# Patient Record
Sex: MALE | Race: WHITE | NOT HISPANIC OR LATINO | Employment: FULL TIME | URBAN - METROPOLITAN AREA
[De-identification: names, ages, dates, MRNs, and addresses within clinical notes are randomized per-mention and may not be internally consistent; named-entity substitution may affect disease eponyms.]

---

## 2017-07-14 ENCOUNTER — HOSPITAL ENCOUNTER (OUTPATIENT)
Dept: URGENT CARE | Facility: CLINIC | Age: 37
Setting detail: SPECIMEN
Discharge: HOME | End: 2017-07-14
Attending: FAMILY MEDICINE | Admitting: FAMILY MEDICINE

## 2017-07-15 LAB
BACTERIA SPEC AEROBE CULT: NORMAL
GRAM STN SPEC: NORMAL
Lab: NORMAL
MICRO REPORT STATUS: NORMAL
SPECIMEN SOURCE: NORMAL

## 2020-07-12 ENCOUNTER — HOSPITAL ENCOUNTER (EMERGENCY)
Facility: HOSPITAL | Age: 40
Discharge: HOME | End: 2020-07-12
Attending: EMERGENCY MEDICINE | Admitting: EMERGENCY MEDICINE

## 2020-07-12 VITALS
OXYGEN SATURATION: 100 % | HEART RATE: 82 BPM | HEIGHT: 70 IN | WEIGHT: 170 LBS | BODY MASS INDEX: 24.34 KG/M2 | DIASTOLIC BLOOD PRESSURE: 98 MMHG | TEMPERATURE: 97.4 F | RESPIRATION RATE: 16 BRPM | SYSTOLIC BLOOD PRESSURE: 139 MMHG

## 2020-07-12 DIAGNOSIS — F43.0 ACUTE STRESS REACTION: Primary | ICD-10-CM

## 2020-07-12 LAB
AMPHET+METHAMPHET UR QL: NEGATIVE
BARBITURATES UR QL SCN: NEGATIVE
BENZODIAZ UR QL SCN: NEGATIVE
CANNABINOIDS SERPL QL: POSITIVE
COCAINE UR QL: NEGATIVE
ETHANOL EXG-MCNC: 0 MG/DL
METHADONE UR QL SCN: NEGATIVE
OPIATES UR QL: NEGATIVE
OXYCODONE UR QL SCN: NEGATIVE
SARS-COV-2 RNA PNL SPEC NAA+PROBE: NOT DETECTED

## 2020-07-12 PROCEDURE — 80307 DRUG TEST PRSMV CHEM ANLYZR: CPT | Performed by: EMERGENCY MEDICINE

## 2020-07-12 PROCEDURE — 99284 EMERGENCY DEPT VISIT MOD MDM: CPT

## 2020-07-12 PROCEDURE — 87635 SARS-COV-2 COVID-19 AMP PRB: CPT | Performed by: EMERGENCY MEDICINE

## 2020-07-12 RX ORDER — NICOTINE 21 MG/24HR
1 PATCH, TRANSDERMAL 24 HOURS TRANSDERMAL
Status: DISCONTINUED | OUTPATIENT
Start: 2020-07-12 | End: 2020-07-12 | Stop reason: HOSPADM

## 2020-07-12 RX ADMIN — NICOTINE 1 PATCH: 21 PATCH TRANSDERMAL at 19:53

## 2020-07-12 NOTE — ED NOTES
I called [facility] to notify them that this patient will need an assessment for HI against his ex-wife.  While I had them on there, I asked what specific labwork they required for admission.  Per Katherine, the patient needs a blood alcohol level prior to assessment.  If he is deemed necessary for IP stay, he must have a negative Covid-19 prior to transfer.  Nurse and provider notified.      Gladys Eason, RegSched Rep  07/12/20 7645

## 2020-07-13 ENCOUNTER — PATIENT OUTREACH (OUTPATIENT)
Dept: CASE MANAGEMENT | Facility: OTHER | Age: 40
End: 2020-07-13

## 2020-07-13 ENCOUNTER — EPISODE CHANGES (OUTPATIENT)
Dept: CASE MANAGEMENT | Facility: OTHER | Age: 40
End: 2020-07-13

## 2020-07-13 NOTE — OUTREACH NOTE
Patient Outreach Note    Pt discharge from [facility] ED on 7/12/20, seen for acute stress reaction, covid 19 testing performed. Pt returned RN-ACM's call. Pt reports he did receive negative covid test results. Pt denies CP, SOA, fever, or cough. Pt denies SI, was cleared by psych in ED for outpatient therapy. Reviewed ED AVS with pt. Education provided. Pt reports he plans to call therapist for follow up, states the MD gave him a name and number to call. Discussed PCP follow up, pt requests RN-ACM schedule PCP appt for him. Pt denies food, medication, or transportation insecurities. [facility] 24 hour nurse line number provided, pt declines covid hotline number. No questions or concerns per pt. RN-ACM will call to schedule pt's PCP appt and call pt back.     Felton Griffith RN  Ambulatory     7/13/2020, 15:29

## 2020-07-13 NOTE — ED PROVIDER NOTES
Subjective   40-year-old male states that he was told to come to the emergency department.  He states he was upset over divorce arrangements with his ex-wife regarding child visitation.  He states that he said he wanted to kill her.  He states he was mad he did not really mean it.  The patient states that he does not have suicidal ideation.  He denies hallucination or delusion.  He states he does not drink alcohol and other than smoking some occasional marijuana has no substance abuse history.  He states that that he has no direct medical complaints          Review of Systems    No past medical history on file.    No Known Allergies    No past surgical history on file.    No family history on file.    Social History     Socioeconomic History   • Marital status: Single     Spouse name: Not on file   • Number of children: Not on file   • Years of education: Not on file   • Highest education level: Not on file           Objective   Physical Exam   Alert nontoxic Darion Coma Scale 15  HEENT showed no head to be normocephalic atraumatic neck was supple  Chest no chest wall tenderness  Abdomen nontender nondistended  Remedies spontaneous range of motion all extremities no track marks ambulatory without difficulty  Procedures           ED Course  ED Course as of Jul 12 2043   Sun Jul 12, 2020 2036 I examined the patient using the appropriate personal protective equipment.      Patient attempted to leave the emergency department for being cleared by psychiatry was escorted back to the room by security      [TH]      ED Course User Index  [TH] Fady Smith MD           Labs Reviewed   URINE DRUG SCREEN - Abnormal; Notable for the following components:       Result Value    THC, Screen, Urine Positive (*)     All other components within normal limits    Narrative:     Negative Thresholds For Drugs Screened:     Amphetamines               500 ng/ml   Barbiturates               200 ng/ml   Benzodiazepines            100  ng/ml   Cocaine                    300 ng/ml   Methadone                  300 ng/ml   Opiates                    300 ng/ml   Oxycodone                  100 ng/ml   THC                        50 ng/ml    The Normal Value for all drugs tested is negative. This report includes final unconfirmed screening results to be used for medical treatment purposes only. Unconfirmed results must not be used for non-medical purposes such as employment or legal testing. Clinical consideration should be applied to any drug of abuse test, particulary when unconfirmed results are used.  All urine drugs of abuse requests without chain of custody are for medical screening purposes only.  False positives are possible.     COVID-19 CARIAS BIO IN-HOUSE, NASAL SWAB NO TRANSPORT MEDIA - Normal    Narrative:     Fact sheet for providers: https://www.fda.gov/media/587076/download     Fact sheet for patients: https://www.fda.gov/media/286414/download   POCT ALCOHOL BREATH TEST - Normal     Medications   nicotine (NICODERM CQ) 21 MG/24HR patch 1 patch (1 patch Transdermal Medication Applied 7/12/20 1953)     No radiology results for the last day                                  MDM  Number of Diagnoses or Management Options     Amount and/or Complexity of Data Reviewed  Clinical lab tests: reviewed    Risk of Complications, Morbidity, and/or Mortality  Presenting problems: high  Diagnostic procedures: high  Management options: high  General comments: The patient remained calm in the emergency department after his attempted flight.  The patient be discharged for intensive outpatient therapy.  The police presented to evaluate the patient and were notified prior to his discharge.  They had reported to the nursing staff that the patient's ex-wife had been advised that he was at the facility for psychiatric evaluation        Final diagnoses:   Acute stress reaction            Fady Smith MD  07/12/20 2043

## 2020-07-13 NOTE — OUTREACH NOTE
Patient Outreach Note    Called pt, PCP appt information given. Pt thanks RN-ACDAYANA for the assistance. Advised pt to call with any needs. Follow up outreach as needed.     Felton Griffith RN  Ambulatory     7/13/2020, 15:47

## 2020-07-13 NOTE — OUTREACH NOTE
Care Coordination Note    Called [facility] PCP office, appt scheduled for Friday 7/24/20 at 1:00 pm. Staff states pt needs to bring insurance card, photo ID, wear a mask and come alone. RN-ACM will notify pt.     Felton Griffith RN  Ambulatory     7/13/2020, 15:32

## 2020-07-13 NOTE — DISCHARGE INSTRUCTIONS
Rest, no work, next 24 hours  Plenty of fluids  COVID-19 test was negative, drug screen positive only for marijuana  Call therapist for follow-up and any additional episodes

## 2020-07-24 ENCOUNTER — OFFICE VISIT (OUTPATIENT)
Dept: FAMILY MEDICINE CLINIC | Facility: CLINIC | Age: 40
End: 2020-07-24

## 2020-07-24 VITALS
DIASTOLIC BLOOD PRESSURE: 93 MMHG | BODY MASS INDEX: 24.25 KG/M2 | TEMPERATURE: 98 F | SYSTOLIC BLOOD PRESSURE: 152 MMHG | OXYGEN SATURATION: 100 % | WEIGHT: 169 LBS | HEART RATE: 75 BPM

## 2020-07-24 DIAGNOSIS — F43.29 STRESS AND ADJUSTMENT REACTION: Primary | ICD-10-CM

## 2020-07-24 DIAGNOSIS — F41.9 ANXIETY: ICD-10-CM

## 2020-07-24 PROCEDURE — 99203 OFFICE O/P NEW LOW 30 MIN: CPT | Performed by: NURSE PRACTITIONER

## 2020-07-24 RX ORDER — ESCITALOPRAM OXALATE 10 MG/1
10 TABLET ORAL DAILY
Qty: 30 TABLET | Refills: 3 | Status: SHIPPED | OUTPATIENT
Start: 2020-07-24 | End: 2020-08-11

## 2020-07-24 RX ORDER — HYDROXYZINE HYDROCHLORIDE 25 MG/1
25 TABLET, FILM COATED ORAL EVERY 6 HOURS PRN
Qty: 30 TABLET | Refills: 1 | Status: SHIPPED | OUTPATIENT
Start: 2020-07-24 | End: 2020-08-11 | Stop reason: SDUPTHER

## 2020-07-24 NOTE — PROGRESS NOTES
Subjective   Chivo Billy is a 40 y.o. male.       HPI   Pt. Is new to our office today.  Reports he hasn't seen PCP in many years.  Medical/social/family hx reviewed.    Pt. Seen in ER a few weeks ago for an acute stress reaction.  He reports he is getting a divorce and was supposed to be getting his 4 year old daughter for visitation.  He made arrangements to be off work for this week long visit and notified his ex-wife months before of the dates.  He reports his ex-wife told him two days before he was to get his daughter that those dates wouldn't work for her.  He became angry and had angry words with her; sent her a text indicating he would harm her.  She contacted the police; he was taken to the ER.  He did calm in the ER and admits he was angry and would never hurt her.  He was released and recommended to [facility] for outpatient counseling.  He did attend one session but states the counseling is set up in a group and he is very uncomfortable with that; would like individual counseling.  He was not started on any medications.  He does feel moods are depressed and he has an overall feeling of anxiety/irritability since the divorce.  He misses his daughter; hasn't seen her in over a month; tearful today in the office.  Denies any SI or HI.  He reports 3+ years ago he was given Wellbutrin for depression but it never felt it helped.    Denies any CP; palpitations; SOA; dizziness; headache; trouble with vision.  He reports a decrease in his appetite over the past two months and reports he is not sleeping well.        The following portions of the patient's history were reviewed and updated as appropriate: allergies, current medications, past family history, past medical history, past social history, past surgical history and problem list.    Review of Systems   Constitutional: Positive for appetite change and fatigue. Negative for activity change, chills, diaphoresis, fever, unexpected weight gain and unexpected  weight loss.   Eyes: Negative for blurred vision, double vision and visual disturbance.   Respiratory: Negative for cough, chest tightness, shortness of breath and wheezing.    Cardiovascular: Negative for chest pain, palpitations and leg swelling.   Gastrointestinal: Negative for abdominal distention, abdominal pain, blood in stool, constipation, diarrhea, nausea, vomiting and indigestion.   Genitourinary: Negative for dysuria, flank pain, frequency, hematuria, nocturia and urgency.   Musculoskeletal: Negative for arthralgias, back pain and myalgias.   Skin: Negative for rash.   Neurological: Negative for dizziness, syncope, weakness, light-headedness, numbness, headache and confusion.   Hematological: Negative for adenopathy.   Psychiatric/Behavioral: Positive for sleep disturbance, depressed mood and stress. Negative for self-injury and suicidal ideas. The patient is nervous/anxious.        Objective   Physical Exam   Constitutional: He is oriented to person, place, and time. He appears well-developed and well-nourished. No distress.   HENT:   Head: Normocephalic and atraumatic.   Right Ear: External ear normal.   Left Ear: External ear normal.   Nose: Nose normal.   Mouth/Throat: Oropharynx is clear and moist.   Eyes: Pupils are equal, round, and reactive to light. Conjunctivae are normal.   Neck: Normal range of motion. Neck supple. No JVD present. No thyromegaly present.   Cardiovascular: Normal rate, regular rhythm, normal heart sounds and intact distal pulses.   No murmur heard.  Pulmonary/Chest: Effort normal and breath sounds normal. No respiratory distress. He has no wheezes. He exhibits no tenderness.   Abdominal: Soft. Bowel sounds are normal. He exhibits no distension and no mass. There is no tenderness. There is no rebound and no guarding.   Musculoskeletal: He exhibits no edema.   Lymphadenopathy:     He has no cervical adenopathy.   Neurological: He is alert and oriented to person, place, and time.    Skin: Skin is warm and dry. Capillary refill takes less than 2 seconds. No rash noted. No erythema.   Psychiatric: His speech is normal. Judgment and thought content normal. His mood appears anxious. His affect is not angry and not inappropriate. He is not agitated and not aggressive. Cognition and memory are normal. He exhibits a depressed mood. He expresses no homicidal and no suicidal ideation. He expresses no suicidal plans and no homicidal plans.   Vitals reviewed.        Assessment/Plan   Chivo was seen today for stress.    Diagnoses and all orders for this visit:    Stress and adjustment reaction  Comments:  Given names and numbers for individual counseling services.    Also given names and numbers for emergency services at [facility] and [facility].     Orders:  -     escitalopram (Lexapro) 10 MG tablet; Take 1 tablet by mouth Daily.  -     hydrOXYzine (ATARAX) 25 MG tablet; Take 1 tablet by mouth Every 6 (Six) Hours As Needed for Anxiety.    Anxiety  Comments:  Started escitalopram 10 mg daily.   Given PRN hydroxyzine for anxiety attack.   Counseling recommended; names/numbers given.   Follow up 4 weeks   Orders:  -     escitalopram (Lexapro) 10 MG tablet; Take 1 tablet by mouth Daily.  -     hydrOXYzine (ATARAX) 25 MG tablet; Take 1 tablet by mouth Every 6 (Six) Hours As Needed for Anxiety.

## 2020-08-10 ENCOUNTER — TELEPHONE (OUTPATIENT)
Dept: FAMILY MEDICINE CLINIC | Facility: CLINIC | Age: 40
End: 2020-08-10

## 2020-08-10 NOTE — TELEPHONE ENCOUNTER
Patient states his father recently passed away and he is wanting to know if there is anything he can do to get more time off work

## 2020-08-11 ENCOUNTER — OFFICE VISIT (OUTPATIENT)
Dept: FAMILY MEDICINE CLINIC | Facility: CLINIC | Age: 40
End: 2020-08-11

## 2020-08-11 VITALS
TEMPERATURE: 97.3 F | HEART RATE: 87 BPM | DIASTOLIC BLOOD PRESSURE: 91 MMHG | HEIGHT: 70 IN | SYSTOLIC BLOOD PRESSURE: 144 MMHG | WEIGHT: 173 LBS | OXYGEN SATURATION: 98 % | BODY MASS INDEX: 24.77 KG/M2

## 2020-08-11 DIAGNOSIS — F41.9 ANXIETY: Primary | ICD-10-CM

## 2020-08-11 DIAGNOSIS — F43.21 GRIEF REACTION: ICD-10-CM

## 2020-08-11 DIAGNOSIS — R06.83 SNORING: ICD-10-CM

## 2020-08-11 DIAGNOSIS — R53.83 FATIGUE, UNSPECIFIED TYPE: ICD-10-CM

## 2020-08-11 PROCEDURE — 99214 OFFICE O/P EST MOD 30 MIN: CPT | Performed by: NURSE PRACTITIONER

## 2020-08-11 RX ORDER — HYDROXYZINE HYDROCHLORIDE 25 MG/1
25 TABLET, FILM COATED ORAL EVERY 6 HOURS PRN
Qty: 30 TABLET | Refills: 1 | Status: SHIPPED | OUTPATIENT
Start: 2020-08-11 | End: 2020-09-11 | Stop reason: SDUPTHER

## 2020-08-11 NOTE — PROGRESS NOTES
"Subjective   Chivo Billy is a 40 y.o. male.       HPI   Pt. Is here today to follow up on anxiety.  He was started on escitalopram 10 mg at the last visit about 3 weeks ago.  He has been taking it daily and has noticed he feels more \"shaky\" since starting this med.  Anxiety has only improved slightly.  On Friday, his father passed away unexpectedly of a massive MI. He is dealing with the grief related to this and trying to plan a ; he still is not on good terms with his ex-wife and still hasn't been able to see his daughter; it has been about 2 months.  He hasn't started counseling but has the names and plans to do this soon.  He is having a difficult time with his father's death and is requesting some time off work to get things sorted out.  His employer only allows him 3 days for bereavement. He denies any SI or HI.      With his father passing of an MI, he learned that his father also had sleep apnea.  Pt. Is now concerned he may also have this.  He has been told by his children that he snores loudly during sleep.  He says he has never felt like he gets good sleep; he wakes frequently.  He also reports feeling fatigued upon waking in the morning.  He denies any CP; palpitations; SOA; dizziness; headache; trouble with vision.        The following portions of the patient's history were reviewed and updated as appropriate: allergies, current medications, past family history, past medical history, past social history, past surgical history and problem list.    Review of Systems   Constitutional: Positive for fatigue. Negative for activity change, appetite change, chills, diaphoresis, fever, unexpected weight gain and unexpected weight loss.   HENT: Negative for sore throat, swollen glands, trouble swallowing and voice change.    Eyes: Negative for blurred vision, double vision and visual disturbance.   Respiratory: Negative for cough, chest tightness, shortness of breath and wheezing.    Cardiovascular: " Negative for chest pain, palpitations and leg swelling.   Gastrointestinal: Negative for abdominal distention, abdominal pain, blood in stool, constipation, diarrhea, nausea, vomiting and indigestion.   Genitourinary: Negative for dysuria, flank pain, frequency, hematuria, nocturia and urgency.   Musculoskeletal: Negative for arthralgias, back pain and myalgias.   Skin: Negative for rash.   Neurological: Negative for dizziness, weakness, light-headedness, headache and confusion.   Psychiatric/Behavioral: Positive for sleep disturbance (snoring), depressed mood and stress. Negative for agitation, self-injury and suicidal ideas. The patient is nervous/anxious.        Objective   Physical Exam   Constitutional: He is oriented to person, place, and time. He appears well-developed and well-nourished. No distress.   HENT:   Head: Normocephalic and atraumatic.   Right Ear: External ear normal.   Left Ear: External ear normal.   Nose: Nose normal.   Mouth/Throat: Oropharynx is clear and moist.   Eyes: Pupils are equal, round, and reactive to light. Conjunctivae are normal.   Neck: Normal range of motion. Neck supple. No JVD present. No thyromegaly present.   Cardiovascular: Normal rate, regular rhythm, normal heart sounds and intact distal pulses.   No murmur heard.  Pulmonary/Chest: Effort normal and breath sounds normal. No respiratory distress. He has no wheezes. He exhibits no tenderness.   Abdominal: Soft. Bowel sounds are normal. He exhibits no distension. There is no tenderness.   Musculoskeletal: He exhibits no edema.   Lymphadenopathy:     He has no cervical adenopathy.   Neurological: He is alert and oriented to person, place, and time.   Skin: Skin is warm and dry. Capillary refill takes less than 2 seconds. No erythema.   Psychiatric: His speech is normal and behavior is normal. Judgment normal. His mood appears anxious. Cognition and memory are normal. He exhibits a depressed mood. He expresses no homicidal and  no suicidal ideation. He expresses no suicidal plans and no homicidal plans.   Tearful today   Vitals reviewed.        Assessment/Plan   Chivo was seen today for anxiety.    Diagnoses and all orders for this visit:    Anxiety  Comments:  Will switch from escitalopram to sertraline 50 mg daily.    Refill given for hydroxyzine PRN.   Counseling recommended.   Follow up 4 weeks.     Orders:  -     sertraline (Zoloft) 50 MG tablet; Take 1 tablet by mouth Daily.  -     hydrOXYzine (ATARAX) 25 MG tablet; Take 1 tablet by mouth Every 6 (Six) Hours As Needed for Anxiety.    Grief reaction  Comments:  Given names and numbers for individual counseling services.    Also given names and numbers for emergency services at [facility] and [facility].     Orders:  -     sertraline (Zoloft) 50 MG tablet; Take 1 tablet by mouth Daily.  -     hydrOXYzine (ATARAX) 25 MG tablet; Take 1 tablet by mouth Every 6 (Six) Hours As Needed for Anxiety.    Snoring  Comments:  Referral to Sleep Medicine for eval.   Orders:  -     Ambulatory Referral to Sleep Medicine    Fatigue, unspecified type  Comments:  Referral to Sleep Medicine for eval.   Orders:  -     Ambulatory Referral to Sleep Medicine

## 2020-08-21 ENCOUNTER — TELEPHONE (OUTPATIENT)
Dept: FAMILY MEDICINE CLINIC | Facility: CLINIC | Age: 40
End: 2020-08-21

## 2020-08-21 NOTE — TELEPHONE ENCOUNTER
Patient wants to know if you would be able to extend his FMLA? He says he is not ready to go back Monday.

## 2020-09-11 ENCOUNTER — OFFICE VISIT (OUTPATIENT)
Dept: FAMILY MEDICINE CLINIC | Facility: CLINIC | Age: 40
End: 2020-09-11

## 2020-09-11 ENCOUNTER — LAB (OUTPATIENT)
Dept: FAMILY MEDICINE CLINIC | Facility: CLINIC | Age: 40
End: 2020-09-11

## 2020-09-11 VITALS
HEIGHT: 70 IN | SYSTOLIC BLOOD PRESSURE: 139 MMHG | WEIGHT: 172 LBS | TEMPERATURE: 97.1 F | HEART RATE: 77 BPM | BODY MASS INDEX: 24.62 KG/M2 | OXYGEN SATURATION: 100 % | DIASTOLIC BLOOD PRESSURE: 94 MMHG

## 2020-09-11 DIAGNOSIS — I10 ESSENTIAL HYPERTENSION: ICD-10-CM

## 2020-09-11 DIAGNOSIS — F41.9 ANXIETY: Primary | ICD-10-CM

## 2020-09-11 LAB
ALBUMIN SERPL-MCNC: 3.8 G/DL (ref 3.5–5.2)
ALBUMIN/GLOB SERPL: 1.5 G/DL
ALP SERPL-CCNC: 67 U/L (ref 39–117)
ALT SERPL W P-5'-P-CCNC: 13 U/L (ref 1–41)
ANION GAP SERPL CALCULATED.3IONS-SCNC: 7 MMOL/L (ref 5–15)
AST SERPL-CCNC: 11 U/L (ref 1–40)
BILIRUB SERPL-MCNC: 0.5 MG/DL (ref 0–1.2)
BUN SERPL-MCNC: 11 MG/DL (ref 6–20)
BUN/CREAT SERPL: 12.8 (ref 7–25)
CALCIUM SPEC-SCNC: 9.2 MG/DL (ref 8.6–10.5)
CHLORIDE SERPL-SCNC: 109 MMOL/L (ref 98–107)
CHOLEST SERPL-MCNC: 182 MG/DL (ref 0–200)
CO2 SERPL-SCNC: 24 MMOL/L (ref 22–29)
CREAT SERPL-MCNC: 0.86 MG/DL (ref 0.76–1.27)
GFR SERPL CREATININE-BSD FRML MDRD: 100 ML/MIN/1.73
GLOBULIN UR ELPH-MCNC: 2.5 GM/DL
GLUCOSE SERPL-MCNC: 83 MG/DL (ref 65–99)
HDLC SERPL-MCNC: 36 MG/DL (ref 40–60)
LDLC SERPL CALC-MCNC: 128 MG/DL (ref 0–100)
LDLC/HDLC SERPL: 3.55 {RATIO}
POTASSIUM SERPL-SCNC: 4.1 MMOL/L (ref 3.5–5.2)
PROT SERPL-MCNC: 6.3 G/DL (ref 6–8.5)
SODIUM SERPL-SCNC: 140 MMOL/L (ref 136–145)
TRIGL SERPL-MCNC: 91 MG/DL (ref 0–150)
VLDLC SERPL-MCNC: 18.2 MG/DL (ref 5–40)

## 2020-09-11 PROCEDURE — 36415 COLL VENOUS BLD VENIPUNCTURE: CPT

## 2020-09-11 PROCEDURE — 80061 LIPID PANEL: CPT | Performed by: NURSE PRACTITIONER

## 2020-09-11 PROCEDURE — 80053 COMPREHEN METABOLIC PANEL: CPT | Performed by: NURSE PRACTITIONER

## 2020-09-11 PROCEDURE — 99214 OFFICE O/P EST MOD 30 MIN: CPT | Performed by: NURSE PRACTITIONER

## 2020-09-11 RX ORDER — HYDROXYZINE HYDROCHLORIDE 25 MG/1
25 TABLET, FILM COATED ORAL EVERY 6 HOURS PRN
Qty: 30 TABLET | Refills: 1 | Status: SHIPPED | OUTPATIENT
Start: 2020-09-11 | End: 2021-02-05 | Stop reason: SDUPTHER

## 2020-09-11 RX ORDER — LISINOPRIL 10 MG/1
10 TABLET ORAL DAILY
Qty: 30 TABLET | Refills: 2 | Status: SHIPPED | OUTPATIENT
Start: 2020-09-11 | End: 2021-04-16

## 2020-09-11 NOTE — PATIENT INSTRUCTIONS
Managing Anxiety, Adult  After being diagnosed with an anxiety disorder, you may be relieved to know why you have felt or behaved a certain way. You may also feel overwhelmed about the treatment ahead and what it will mean for your life. With care and support, you can manage this condition and recover from it.  How to manage lifestyle changes  Managing stress and anxiety    Stress is your body's reaction to life changes and events, both good and bad. Most stress will last just a few hours, but stress can be ongoing and can lead to more than just stress. Although stress can play a major role in anxiety, it is not the same as anxiety. Stress is usually caused by something external, such as a deadline, test, or competition. Stress normally passes after the triggering event has ended.   Anxiety is caused by something internal, such as imagining a terrible outcome or worrying that something will go wrong that will devastate you. Anxiety often does not go away even after the triggering event is over, and it can become long-term (chronic) worry. It is important to understand the differences between stress and anxiety and to manage your stress effectively so that it does not lead to an anxious response.  Talk with your health care provider or a counselor to learn more about reducing anxiety and stress. He or she may suggest tension reduction techniques, such as:  · Music therapy. This can include creating or listening to music that you enjoy and that inspires you.  · Mindfulness-based meditation. This involves being aware of your normal breaths while not trying to control your breathing. It can be done while sitting or walking.  · Centering prayer. This involves focusing on a word, phrase, or sacred image that means something to you and brings you peace.  · Deep breathing. To do this, expand your stomach and inhale slowly through your nose. Hold your breath for 3-5 seconds. Then exhale slowly, letting your stomach muscles  relax.  · Self-talk. This involves identifying thought patterns that lead to anxiety reactions and changing those patterns.  · Muscle relaxation. This involves tensing muscles and then relaxing them.  Choose a tension reduction technique that suits your lifestyle and personality. These techniques take time and practice. Set aside 5-15 minutes a day to do them. Therapists can offer counseling and training in these techniques. The training to help with anxiety may be covered by some insurance plans. Other things you can do to manage stress and anxiety include:  · Keeping a stress/anxiety diary. This can help you learn what triggers your reaction and then learn ways to manage your response.  · Thinking about how you react to certain situations. You may not be able to control everything, but you can control your response.  · Making time for activities that help you relax and not feeling guilty about spending your time in this way.  · Visual imagery and yoga can help you stay calm and relax.    Medicines  Medicines can help ease symptoms. Medicines for anxiety include:  · Anti-anxiety drugs.  · Antidepressants.  Medicines are often used as a primary treatment for anxiety disorder. Medicines will be prescribed by a health care provider. When used together, medicines, psychotherapy, and tension reduction techniques may be the most effective treatment.  Relationships  Relationships can play a big part in helping you recover. Try to spend more time connecting with trusted friends and family members. Consider going to couples counseling, taking family education classes, or going to family therapy. Therapy can help you and others better understand your condition.  How to recognize changes in your anxiety  Everyone responds differently to treatment for anxiety. Recovery from anxiety happens when symptoms decrease and stop interfering with your daily activities at home or work. This may mean that you will start to:  · Have  better concentration and focus. Worry will interfere less in your daily thinking.  · Sleep better.  · Be less irritable.  · Have more energy.  · Have improved memory.  It is important to recognize when your condition is getting worse. Contact your health care provider if your symptoms interfere with home or work and you feel like your condition is not improving.  Follow these instructions at home:  Activity  · Exercise. Most adults should do the following:  ? Exercise for at least 150 minutes each week. The exercise should increase your heart rate and make you sweat (moderate-intensity exercise).  ? Strengthening exercises at least twice a week.  · Get the right amount and quality of sleep. Most adults need 7-9 hours of sleep each night.  Lifestyle    · Eat a healthy diet that includes plenty of vegetables, fruits, whole grains, low-fat dairy products, and lean protein. Do not eat a lot of foods that are high in solid fats, added sugars, or salt.  · Make choices that simplify your life.  · Do not use any products that contain nicotine or tobacco, such as cigarettes, e-cigarettes, and chewing tobacco. If you need help quitting, ask your health care provider.  · Avoid caffeine, alcohol, and certain over-the-counter cold medicines. These may make you feel worse. Ask your pharmacist which medicines to avoid.  General instructions  · Take over-the-counter and prescription medicines only as told by your health care provider.  · Keep all follow-up visits as told by your health care provider. This is important.  Where to find support  You can get help and support from these sources:  · Self-help groups.  · Online and community organizations.  · A trusted spiritual leader.  · Couples counseling.  · Family education classes.  · Family therapy.  Where to find more information  You may find that joining a support group helps you deal with your anxiety. The following sources can help you locate counselors or support groups near  "you:  · Mental Health [place]: www.mentalhealth[place].net  · Anxiety and Depression Association of [place] (ADAA): www.adaa.org  · National [place] on Mental Illness ([place]): www.[place]om[place].org  Contact a health care provider if you:  · Have a hard time staying focused or finishing daily tasks.  · Spend many hours a day feeling worried about everyday life.  · Become exhausted by worry.  · Start to have headaches, feel tense, or have nausea.  · Urinate more than normal.  · Have diarrhea.  Get help right away if you have:  · A racing heart and shortness of breath.  · Thoughts of hurting yourself or others.  If you ever feel like you may hurt yourself or others, or have thoughts about taking your own life, get help right away. You can go to your nearest emergency department or call:  · Your local emergency services (911 in the [place]  · A suicide crisis helpline, such as the National Suicide Prevention Lifeline at 1-828.916.1027. This is open 24 hours a day.  Summary  · Taking steps to learn and use tension reduction techniques can help calm you and help prevent triggering an anxiety reaction.  · When used together, medicines, psychotherapy, and tension reduction techniques may be the most effective treatment.  · Family, friends, and partners can play a big part in helping you recover from an anxiety disorder.  This information is not intended to replace advice given to you by your health care provider. Make sure you discuss any questions you have with your health care provider.  Document Released: 12/12/2017 Document Revised: 05/19/2020 Document Reviewed: 05/19/2020  Elsevier Patient Education © 2020 [employer]  DASH Eating Plan  DASH stands for \"Dietary Approaches to Stop Hypertension.\" The DASH eating plan is a healthy eating plan that has been shown to reduce high blood pressure (hypertension). It may also reduce your risk for type 2 diabetes, heart disease, and stroke. The DASH eating plan may also help with weight " "loss.  What are tips for following this plan?    General guidelines  · Avoid eating more than 2,300 mg (milligrams) of salt (sodium) a day. If you have hypertension, you may need to reduce your sodium intake to 1,500 mg a day.  · Limit alcohol intake to no more than 1 drink a day for nonpregnant women and 2 drinks a day for men. One drink equals 12 oz of beer, 5 oz of wine, or 1½ oz of hard liquor.  · Work with your health care provider to maintain a healthy body weight or to lose weight. Ask what an ideal weight is for you.  · Get at least 30 minutes of exercise that causes your heart to beat faster (aerobic exercise) most days of the week. Activities may include walking, swimming, or biking.  · Work with your health care provider or diet and nutrition specialist (dietitian) to adjust your eating plan to your individual calorie needs.  Reading food labels    · Check food labels for the amount of sodium per serving. Choose foods with less than 5 percent of the Daily Value of sodium. Generally, foods with less than 300 mg of sodium per serving fit into this eating plan.  · To find whole grains, look for the word \"whole\" as the first word in the ingredient list.  Shopping  · Buy products labeled as \"low-sodium\" or \"no salt added.\"  · Buy fresh foods. Avoid canned foods and premade or frozen meals.  Cooking  · Avoid adding salt when cooking. Use salt-free seasonings or herbs instead of table salt or sea salt. Check with your health care provider or pharmacist before using salt substitutes.  · Do not pyle foods. Cook foods using healthy methods such as baking, boiling, grilling, and broiling instead.  · Cook with heart-healthy oils, such as olive, canola, soybean, or sunflower oil.  Meal planning  · Eat a balanced diet that includes:  ? 5 or more servings of fruits and vegetables each day. At each meal, try to fill half of your plate with fruits and vegetables.  ? Up to 6-8 servings of whole grains each day.  ? Less than " 6 oz of lean meat, poultry, or fish each day. A 3-oz serving of meat is about the same size as a deck of cards. One egg equals 1 oz.  ? 2 servings of low-fat dairy each day.  ? A serving of nuts, seeds, or beans 5 times each week.  ? Heart-healthy fats. Healthy fats called Omega-3 fatty acids are found in foods such as flaxseeds and coldwater fish, like sardines, salmon, and mackerel.  · Limit how much you eat of the following:  ? Canned or prepackaged foods.  ? Food that is high in trans fat, such as fried foods.  ? Food that is high in saturated fat, such as fatty meat.  ? Sweets, desserts, sugary drinks, and other foods with added sugar.  ? Full-fat dairy products.  · Do not salt foods before eating.  · Try to eat at least 2 vegetarian meals each week.  · Eat more home-cooked food and less restaurant, buffet, and fast food.  · When eating at a restaurant, ask that your food be prepared with less salt or no salt, if possible.  What foods are recommended?  The items listed may not be a complete list. Talk with your dietitian about what dietary choices are best for you.  Grains  Whole-grain or whole-wheat bread. Whole-grain or whole-wheat pasta. Brown rice. Oatmeal. Quinoa. Bulgur. Whole-grain and low-sodium cereals. Seda bread. Low-fat, low-sodium crackers. Whole-wheat flour tortillas.  Vegetables  Fresh or frozen vegetables (raw, steamed, roasted, or grilled). Low-sodium or reduced-sodium tomato and vegetable juice. Low-sodium or reduced-sodium tomato sauce and tomato paste. Low-sodium or reduced-sodium canned vegetables.  Fruits  All fresh, dried, or frozen fruit. Canned fruit in natural juice (without added sugar).  Meat and other protein foods  Skinless chicken or turkey. Ground chicken or turkey. Pork with fat trimmed off. Fish and seafood. Egg whites. Dried beans, peas, or lentils. Unsalted nuts, nut butters, and seeds. Unsalted canned beans. Lean cuts of beef with fat trimmed off. Low-sodium, lean deli  meat.  Dairy  Low-fat (1%) or fat-free (skim) milk. Fat-free, low-fat, or reduced-fat cheeses. Nonfat, low-sodium ricotta or cottage cheese. Low-fat or nonfat yogurt. Low-fat, low-sodium cheese.  Fats and oils  Soft margarine without trans fats. Vegetable oil. Low-fat, reduced-fat, or light mayonnaise and salad dressings (reduced-sodium). Canola, safflower, olive, soybean, and sunflower oils. Avocado.  Seasoning and other foods  Herbs. Spices. Seasoning mixes without salt. Unsalted popcorn and pretzels. Fat-free sweets.  What foods are not recommended?  The items listed may not be a complete list. Talk with your dietitian about what dietary choices are best for you.  Grains  Baked goods made with fat, such as croissants, muffins, or some breads. Dry pasta or rice meal packs.  Vegetables  Creamed or fried vegetables. Vegetables in a cheese sauce. Regular canned vegetables (not low-sodium or reduced-sodium). Regular canned tomato sauce and paste (not low-sodium or reduced-sodium). Regular tomato and vegetable juice (not low-sodium or reduced-sodium). Pickles. Olives.  Fruits  Canned fruit in a light or heavy syrup. Fried fruit. Fruit in cream or butter sauce.  Meat and other protein foods  Fatty cuts of meat. Ribs. Fried meat. Wang. Sausage. Bologna and other processed lunch meats. Salami. Fatback. Hotdogs. Bratwurst. Salted nuts and seeds. Canned beans with added salt. Canned or smoked fish. Whole eggs or egg yolks. Chicken or turkey with skin.  Dairy  Whole or 2% milk, cream, and half-and-half. Whole or full-fat cream cheese. Whole-fat or sweetened yogurt. Full-fat cheese. Nondairy creamers. Whipped toppings. Processed cheese and cheese spreads.  Fats and oils  Butter. Stick margarine. Lard. Shortening. Ghee. Wang fat. Tropical oils, such as coconut, palm kernel, or palm oil.  Seasoning and other foods  Salted popcorn and pretzels. Onion salt, garlic salt, seasoned salt, table salt, and sea salt. [facility]  sauce. Tartar sauce. Barbecue sauce. Teriyaki sauce. Soy sauce, including reduced-sodium. Steak sauce. Canned and packaged gravies. Fish sauce. Oyster sauce. Cocktail sauce. Horseradish that you find on the shelf. Ketchup. Mustard. Meat flavorings and tenderizers. Bouillon cubes. Hot sauce and Tabasco sauce. Premade or packaged marinades. Premade or packaged taco seasonings. Relishes. Regular salad dressings.  Where to find more information:  · National Heart, Lung, and Blood [place]: www.nhlbi.nih.gov  · American Heart Association: www.heart.org  Summary  · The DASH eating plan is a healthy eating plan that has been shown to reduce high blood pressure (hypertension). It may also reduce your risk for type 2 diabetes, heart disease, and stroke.  · With the DASH eating plan, you should limit salt (sodium) intake to 2,300 mg a day. If you have hypertension, you may need to reduce your sodium intake to 1,500 mg a day.  · When on the DASH eating plan, aim to eat more fresh fruits and vegetables, whole grains, lean proteins, low-fat dairy, and heart-healthy fats.  · Work with your health care provider or diet and nutrition specialist (dietitian) to adjust your eating plan to your individual calorie needs.  This information is not intended to replace advice given to you by your health care provider. Make sure you discuss any questions you have with your health care provider.  Document Released: 12/06/2012 Document Revised: 11/30/2018 Document Reviewed: 12/11/2017  [employer] Video Patient Education © 2020 [employer]  Hypertension, Adult  High blood pressure (hypertension) is when the force of blood pumping through the arteries is too strong. The arteries are the blood vessels that carry blood from the heart throughout the body. Hypertension forces the heart to work harder to pump blood and may cause arteries to become narrow or stiff. Untreated or uncontrolled hypertension can cause a heart attack, heart failure, a stroke,  "kidney disease, and other problems.  A blood pressure reading consists of a higher number over a lower number. Ideally, your blood pressure should be below 120/80. The first (\"top\") number is called the systolic pressure. It is a measure of the pressure in your arteries as your heart beats. The second (\"bottom\") number is called the diastolic pressure. It is a measure of the pressure in your arteries as the heart relaxes.  What are the causes?  The exact cause of this condition is not known. There are some conditions that result in or are related to high blood pressure.  What increases the risk?  Some risk factors for high blood pressure are under your control. The following factors may make you more likely to develop this condition:  · Smoking.  · Having type 2 diabetes mellitus, high cholesterol, or both.  · Not getting enough exercise or physical activity.  · Being overweight.  · Having too much fat, sugar, calories, or salt (sodium) in your diet.  · Drinking too much alcohol.  Some risk factors for high blood pressure may be difficult or impossible to change. Some of these factors include:  · Having chronic kidney disease.  · Having a family history of high blood pressure.  · Age. Risk increases with age.  · Race. You may be at higher risk if you are .  · Gender. Men are at higher risk than women before age 45. After age 65, women are at higher risk than men.  · Having obstructive sleep apnea.  · Stress.  What are the signs or symptoms?  High blood pressure may not cause symptoms. Very high blood pressure (hypertensive crisis) may cause:  · Headache.  · Anxiety.  · Shortness of breath.  · Nosebleed.  · Nausea and vomiting.  · Vision changes.  · Severe chest pain.  · Seizures.  How is this diagnosed?  This condition is diagnosed by measuring your blood pressure while you are seated, with your arm resting on a flat surface, your legs uncrossed, and your feet flat on the floor. The cuff of the blood " pressure monitor will be placed directly against the skin of your upper arm at the level of your heart. It should be measured at least twice using the same arm. Certain conditions can cause a difference in blood pressure between your right and left arms.  Certain factors can cause blood pressure readings to be lower or higher than normal for a short period of time:  · When your blood pressure is higher when you are in a health care provider's office than when you are at home, this is called white coat hypertension. Most people with this condition do not need medicines.  · When your blood pressure is higher at home than when you are in a health care provider's office, this is called masked hypertension. Most people with this condition may need medicines to control blood pressure.  If you have a high blood pressure reading during one visit or you have normal blood pressure with other risk factors, you may be asked to:  · Return on a different day to have your blood pressure checked again.  · Monitor your blood pressure at home for 1 week or longer.  If you are diagnosed with hypertension, you may have other blood or imaging tests to help your health care provider understand your overall risk for other conditions.  How is this treated?  This condition is treated by making healthy lifestyle changes, such as eating healthy foods, exercising more, and reducing your alcohol intake. Your health care provider may prescribe medicine if lifestyle changes are not enough to get your blood pressure under control, and if:  · Your systolic blood pressure is above 130.  · Your diastolic blood pressure is above 80.  Your personal target blood pressure may vary depending on your medical conditions, your age, and other factors.  Follow these instructions at home:  Eating and drinking    · Eat a diet that is high in fiber and potassium, and low in sodium, added sugar, and fat. An example eating plan is called the DASH (Dietary  Approaches to Stop Hypertension) diet. To eat this way:  ? Eat plenty of fresh fruits and vegetables. Try to fill one half of your plate at each meal with fruits and vegetables.  ? Eat whole grains, such as whole-wheat pasta, brown rice, or whole-grain bread. Fill about one fourth of your plate with whole grains.  ? Eat or drink low-fat dairy products, such as skim milk or low-fat yogurt.  ? Avoid fatty cuts of meat, processed or cured meats, and poultry with skin. Fill about one fourth of your plate with lean proteins, such as fish, chicken without skin, beans, eggs, or tofu.  ? Avoid pre-made and processed foods. These tend to be higher in sodium, added sugar, and fat.  · Reduce your daily sodium intake. Most people with hypertension should eat less than 1,500 mg of sodium a day.  · Do not drink alcohol if:  ? Your health care provider tells you not to drink.  ? You are pregnant, may be pregnant, or are planning to become pregnant.  · If you drink alcohol:  ? Limit how much you use to:  § 0-1 drink a day for women.  § 0-2 drinks a day for men.  ? Be aware of how much alcohol is in your drink. In the [place] one drink equals one 12 oz bottle of beer (355 mL), one 5 oz glass of wine (148 mL), or one 1½ oz glass of hard liquor (44 mL).  Lifestyle    · Work with your health care provider to maintain a healthy body weight or to lose weight. Ask what an ideal weight is for you.  · Get at least 30 minutes of exercise most days of the week. Activities may include walking, swimming, or biking.  · Include exercise to strengthen your muscles (resistance exercise), such as Pilates or lifting weights, as part of your weekly exercise routine. Try to do these types of exercises for 30 minutes at least 3 days a week.  · Do not use any products that contain nicotine or tobacco, such as cigarettes, e-cigarettes, and chewing tobacco. If you need help quitting, ask your health care provider.  · Monitor your blood pressure at home as  told by your health care provider.  · Keep all follow-up visits as told by your health care provider. This is important.  Medicines  · Take over-the-counter and prescription medicines only as told by your health care provider. Follow directions carefully. Blood pressure medicines must be taken as prescribed.  · Do not skip doses of blood pressure medicine. Doing this puts you at risk for problems and can make the medicine less effective.  · Ask your health care provider about side effects or reactions to medicines that you should watch for.  Contact a health care provider if you:  · Think you are having a reaction to a medicine you are taking.  · Have headaches that keep coming back (recurring).  · Feel dizzy.  · Have swelling in your ankles.  · Have trouble with your vision.  Get help right away if you:  · Develop a severe headache or confusion.  · Have unusual weakness or numbness.  · Feel faint.  · Have severe pain in your chest or abdomen.  · Vomit repeatedly.  · Have trouble breathing.  Summary  · Hypertension is when the force of blood pumping through your arteries is too strong. If this condition is not controlled, it may put you at risk for serious complications.  · Your personal target blood pressure may vary depending on your medical conditions, your age, and other factors. For most people, a normal blood pressure is less than 120/80.  · Hypertension is treated with lifestyle changes, medicines, or a combination of both. Lifestyle changes include losing weight, eating a healthy, low-sodium diet, exercising more, and limiting alcohol.  This information is not intended to replace advice given to you by your health care provider. Make sure you discuss any questions you have with your health care provider.  Document Released: 12/18/2006 Document Revised: 08/28/2019 Document Reviewed: 08/28/2019  Elsevier Patient Education © 2020 [employer]

## 2020-09-11 NOTE — PROGRESS NOTES
"Subjective   Chivo Billy is a 40 y.o. male.       HPI   Here to follow up on anxiety.  Currently on sertraline 50 mg daily.  He says this medication is making him feel more anxious.  He did not tolerate escitalopram the month prior due to the same concern. He is taking the hydroxyzine twice daily for anxiety.  He feels \"shaky\" and \"very anxious in social settings\".  Moods are up and down due to custody dispute for his daughter.  He still has not been able to see his daughter for several months now.    Smokes 3 ppd cigarettes and THC.  In prison on Wednesday for a warrant; he turned himself in for.  At his father's recent  he learned his grandmother had bipolar disorder and his aunt also has anxiety.     BP remains elevated. He denies any CP; palpitations; SOA: dizziness; headache; trouble with vision.  He is also drinking a pot of coffee and monster energy drinks daily.      The following portions of the patient's history were reviewed and updated as appropriate: allergies, current medications, past family history, past medical history, past social history, past surgical history and problem list.    Review of Systems   Constitutional: Negative for activity change, appetite change, chills, diaphoresis, fatigue, fever, unexpected weight gain and unexpected weight loss.   Eyes: Negative for photophobia and visual disturbance.   Respiratory: Negative for cough, chest tightness, shortness of breath and wheezing.    Cardiovascular: Negative for chest pain, palpitations and leg swelling.   Gastrointestinal: Negative for abdominal distention, abdominal pain, blood in stool, constipation, diarrhea, nausea, vomiting and indigestion.   Genitourinary: Negative for dysuria, flank pain, frequency, hematuria, nocturia and urgency.   Musculoskeletal: Negative for arthralgias, back pain and myalgias.   Skin: Negative for rash.   Neurological: Negative for dizziness, weakness, light-headedness, numbness, headache and " confusion.   Psychiatric/Behavioral: Positive for agitation, sleep disturbance, depressed mood and stress. Negative for hallucinations, self-injury and suicidal ideas. The patient is nervous/anxious.        Objective   Physical Exam   Constitutional: He is oriented to person, place, and time. He appears well-developed and well-nourished. No distress.   HENT:   Head: Normocephalic and atraumatic.   Eyes: Pupils are equal, round, and reactive to light. Conjunctivae are normal.   Neck: Normal range of motion. Neck supple. No JVD present. No thyromegaly present.   Cardiovascular: Normal rate, regular rhythm, normal heart sounds and intact distal pulses.   No murmur heard.  Pulmonary/Chest: Effort normal and breath sounds normal. No respiratory distress. He has no wheezes. He exhibits no tenderness.   Abdominal: Soft. Bowel sounds are normal. He exhibits no distension. There is no tenderness.   Musculoskeletal: He exhibits no edema.   Lymphadenopathy:     He has no cervical adenopathy.   Neurological: He is alert and oriented to person, place, and time.   Skin: Skin is warm and dry. Capillary refill takes less than 2 seconds. No erythema.   Psychiatric: His speech is normal and behavior is normal. Judgment and thought content normal. His mood appears anxious. Cognition and memory are normal. He exhibits a depressed mood. He expresses no homicidal and no suicidal ideation. He expresses no suicidal plans and no homicidal plans.   Vitals reviewed.        Assessment/Plan   Chivo was seen today for anxiety.    Diagnoses and all orders for this visit:    Anxiety  Comments:  Pt. has stopped sertraline.   Given samples of Vraylar and instrcuted on use.   Phone follow up in 2 weeks; office f/u 4 wk  Referral to Psych  Orders:  -     Cariprazine HCl (Vraylar) 3 MG capsule capsule; Take 1 capsule by mouth Daily.  -     Ambulatory Referral to Psychiatry  -     hydrOXYzine (ATARAX) 25 MG tablet; Take 1 tablet by mouth Every 6 (Six)  Hours As Needed for Anxiety.    Essential hypertension  Comments:  Given lisinopril 10 mg daily.   Labs today.   Advised to cut back on caffeine; stop smoking.   Monitor BP at home.   Orders:  -     lisinopril (PRINIVIL,ZESTRIL) 10 MG tablet; Take 1 tablet by mouth Daily.  -     Comprehensive metabolic panel; Future  -     Lipid panel; Future

## 2020-09-14 ENCOUNTER — TELEPHONE (OUTPATIENT)
Dept: FAMILY MEDICINE CLINIC | Facility: CLINIC | Age: 40
End: 2020-09-14

## 2020-09-14 DIAGNOSIS — F31.9 BIPOLAR 1 DISORDER (HCC): Primary | ICD-10-CM

## 2020-09-14 RX ORDER — OLANZAPINE 10 MG/1
10 TABLET ORAL NIGHTLY
Qty: 30 TABLET | Refills: 1 | Status: SHIPPED | OUTPATIENT
Start: 2020-09-14 | End: 2020-10-02

## 2020-09-14 NOTE — TELEPHONE ENCOUNTER
Insurance does not cover Vraylar. Preferred alternative is risperidone, quetiapine, or olanzipine.

## 2020-10-02 ENCOUNTER — TELEPHONE (OUTPATIENT)
Dept: FAMILY MEDICINE CLINIC | Facility: CLINIC | Age: 40
End: 2020-10-02

## 2020-10-02 DIAGNOSIS — F41.9 ANXIETY: Primary | ICD-10-CM

## 2020-10-02 RX ORDER — BUSPIRONE HYDROCHLORIDE 5 MG/1
5 TABLET ORAL 3 TIMES DAILY
Qty: 90 TABLET | Refills: 1 | Status: SHIPPED | OUTPATIENT
Start: 2020-10-02 | End: 2020-10-16

## 2020-10-16 ENCOUNTER — OFFICE VISIT (OUTPATIENT)
Dept: FAMILY MEDICINE CLINIC | Facility: CLINIC | Age: 40
End: 2020-10-16

## 2020-10-16 VITALS
WEIGHT: 185 LBS | HEIGHT: 70 IN | SYSTOLIC BLOOD PRESSURE: 105 MMHG | OXYGEN SATURATION: 99 % | HEART RATE: 80 BPM | DIASTOLIC BLOOD PRESSURE: 72 MMHG | BODY MASS INDEX: 26.48 KG/M2 | TEMPERATURE: 97.3 F

## 2020-10-16 DIAGNOSIS — F41.9 ANXIETY: ICD-10-CM

## 2020-10-16 DIAGNOSIS — F33.0 MAJOR DEPRESSIVE DISORDER, RECURRENT, MILD (HCC): Primary | ICD-10-CM

## 2020-10-16 DIAGNOSIS — I10 ESSENTIAL HYPERTENSION: ICD-10-CM

## 2020-10-16 PROCEDURE — 99214 OFFICE O/P EST MOD 30 MIN: CPT | Performed by: NURSE PRACTITIONER

## 2020-10-16 NOTE — PATIENT INSTRUCTIONS
"DASH Eating Plan  DASH stands for \"Dietary Approaches to Stop Hypertension.\" The DASH eating plan is a healthy eating plan that has been shown to reduce high blood pressure (hypertension). It may also reduce your risk for type 2 diabetes, heart disease, and stroke. The DASH eating plan may also help with weight loss.  What are tips for following this plan?    General guidelines  · Avoid eating more than 2,300 mg (milligrams) of salt (sodium) a day. If you have hypertension, you may need to reduce your sodium intake to 1,500 mg a day.  · Limit alcohol intake to no more than 1 drink a day for nonpregnant women and 2 drinks a day for men. One drink equals 12 oz of beer, 5 oz of wine, or 1½ oz of hard liquor.  · Work with your health care provider to maintain a healthy body weight or to lose weight. Ask what an ideal weight is for you.  · Get at least 30 minutes of exercise that causes your heart to beat faster (aerobic exercise) most days of the week. Activities may include walking, swimming, or biking.  · Work with your health care provider or diet and nutrition specialist (dietitian) to adjust your eating plan to your individual calorie needs.  Reading food labels    · Check food labels for the amount of sodium per serving. Choose foods with less than 5 percent of the Daily Value of sodium. Generally, foods with less than 300 mg of sodium per serving fit into this eating plan.  · To find whole grains, look for the word \"whole\" as the first word in the ingredient list.  Shopping  · Buy products labeled as \"low-sodium\" or \"no salt added.\"  · Buy fresh foods. Avoid canned foods and premade or frozen meals.  Cooking  · Avoid adding salt when cooking. Use salt-free seasonings or herbs instead of table salt or sea salt. Check with your health care provider or pharmacist before using salt substitutes.  · Do not pyle foods. Cook foods using healthy methods such as baking, boiling, grilling, and broiling instead.  · Cook with " heart-healthy oils, such as olive, canola, soybean, or sunflower oil.  Meal planning  · Eat a balanced diet that includes:  ? 5 or more servings of fruits and vegetables each day. At each meal, try to fill half of your plate with fruits and vegetables.  ? Up to 6-8 servings of whole grains each day.  ? Less than 6 oz of lean meat, poultry, or fish each day. A 3-oz serving of meat is about the same size as a deck of cards. One egg equals 1 oz.  ? 2 servings of low-fat dairy each day.  ? A serving of nuts, seeds, or beans 5 times each week.  ? Heart-healthy fats. Healthy fats called Omega-3 fatty acids are found in foods such as flaxseeds and coldwater fish, like sardines, salmon, and mackerel.  · Limit how much you eat of the following:  ? Canned or prepackaged foods.  ? Food that is high in trans fat, such as fried foods.  ? Food that is high in saturated fat, such as fatty meat.  ? Sweets, desserts, sugary drinks, and other foods with added sugar.  ? Full-fat dairy products.  · Do not salt foods before eating.  · Try to eat at least 2 vegetarian meals each week.  · Eat more home-cooked food and less restaurant, buffet, and fast food.  · When eating at a restaurant, ask that your food be prepared with less salt or no salt, if possible.  What foods are recommended?  The items listed may not be a complete list. Talk with your dietitian about what dietary choices are best for you.  Grains  Whole-grain or whole-wheat bread. Whole-grain or whole-wheat pasta. Brown rice. Oatmeal. Quinoa. Bulgur. Whole-grain and low-sodium cereals. Seda bread. Low-fat, low-sodium crackers. Whole-wheat flour tortillas.  Vegetables  Fresh or frozen vegetables (raw, steamed, roasted, or grilled). Low-sodium or reduced-sodium tomato and vegetable juice. Low-sodium or reduced-sodium tomato sauce and tomato paste. Low-sodium or reduced-sodium canned vegetables.  Fruits  All fresh, dried, or frozen fruit. Canned fruit in natural juice (without  added sugar).  Meat and other protein foods  Skinless chicken or turkey. Ground chicken or turkey. Pork with fat trimmed off. Fish and seafood. Egg whites. Dried beans, peas, or lentils. Unsalted nuts, nut butters, and seeds. Unsalted canned beans. Lean cuts of beef with fat trimmed off. Low-sodium, lean deli meat.  Dairy  Low-fat (1%) or fat-free (skim) milk. Fat-free, low-fat, or reduced-fat cheeses. Nonfat, low-sodium ricotta or cottage cheese. Low-fat or nonfat yogurt. Low-fat, low-sodium cheese.  Fats and oils  Soft margarine without trans fats. Vegetable oil. Low-fat, reduced-fat, or light mayonnaise and salad dressings (reduced-sodium). Canola, safflower, olive, soybean, and sunflower oils. Avocado.  Seasoning and other foods  Herbs. Spices. Seasoning mixes without salt. Unsalted popcorn and pretzels. Fat-free sweets.  What foods are not recommended?  The items listed may not be a complete list. Talk with your dietitian about what dietary choices are best for you.  Grains  Baked goods made with fat, such as croissants, muffins, or some breads. Dry pasta or rice meal packs.  Vegetables  Creamed or fried vegetables. Vegetables in a cheese sauce. Regular canned vegetables (not low-sodium or reduced-sodium). Regular canned tomato sauce and paste (not low-sodium or reduced-sodium). Regular tomato and vegetable juice (not low-sodium or reduced-sodium). Pickles. Olives.  Fruits  Canned fruit in a light or heavy syrup. Fried fruit. Fruit in cream or butter sauce.  Meat and other protein foods  Fatty cuts of meat. Ribs. Fried meat. Wang. Sausage. Bologna and other processed lunch meats. Salami. Fatback. Hotdogs. Bratwurst. Salted nuts and seeds. Canned beans with added salt. Canned or smoked fish. Whole eggs or egg yolks. Chicken or turkey with skin.  Dairy  Whole or 2% milk, cream, and half-and-half. Whole or full-fat cream cheese. Whole-fat or sweetened yogurt. Full-fat cheese. Nondairy creamers. Whipped toppings.  Processed cheese and cheese spreads.  Fats and oils  Butter. Stick margarine. Lard. Shortening. Ghee. Wang fat. Tropical oils, such as coconut, palm kernel, or palm oil.  Seasoning and other foods  Salted popcorn and pretzels. Onion salt, garlic salt, seasoned salt, table salt, and sea salt. Worcestershire sauce. Tartar sauce. Barbecue sauce. Teriyaki sauce. Soy sauce, including reduced-sodium. Steak sauce. Canned and packaged gravies. Fish sauce. Oyster sauce. Cocktail sauce. Horseradish that you find on the shelf. Ketchup. Mustard. Meat flavorings and tenderizers. Bouillon cubes. Hot sauce and Tabasco sauce. Premade or packaged marinades. Premade or packaged taco seasonings. Relishes. Regular salad dressings.  Where to find more information:  · National Heart, [employer], [employer] [place]: www.nhlbi.nih.gov  · American Heart Association: www.heart.org  Summary  · The DASH eating plan is a healthy eating plan that has been shown to reduce high blood pressure (hypertension). It may also reduce your risk for type 2 diabetes, heart disease, and stroke.  · With the DASH eating plan, you should limit salt (sodium) intake to 2,300 mg a day. If you have hypertension, you may need to reduce your sodium intake to 1,500 mg a day.  · When on the DASH eating plan, aim to eat more fresh fruits and vegetables, whole grains, lean proteins, low-fat dairy, and heart-healthy fats.  · Work with your health care provider or diet and nutrition specialist (dietitian) to adjust your eating plan to your individual calorie needs.  This information is not intended to replace advice given to you by your health care provider. Make sure you discuss any questions you have with your health care provider.  Document Released: 12/06/2012 Document Revised: 11/30/2018 Document Reviewed: 12/11/2017  Elsevier Patient Education © 2020 [employer]

## 2020-10-16 NOTE — PROGRESS NOTES
"John Billy is a 40 y.o. male.       HPI   Pt. Is here today for follow up on anxiety/depression.  Was given Vraylar at visit 4 weeks ago; Vraylar was not covered by insurance.  Olanzapine was sent in for him; he called office on 10/2 stating this med was making him feel very tired all the time.  Med was switched to Buspar 5mg tid.  He stopped the Buspar this week because it caused him to feel \"electric jolts\" when he took it. He did take the samples of Vraylar and says he did like this med.  Had previously failed escitalopram, sertraline and Wellbutrin.  Denies any SI or HI.     He was started on lisinopril 10 mg daily at the last visit.  BP has improved.  Denies any CP; palpitations; SOA; dizziness; headache; trouble with vision.  Monitors BP at home and finds it runs normal.      The following portions of the patient's history were reviewed and updated as appropriate: allergies, current medications, past family history, past medical history, past social history, past surgical history and problem list.    Review of Systems   Constitutional: Negative for activity change, appetite change, chills, diaphoresis, fatigue, fever, unexpected weight gain and unexpected weight loss.   Eyes: Negative for visual disturbance.   Respiratory: Negative for cough, chest tightness, shortness of breath and wheezing.    Cardiovascular: Negative for chest pain, palpitations and leg swelling.   Gastrointestinal: Negative for abdominal distention, abdominal pain, blood in stool, constipation, diarrhea, nausea, vomiting and indigestion.   Genitourinary: Negative for decreased urine volume, dysuria, flank pain, frequency, hematuria, nocturia and urgency.   Musculoskeletal: Negative for arthralgias, back pain, joint swelling and myalgias.   Skin: Negative for rash.   Neurological: Negative for dizziness, tremors, seizures, syncope, facial asymmetry, speech difficulty, weakness, light-headedness, numbness, headache, memory " problem and confusion.   Hematological: Negative for adenopathy.   Psychiatric/Behavioral: Positive for depressed mood and stress. Negative for self-injury, sleep disturbance and suicidal ideas. The patient is nervous/anxious.        Objective   Physical Exam  Vitals signs reviewed.   Constitutional:       General: He is not in acute distress.     Appearance: Normal appearance.   HENT:      Head: Normocephalic and atraumatic.      Nose: Nose normal.      Mouth/Throat:      Mouth: Mucous membranes are moist.      Pharynx: Oropharynx is clear.   Eyes:      Conjunctiva/sclera: Conjunctivae normal.   Neck:      Musculoskeletal: Normal range of motion and neck supple. No muscular tenderness.   Cardiovascular:      Rate and Rhythm: Normal rate and regular rhythm.      Pulses: Normal pulses.      Heart sounds: Normal heart sounds. No murmur.   Pulmonary:      Effort: Pulmonary effort is normal. No respiratory distress.      Breath sounds: Normal breath sounds. No wheezing.   Chest:      Chest wall: No tenderness.   Abdominal:      General: Abdomen is flat. Bowel sounds are normal. There is no distension.      Palpations: Abdomen is soft.      Tenderness: There is no abdominal tenderness. There is no right CVA tenderness or left CVA tenderness.   Musculoskeletal:      Right lower leg: No edema.      Left lower leg: No edema.   Skin:     General: Skin is warm and dry.      Findings: No erythema.   Neurological:      General: No focal deficit present.      Mental Status: He is alert and oriented to person, place, and time.   Psychiatric:         Attention and Perception: Attention normal.         Mood and Affect: Mood is anxious.         Speech: Speech normal.         Behavior: Behavior normal. Behavior is cooperative.         Thought Content: Thought content normal.         Cognition and Memory: Cognition normal.         Judgment: Judgment normal.           Assessment/Plan   Diagnoses and all orders for this visit:    1.  Major depressive disorder, recurrent, mild (CMS/HCC) (Primary)  Comments:  Will restart Vraylar; samples given and co-pay card   Referral to Psych for evaluation.   Follow up in 6 months.   Orders:  -     Ambulatory Referral to Psychiatry  -     Cariprazine HCl (Vraylar) 3 MG capsule capsule; Take 1 capsule by mouth Daily.  Dispense: 90 capsule; Refill: 3    2. Anxiety  Comments:  Will restart Vraylar; samples given and co-pay card   Referral to Psych for evaluation.   Follow up in 6 months.   Orders:  -     Ambulatory Referral to Psychiatry  -     Cariprazine HCl (Vraylar) 3 MG capsule capsule; Take 1 capsule by mouth Daily.  Dispense: 90 capsule; Refill: 3    3. Essential hypertension  Comments:  Stable.   Cont. currnent medication.   Refills sent.

## 2020-10-23 DIAGNOSIS — F33.0 MAJOR DEPRESSIVE DISORDER, RECURRENT, MILD (HCC): ICD-10-CM

## 2020-10-23 DIAGNOSIS — F41.9 ANXIETY: ICD-10-CM

## 2021-02-05 ENCOUNTER — OFFICE VISIT (OUTPATIENT)
Dept: PSYCHIATRY | Facility: CLINIC | Age: 41
End: 2021-02-05

## 2021-02-05 DIAGNOSIS — F41.1 GENERALIZED ANXIETY DISORDER: Chronic | ICD-10-CM

## 2021-02-05 DIAGNOSIS — F31.4 BIPOLAR 1 DISORDER, DEPRESSED, SEVERE (HCC): Primary | Chronic | ICD-10-CM

## 2021-02-05 DIAGNOSIS — F41.9 ANXIETY: ICD-10-CM

## 2021-02-05 PROCEDURE — 90792 PSYCH DIAG EVAL W/MED SRVCS: CPT | Performed by: PSYCHIATRY & NEUROLOGY

## 2021-02-05 RX ORDER — DIVALPROEX SODIUM 250 MG/1
250 TABLET, DELAYED RELEASE ORAL 2 TIMES DAILY
Qty: 60 TABLET | Refills: 2 | Status: SHIPPED | OUTPATIENT
Start: 2021-02-05 | End: 2021-05-07

## 2021-02-05 RX ORDER — HYDROXYZINE HYDROCHLORIDE 25 MG/1
25 TABLET, FILM COATED ORAL EVERY 6 HOURS PRN
Qty: 30 TABLET | Refills: 1 | Status: SHIPPED | OUTPATIENT
Start: 2021-02-05 | End: 2021-05-07 | Stop reason: SDUPTHER

## 2021-02-05 NOTE — PATIENT INSTRUCTIONS
Bipolar 1 Disorder  Bipolar 1 disorder is a mental health disorder in which a person has episodes of emotional highs, or lay, and may also have episodes of lows, or depression. Bipolar 1 disorder is different from other bipolar disorders in that it involves extreme episodes of lay (manic episodes). These episodes last at least one week or involve symptoms that are so severe that hospitalization is needed to keep the person safe.  What are the causes?  The cause of this condition is not known.  What increases the risk?  The following factors may make you more likely to develop this condition:  · Having a family member with the disorder.  · Having an imbalance of certain chemicals in the brain (neurotransmitters).  · Experiencing stress, such as illness, financial problems, or a death.  · Having certain conditions that affect the brain or spinal cord (neurologic conditions).  · Having had a brain injury (trauma).  What are the signs or symptoms?  Symptoms of lay include:  · Very high self-esteem or self-confidence.  · Decreased need for sleep.  · Unusual talkativeness. Speech may be very fast.  · Racing thoughts with quick shifts between topics that may or may not be related (flight of ideas).  · Ability to concentrate either greatly improved or decreased.  · Increased purposeful activity, such as work, studies, or social activity.  · Increased agitation. This could be pacing, squirming, fidgeting, or finger and toe tapping.  · Impulsive behavior and poor judgment. This may result in high-risk activities that are sexual, financial, or physical.  Symptoms of depression include:  · Extreme degrees of sadness, uncontrollable crying, hopelessness, worthlessness, or numbness.  · Sleep problems, such as insomnia, waking early, or sleeping too much.  · No longer enjoying things you used to enjoy.  · Isolation. You may often spend time alone.  · Lack of energy or motivation, and moving more slowly than  normal.  · Trouble making decisions.  · Increased appetite or loss of appetite.  · Thoughts of death, or wanting to harm yourself.  Sometimes, you may have a mixed mood. This means having symptoms of lay and depression at the same time. Stress can often trigger these symptoms.  How is this diagnosed?  This condition may be diagnosed based on:  · Emotional episodes.  · Medical history.  · Use of alcohol, drugs, and prescription medicines. Certain medical conditions and substances can cause symptoms that seem like bipolar disorder. This is called secondary bipolar disorder.  Your health care provider may ask you to take a short test. This helps to understand your symptoms. You may also be asked to see a mental health specialist to follow up on this diagnosis and start treatment.  How is this treated?         This condition is a long-term (chronic) illness. It is often managed with ongoing treatment rather than treatment only when symptoms occur. A combination of treatments is the main approach. Treatment may include:  · Medicine. Medicine can be prescribed by a health care provider who specializes in treating mental health disorders (psychiatrist). Medicines called mood stabilizers are usually prescribed. If symptoms occur during treatment with a mood stabilizer, other medicines may be added.  · Psychotherapy. Some forms of talk therapy, such as cognitive behavioral therapy (CBT) and family therapy, can help with learning to manage bipolar disorder.  · Psychoeducation. This helps you and others understand how this disorder is managed. Include friends and family in educational sessions so they learn how best to support you.  · Methods of managing your condition, such as journaling or relaxation exercises. Relaxation exercises include:  ? Yoga.  ? Meditation.  ? Deep breathing.  · Lifestyle changes, such as:  ? Limiting alcohol and drug use.  ? Exercising regularly.  ? Structuring when you go to bed and get  up.  ? Eating a healthy diet.  · Electroconvulsive therapy (ECT). This is a procedure in which electricity is applied to the brain through the scalp. It may be used in cases of severe bipolar disorder when medicine and psychotherapy work too slowly or do not work.  Follow these instructions at home:  Activity  · Return to your normal activities as told by your health care provider.  · Find activities that you enjoy, and make time to do them.  · Exercise regularly as told by your health care provider.  Lifestyle    · Follow a set schedule for eating and sleeping.  · Eat a healthy diet that includes fresh fruits and vegetables, whole grains, low-fat dairy, and lean meat.  · Get at least 7-8 hours of sleep each night.  · Avoid using products that contain nicotine or tobacco. If you want help quitting, ask your health care provider.  · Do not use drugs.  Alcohol use  · Do not drink alcohol if:  ? Your health care provider tells you not to drink.  ? You are pregnant, may be pregnant, or are planning to become pregnant.  · If you drink alcohol:  ? Limit how much you use to:  § 0-1 drink a day for women.  § 0-2 drinks a day for men.  ? Be aware of how much alcohol is in your drink. In the [place] one drink equals one 12 oz bottle of beer (355 mL), one 5 oz glass of wine (148 mL), or one 1½ oz glass of hard liquor (44 mL).  General instructions  · Take over-the-counter and prescription medicines only as told by your health care provider. You may think about stopping your medicine, but it is very important to take all your medicine as prescribed.  · Consider joining a support group. Your health care provider may be able to recommend one.  · Talk with your family and friends about your treatment goals and how they can help.  · Keep all follow-up visits as told by your health care provider. This is important.  Where to find more information  · National [place] on Mental Illness: www.mar[place].org  · National [place] of Mental  Health: www.[facility].gov  Contact a health care provider if:  · Your symptoms get worse, or your loved ones tell you that your symptoms are getting worse.  · You have uncomfortable side effects from your medicine.  · You have trouble sleeping.  · You have trouble doing daily activities.  · You feel unsafe in your surroundings.  · You are self-medicating with alcohol or drugs.  Get help right away if:  · You have new symptoms.  · You have thoughts about harming yourself or others.  · You are considering suicide.  If you ever feel like you may hurt yourself or others, or have thoughts about taking your own life, get help right away. You can go to your nearest emergency department or call:  · Your local emergency services (911 in the [place]  · A suicide crisis helpline, such as the National Suicide Prevention Lifeline at 1-784.571.2792. This is open 24 hours a day.  Summary  · Bipolar 1 disorder is a lifelong mental health disorder in which a person has episodes of lay and depression.  · This disorder is mainly treated with a combination of medicines, talk and behavioral therapies, and, often, electroconvulsive therapy (ECT).  · Include friends and family in educational sessions so they know how best to support you.  · Get help right away if you are considering suicide.  This information is not intended to replace advice given to you by your health care provider. Make sure you discuss any questions you have with your health care provider.  Document Revised: 06/02/2020 Document Reviewed: 06/02/2020  Elsevier Patient Education © 2020 [employer]

## 2021-02-05 NOTE — PROGRESS NOTES
"Subjective   Chivo Billy is a 40 y.o. male who presents today for initial evaluation     Chief Complaint:  \"I have been stressed and depressed\"     History of Present Illness: the pt suffered from depression for the last 2 years after his father passed away, feels depressed all the time, has his moments, he had incident with his wife , he sent angry messages to his wife because she would not let him see his daughter , now has legal problems  The pt c/o anger issues, increased irritability , increased E for 2-3 days  He had transient visual hallucinations when he was depressed after his father passed away   Anxiety - can not stop worrying about everything   Sleep - hard to fall asleep and staying asleep, almost al the time, no connection with mood changes, 3-4 hrs of sleep total on average         The following portions of the patient's history were reviewed and updated as appropriate: allergies, current medications, past family history, past medical history, past social history, past surgical history and problem list.    PAST PSYCHIATRIC HISTORY  Axis I  Affective/Bipolar Disorder, Anxiety/Panic Disorder, no inpt,   No SAs   Axis II  Defer     PAST OUTPATIENT TREATMENT  Diagnosis treated:  Affective Disorder, Anxiety/Panic Disorder  Treatment Type:  Psychotherapy, Medication Management  Prior Psychiatric Medications:  vraylar - 3 mg - restlessness   lexapro - more irritable   wellbutrin - made more depressed     Support Groups:  Not now, but will start anger management groups on Feb 23   Sequelae Of Mental Disorder:  job disruption, family disruption, emotional distress          Interval History  Deteriorated    Side Effects  Not now, akathisia on vraylar       Past Medical History:  Past Medical History:   Diagnosis Date   • Depression    • Hyperlipidemia        Social History:  Social History     Socioeconomic History   • Marital status: Single     Spouse name: Not on file   • Number of children: Not on file   • " Years of education: Not on file   • Highest education level: Not on file   Tobacco Use   • Smoking status: Current Every Day Smoker     Packs/day: 2.50     Types: Cigarettes   • Smokeless tobacco: Never Used   Substance and Sexual Activity   • Alcohol use: Yes     Frequency: Monthly or less     Comment: socially   • Drug use: Not Currently     Frequency: 7.0 times per week     Types: Marijuana   • Sexual activity: Defer      2 children , has a custody of 1  16 yo son, they live together     Family History:  Family History   Problem Relation Age of Onset   • Psoriasis Father    • Heart disease Maternal Grandmother    • Heart disease Paternal Grandmother    • Diabetes Paternal Grandmother    • Alzheimer's disease Paternal Grandmother    • Hypertension Brother    • Bipolar disorder Mother        Past Surgical History:  Past Surgical History:   Procedure Laterality Date   • HERNIA REPAIR         Problem List:  Patient Active Problem List   Diagnosis   • Bipolar 1 disorder, depressed, severe (CMS/HCC)   • Generalized anxiety disorder       Allergy:   No Known Allergies     Discontinued Medications:  Medications Discontinued During This Encounter   Medication Reason   • hydrOXYzine (ATARAX) 25 MG tablet Reorder   • Cariprazine HCl (Vraylar) 3 MG capsule capsule Side effects       Current Medications:   Current Outpatient Medications   Medication Sig Dispense Refill   • divalproex (Depakote) 250 MG DR tablet Take 1 tablet by mouth 2 (Two) Times a Day. 60 tablet 2   • hydrOXYzine (ATARAX) 25 MG tablet Take 1 tablet by mouth Every 6 (Six) Hours As Needed for Anxiety. 30 tablet 1   • lisinopril (PRINIVIL,ZESTRIL) 10 MG tablet Take 1 tablet by mouth Daily. 30 tablet 2     No current facility-administered medications for this visit.          Psychological ROS: positive for - anxiety, hostility and irritability  negative for - concentration difficulties, depression, hallucinations, memory difficulties, mood swings or  suicidal ideation      Physical Exam:   There were no vitals taken for this visit.    Mental Status Exam:   Hygiene:   good  Cooperation:  Cooperative  Eye Contact:  Fair  Psychomotor Behavior:  Appropriate  Affect:  Restricted  Mood: depressed  Hopelessness: Denies  Speech:  Normal  Thought Process:  Goal directed and Linear  Thought Content:  Mood congruent  Suicidal:  None  Homicidal:  None  Hallucinations:  not now   Delusion:  None  Memory:  Intact  Orientation:  Person, Place, Time and Situation  Reliability:  fair  Insight:  Fair  Judgement:  Fair  Impulse Control:  limited   Physical/Medical Issues:  No        PHQ-9 Depression Screening  Little interest or pleasure in doing things? 3   Feeling down, depressed, or hopeless? 3   Trouble falling or staying asleep, or sleeping too much? 2   Feeling tired or having little energy? 2   Poor appetite or overeating? 0   Feeling bad about yourself - or that you are a failure or have let yourself or your family down? 3   Trouble concentrating on things, such as reading the newspaper or watching television? 1   Moving or speaking so slowly that other people could have noticed? Or the opposite - being so fidgety or restless that you have been moving around a lot more than usual? 0   Thoughts that you would be better off dead, or of hurting yourself in some way? 0   PHQ-9 Total Score 14   If you checked off any problems, how difficult have these problems made it for you to do your work, take care of things at home, or get along with other people? Extremely dIfficult           Current every day smoker 3-10 mintues spent counseling Has reduced [place] use    I advised Chivo of the risks of tobacco use.     Lab Results:   No visits with results within 3 Month(s) from this visit.   Latest known visit with results is:   Lab on 09/11/2020   Component Date Value Ref Range Status   • Glucose 09/11/2020 83  65 - 99 mg/dL Final   • BUN 09/11/2020 11  6 - 20 mg/dL Final   •  Creatinine 09/11/2020 0.86  0.76 - 1.27 mg/dL Final   • Sodium 09/11/2020 140  136 - 145 mmol/L Final   • Potassium 09/11/2020 4.1  3.5 - 5.2 mmol/L Final   • Chloride 09/11/2020 109* 98 - 107 mmol/L Final   • CO2 09/11/2020 24.0  22.0 - 29.0 mmol/L Final   • Calcium 09/11/2020 9.2  8.6 - 10.5 mg/dL Final   • Total Protein 09/11/2020 6.3  6.0 - 8.5 g/dL Final   • Albumin 09/11/2020 3.80  3.50 - 5.20 g/dL Final   • ALT (SGPT) 09/11/2020 13  1 - 41 U/L Final   • AST (SGOT) 09/11/2020 11  1 - 40 U/L Final   • Alkaline Phosphatase 09/11/2020 67  39 - 117 U/L Final   • Total Bilirubin 09/11/2020 0.5  0.0 - 1.2 mg/dL Final   • eGFR Non African Amer 09/11/2020 100  >60 mL/min/1.73 Final   • Globulin 09/11/2020 2.5  gm/dL Final   • A/G Ratio 09/11/2020 1.5  g/dL Final   • BUN/Creatinine Ratio 09/11/2020 12.8  7.0 - 25.0 Final   • Anion Gap 09/11/2020 7.0  5.0 - 15.0 mmol/L Final   • Total Cholesterol 09/11/2020 182  0 - 200 mg/dL Final   • Triglycerides 09/11/2020 91  0 - 150 mg/dL Final   • HDL Cholesterol 09/11/2020 36* 40 - 60 mg/dL Final   • LDL Cholesterol  09/11/2020 128* 0 - 100 mg/dL Final   • VLDL Cholesterol 09/11/2020 18.2  5 - 40 mg/dL Final   • LDL/HDL Ratio 09/11/2020 3.55   Final       Assessment/Plan   Problems Addressed this Visit        Other    Bipolar 1 disorder, depressed, severe (CMS/HCC) - Primary (Chronic)    Relevant Medications    divalproex (Depakote) 250 MG DR tablet    hydrOXYzine (ATARAX) 25 MG tablet    Generalized anxiety disorder (Chronic)    Relevant Medications    hydrOXYzine (ATARAX) 25 MG tablet      Other Visit Diagnoses     Anxiety        Pt. has stopped sertraline.   Given samples of Vraylar and instrcuted on use.   Phone follow up in 2 weeks; office f/u 4 wk  Referral to Psych    Relevant Medications    hydrOXYzine (ATARAX) 25 MG tablet      Diagnoses       Codes Comments    Bipolar 1 disorder, depressed, severe (CMS/HCC)    -  Primary ICD-10-CM: F31.4  ICD-9-CM: 296.53      Generalized anxiety disorder     ICD-10-CM: F41.1  ICD-9-CM: 300.02     Anxiety     ICD-10-CM: F41.9  ICD-9-CM: 300.00 Pt. has stopped sertraline.   Given samples of Vraylar and instrcuted on use.   Phone follow up in 2 weeks; office f/u 4 wk  Referral to Psych          Visit Diagnoses:    ICD-10-CM ICD-9-CM   1. Bipolar 1 disorder, depressed, severe (CMS/HCC)  F31.4 296.53   2. Generalized anxiety disorder  F41.1 300.02   3. Anxiety  F41.9 300.00       TREATMENT PLAN/GOALS: Continue supportive psychotherapy efforts and medications as indicated. Treatment and medication options discussed during today's visit. Patient ackowledged and verbally consented to continue with current treatment plan and was educated on the importance of compliance with treatment and follow-up appointments.    MEDICATION ISSUES:  1. Bipolar d/o moderate, depressed - start deapkote 250 mg po BID, will check the level and adjust the dose if indicated,anger management groups   2. OTIS - hydroxyzine 25 mg po TID PRN     INSPECT reviewed as expected - hydrocodone in Nov 2012 for 3 days   PHQ 14 - moderate depression     Discussed medication options and treatment plan of prescribed medication as well as the risks, benefits, and side effects including potential falls, possible impaired driving and metabolic adversities among others. Patient is agreeable to call the office with any worsening of symptoms or onset of side effects. Patient is agreeable to call 911 or go to the nearest ER should he/she begin having SI/HI. No medication side effects or related complaints today.     MEDS ORDERED DURING VISIT:  New Medications Ordered This Visit   Medications   • divalproex (Depakote) 250 MG DR tablet     Sig: Take 1 tablet by mouth 2 (Two) Times a Day.     Dispense:  60 tablet     Refill:  2   • hydrOXYzine (ATARAX) 25 MG tablet     Sig: Take 1 tablet by mouth Every 6 (Six) Hours As Needed for Anxiety.     Dispense:  30 tablet     Refill:  1       Return in  about 3 months (around 5/5/2021).         This document has been electronically signed by Jahaira Jenkins MD  February 5, 2021 10:35 EST    EMR Dragon transcription disclaimer:  Some of this encounter note is an electronic transcription translation of spoken language to printed text. The electronic translation of spoken language may permit erroneous, or at times, nonsensical words or phrases to be inadvertently transcribed; Although I have reviewed the note for such errors some may still exist.

## 2021-04-16 ENCOUNTER — OFFICE VISIT (OUTPATIENT)
Dept: FAMILY MEDICINE CLINIC | Facility: CLINIC | Age: 41
End: 2021-04-16

## 2021-04-16 ENCOUNTER — LAB (OUTPATIENT)
Dept: FAMILY MEDICINE CLINIC | Facility: CLINIC | Age: 41
End: 2021-04-16

## 2021-04-16 VITALS
BODY MASS INDEX: 28.92 KG/M2 | HEIGHT: 70 IN | TEMPERATURE: 97.3 F | HEART RATE: 95 BPM | WEIGHT: 202 LBS | DIASTOLIC BLOOD PRESSURE: 96 MMHG | OXYGEN SATURATION: 98 % | SYSTOLIC BLOOD PRESSURE: 142 MMHG

## 2021-04-16 DIAGNOSIS — I10 ESSENTIAL HYPERTENSION: ICD-10-CM

## 2021-04-16 DIAGNOSIS — R53.83 FATIGUE, UNSPECIFIED TYPE: ICD-10-CM

## 2021-04-16 DIAGNOSIS — I10 ESSENTIAL HYPERTENSION: Primary | ICD-10-CM

## 2021-04-16 LAB
25(OH)D3 SERPL-MCNC: 15.7 NG/ML
ALBUMIN SERPL-MCNC: 4.4 G/DL (ref 3.5–5.2)
ALBUMIN/GLOB SERPL: 2 G/DL
ALP SERPL-CCNC: 54 U/L (ref 39–117)
ALT SERPL W P-5'-P-CCNC: 11 U/L (ref 1–41)
ANION GAP SERPL CALCULATED.3IONS-SCNC: 9.2 MMOL/L (ref 5–15)
AST SERPL-CCNC: 10 U/L (ref 1–40)
BILIRUB SERPL-MCNC: 0.2 MG/DL (ref 0–1.2)
BUN SERPL-MCNC: 18 MG/DL (ref 6–20)
BUN/CREAT SERPL: 19.4 (ref 7–25)
CALCIUM SPEC-SCNC: 9.5 MG/DL (ref 8.6–10.5)
CHLORIDE SERPL-SCNC: 108 MMOL/L (ref 98–107)
CHOLEST SERPL-MCNC: 211 MG/DL (ref 0–200)
CO2 SERPL-SCNC: 25.8 MMOL/L (ref 22–29)
CREAT SERPL-MCNC: 0.93 MG/DL (ref 0.76–1.27)
GFR SERPL CREATININE-BSD FRML MDRD: 90 ML/MIN/1.73
GLOBULIN UR ELPH-MCNC: 2.2 GM/DL
GLUCOSE SERPL-MCNC: 91 MG/DL (ref 65–99)
HDLC SERPL-MCNC: 36 MG/DL (ref 40–60)
LDLC SERPL CALC-MCNC: 161 MG/DL (ref 0–100)
LDLC/HDLC SERPL: 4.43 {RATIO}
POTASSIUM SERPL-SCNC: 4.6 MMOL/L (ref 3.5–5.2)
PROT SERPL-MCNC: 6.6 G/DL (ref 6–8.5)
SODIUM SERPL-SCNC: 143 MMOL/L (ref 136–145)
TRIGL SERPL-MCNC: 78 MG/DL (ref 0–150)
TSH SERPL DL<=0.05 MIU/L-ACNC: 2.17 UIU/ML (ref 0.27–4.2)
VIT B12 BLD-MCNC: 664 PG/ML (ref 211–946)
VLDLC SERPL-MCNC: 14 MG/DL (ref 5–40)

## 2021-04-16 PROCEDURE — 84443 ASSAY THYROID STIM HORMONE: CPT | Performed by: NURSE PRACTITIONER

## 2021-04-16 PROCEDURE — 80053 COMPREHEN METABOLIC PANEL: CPT | Performed by: NURSE PRACTITIONER

## 2021-04-16 PROCEDURE — 99214 OFFICE O/P EST MOD 30 MIN: CPT | Performed by: NURSE PRACTITIONER

## 2021-04-16 PROCEDURE — 36415 COLL VENOUS BLD VENIPUNCTURE: CPT

## 2021-04-16 PROCEDURE — 82607 VITAMIN B-12: CPT | Performed by: NURSE PRACTITIONER

## 2021-04-16 PROCEDURE — 82306 VITAMIN D 25 HYDROXY: CPT | Performed by: NURSE PRACTITIONER

## 2021-04-16 PROCEDURE — 80061 LIPID PANEL: CPT | Performed by: NURSE PRACTITIONER

## 2021-04-16 PROCEDURE — 84402 ASSAY OF FREE TESTOSTERONE: CPT | Performed by: NURSE PRACTITIONER

## 2021-04-16 PROCEDURE — 84403 ASSAY OF TOTAL TESTOSTERONE: CPT | Performed by: NURSE PRACTITIONER

## 2021-04-16 RX ORDER — LISINOPRIL 5 MG/1
5 TABLET ORAL DAILY
Qty: 30 TABLET | Refills: 3 | Status: SHIPPED | OUTPATIENT
Start: 2021-04-16 | End: 2021-08-10

## 2021-04-16 NOTE — PATIENT INSTRUCTIONS
"https://www.nhlbi.nih.gov/files/docs/public/heart/dash_brief.pdf\">   DASH Eating Plan  DASH stands for Dietary Approaches to Stop Hypertension. The DASH eating plan is a healthy eating plan that has been shown to:  · Reduce high blood pressure (hypertension).  · Reduce your risk for type 2 diabetes, heart disease, and stroke.  · Help with weight loss.  What are tips for following this plan?  Reading food labels  · Check food labels for the amount of salt (sodium) per serving. Choose foods with less than 5 percent of the Daily Value of sodium. Generally, foods with less than 300 milligrams (mg) of sodium per serving fit into this eating plan.  · To find whole grains, look for the word \"whole\" as the first word in the ingredient list.  Shopping  · Buy products labeled as \"low-sodium\" or \"no salt added.\"  · Buy fresh foods. Avoid canned foods and pre-made or frozen meals.  Cooking  · Avoid adding salt when cooking. Use salt-free seasonings or herbs instead of table salt or sea salt. Check with your health care provider or pharmacist before using salt substitutes.  · Do not pyle foods. Cook foods using healthy methods such as baking, boiling, grilling, roasting, and broiling instead.  · Cook with heart-healthy oils, such as olive, canola, avocado, soybean, or sunflower oil.  Meal planning    · Eat a balanced diet that includes:  ? 4 or more servings of fruits and 4 or more servings of vegetables each day. Try to fill one-half of your plate with fruits and vegetables.  ? 6-8 servings of whole grains each day.  ? Less than 6 oz (170 g) of lean meat, poultry, or fish each day. A 3-oz (85-g) serving of meat is about the same size as a deck of cards. One egg equals 1 oz (28 g).  ? 2-3 servings of low-fat dairy each day. One serving is 1 cup (237 mL).  ? 1 serving of nuts, seeds, or beans 5 times each week.  ? 2-3 servings of heart-healthy fats. Healthy fats called omega-3 fatty acids are found in foods such as walnuts, " flaxseeds, fortified milks, and eggs. These fats are also found in cold-water fish, such as sardines, salmon, and mackerel.  · Limit how much you eat of:  ? Canned or prepackaged foods.  ? Food that is high in trans fat, such as some fried foods.  ? Food that is high in saturated fat, such as fatty meat.  ? Desserts and other sweets, sugary drinks, and other foods with added sugar.  ? Full-fat dairy products.  · Do not salt foods before eating.  · Do not eat more than 4 egg yolks a week.  · Try to eat at least 2 vegetarian meals a week.  · Eat more home-cooked food and less restaurant, buffet, and fast food.  Lifestyle  · When eating at a restaurant, ask that your food be prepared with less salt or no salt, if possible.  · If you drink alcohol:  ? Limit how much you use to:  § 0-1 drink a day for women who are not pregnant.  § 0-2 drinks a day for men.  ? Be aware of how much alcohol is in your drink. In the [place] one drink equals one 12 oz bottle of beer (355 mL), one 5 oz glass of wine (148 mL), or one 1½ oz glass of hard liquor (44 mL).  General information  · Avoid eating more than 2,300 mg of salt a day. If you have hypertension, you may need to reduce your sodium intake to 1,500 mg a day.  · Work with your health care provider to maintain a healthy body weight or to lose weight. Ask what an ideal weight is for you.  · Get at least 30 minutes of exercise that causes your heart to beat faster (aerobic exercise) most days of the week. Activities may include walking, swimming, or biking.  · Work with your health care provider or dietitian to adjust your eating plan to your individual calorie needs.  What foods should I eat?  Fruits  All fresh, dried, or frozen fruit. Canned fruit in natural juice (without added sugar).  Vegetables  Fresh or frozen vegetables (raw, steamed, roasted, or grilled). Low-sodium or reduced-sodium tomato and vegetable juice. Low-sodium or reduced-sodium tomato sauce and tomato paste.  Low-sodium or reduced-sodium canned vegetables.  Grains  Whole-grain or whole-wheat bread. Whole-grain or whole-wheat pasta. Brown rice. Oatmeal. Quinoa. Bulgur. Whole-grain and low-sodium cereals. Seda bread. Low-fat, low-sodium crackers. Whole-wheat flour tortillas.  Meats and other proteins  Skinless chicken or turkey. Ground chicken or turkey. Pork with fat trimmed off. Fish and seafood. Egg whites. Dried beans, peas, or lentils. Unsalted nuts, nut butters, and seeds. Unsalted canned beans. Lean cuts of beef with fat trimmed off. Low-sodium, lean precooked or cured meat, such as sausages or meat loaves.  Dairy  Low-fat (1%) or fat-free (skim) milk. Reduced-fat, low-fat, or fat-free cheeses. Nonfat, low-sodium ricotta or cottage cheese. Low-fat or nonfat yogurt. Low-fat, low-sodium cheese.  Fats and oils  Soft margarine without trans fats. Vegetable oil. Reduced-fat, low-fat, or light mayonnaise and salad dressings (reduced-sodium). Canola, safflower, olive, avocado, soybean, and sunflower oils. Avocado.  Seasonings and condiments  Herbs. Spices. Seasoning mixes without salt.  Other foods  Unsalted popcorn and pretzels. Fat-free sweets.  The items listed above may not be a complete list of foods and beverages you can eat. Contact a dietitian for more information.  What foods should I avoid?  Fruits  Canned fruit in a light or heavy syrup. Fried fruit. Fruit in cream or butter sauce.  Vegetables  Creamed or fried vegetables. Vegetables in a cheese sauce. Regular canned vegetables (not low-sodium or reduced-sodium). Regular canned tomato sauce and paste (not low-sodium or reduced-sodium). Regular tomato and vegetable juice (not low-sodium or reduced-sodium). Pickles. Olives.  Grains  Baked goods made with fat, such as croissants, muffins, or some breads. Dry pasta or rice meal packs.  Meats and other proteins  Fatty cuts of meat. Ribs. Fried meat. Wang. Bologna, salami, and other precooked or cured meats, such as  sausages or meat loaves. Fat from the back of a pig (fatback). Bratwurst. Salted nuts and seeds. Canned beans with added salt. Canned or smoked fish. Whole eggs or egg yolks. Chicken or turkey with skin.  Dairy  Whole or 2% milk, cream, and half-and-half. Whole or full-fat cream cheese. Whole-fat or sweetened yogurt. Full-fat cheese. Nondairy creamers. Whipped toppings. Processed cheese and cheese spreads.  Fats and oils  Butter. Stick margarine. Lard. Shortening. Ghee. Wang fat. Tropical oils, such as coconut, palm kernel, or palm oil.  Seasonings and condiments  Onion salt, garlic salt, seasoned salt, table salt, and sea salt. Worcestershire sauce. Tartar sauce. Barbecue sauce. Teriyaki sauce. Soy sauce, including reduced-sodium. Steak sauce. Canned and packaged gravies. Fish sauce. Oyster sauce. Cocktail sauce. Store-bought horseradish. Ketchup. Mustard. Meat flavorings and tenderizers. Bouillon cubes. Hot sauces. Pre-made or packaged marinades. Pre-made or packaged taco seasonings. Relishes. Regular salad dressings.  Other foods  Salted popcorn and pretzels.  The items listed above may not be a complete list of foods and beverages you should avoid. Contact a dietitian for more information.  Where to find more information  · National Heart, Lung, and Blood Hye: www.nhlbi.nih.gov  · American Heart Association: www.heart.org  · Academy of Nutrition and Dietetics: www.eatright.org  · National Kidney Foundation: www.kidney.org  Summary  · The DASH eating plan is a healthy eating plan that has been shown to reduce high blood pressure (hypertension). It may also reduce your risk for type 2 diabetes, heart disease, and stroke.  · When on the DASH eating plan, aim to eat more fresh fruits and vegetables, whole grains, lean proteins, low-fat dairy, and heart-healthy fats.  · With the DASH eating plan, you should limit salt (sodium) intake to 2,300 mg a day. If you have hypertension, you may need to reduce your  sodium intake to 1,500 mg a day.  · Work with your health care provider or dietitian to adjust your eating plan to your individual calorie needs.  This information is not intended to replace advice given to you by your health care provider. Make sure you discuss any questions you have with your health care provider.  Document Revised: 11/20/2020 Document Reviewed: 11/20/2020  [employer] Patient Education © 2021 [employer]

## 2021-04-16 NOTE — PROGRESS NOTES
Subjective   Chivo Billy is a 41 y.o. male.       HPI   Pt. Is here today for follow up on HTN.  He reports he stopped his BP med in Jan. because he felt the BP was too low.   Denies any CP; palpitations; SOA; dizziness; headache; trouble with vision.  He will occasionally monitor the BP at home now.    He also reports an increase in fatigue.  Symptoms started over the past few months.  He is now off home arrest and is able to get out and be more active.  He wants to get back in shape but finds he has no energy and fatigues easily.  He continues to smoke 1.5 ppd and is working to cut this down even more.    The following portions of the patient's history were reviewed and updated as appropriate: allergies, current medications, past family history, past medical history, past social history, past surgical history and problem list.    Review of Systems   Constitutional: Positive for fatigue. Negative for activity change, appetite change, chills, diaphoresis, fever, unexpected weight gain and unexpected weight loss.   Eyes: Negative for blurred vision and visual disturbance.   Respiratory: Negative for cough, chest tightness, shortness of breath and wheezing.    Cardiovascular: Negative for chest pain, palpitations and leg swelling.   Gastrointestinal: Negative for abdominal distention, abdominal pain, blood in stool, constipation, diarrhea, nausea, vomiting and indigestion.   Endocrine: Negative for cold intolerance, heat intolerance, polydipsia, polyphagia and polyuria.   Genitourinary: Negative for difficulty urinating, dysuria, flank pain, frequency and urgency.   Musculoskeletal: Negative for arthralgias, back pain and myalgias.   Skin: Negative for rash.   Neurological: Negative for dizziness, weakness, light-headedness, headache and confusion.   Psychiatric/Behavioral: Negative for depressed mood. The patient is not nervous/anxious.        Objective   Physical Exam  Vitals reviewed.   Constitutional:        General: He is not in acute distress.     Appearance: Normal appearance.   HENT:      Head: Normocephalic and atraumatic.   Cardiovascular:      Rate and Rhythm: Normal rate and regular rhythm.      Pulses: Normal pulses.      Heart sounds: Normal heart sounds. No murmur heard.     Pulmonary:      Effort: Pulmonary effort is normal. No respiratory distress.      Breath sounds: Normal breath sounds. No wheezing or rhonchi.   Chest:      Chest wall: No tenderness.   Abdominal:      General: Abdomen is flat. Bowel sounds are normal. There is no distension.      Palpations: Abdomen is soft.      Tenderness: There is no abdominal tenderness. There is no right CVA tenderness or left CVA tenderness.   Musculoskeletal:      Cervical back: Normal range of motion and neck supple. No tenderness.      Right lower leg: No edema.      Left lower leg: No edema.   Skin:     General: Skin is warm and dry.      Findings: No erythema.   Neurological:      General: No focal deficit present.      Mental Status: He is alert and oriented to person, place, and time.   Psychiatric:         Mood and Affect: Mood normal.           Assessment/Plan   Diagnoses and all orders for this visit:    1. Essential hypertension (Primary)  Comments:  Will start lisinopril 5 mg daily.   Monitor BP at home; call in readings in one week for review.    Labs today.  Advised to stop smoking.   Orders:  -     lisinopril (PRINIVIL,ZESTRIL) 5 MG tablet; Take 1 tablet by mouth Daily.  Dispense: 30 tablet; Refill: 3  -     Comprehensive metabolic panel; Future  -     Lipid panel; Future    2. Fatigue, unspecified type  Comments:  Labs today.   Orders:  -     Comprehensive metabolic panel; Future  -     Lipid panel; Future  -     TSH; Future  -     Vitamin B12; Future  -     Vitamin D 25 hydroxy; Future  -     Testosterone (Free & Total), LC / MS; Future

## 2021-04-19 LAB
TESTOST FREE SERPL-MCNC: 13.6 PG/ML (ref 8.7–25.1)
TESTOST SERPL-MCNC: 461.7 NG/DL (ref 264–916)

## 2021-04-20 DIAGNOSIS — E55.9 VITAMIN D DEFICIENCY: Primary | ICD-10-CM

## 2021-04-20 RX ORDER — ERGOCALCIFEROL 1.25 MG/1
50000 CAPSULE ORAL WEEKLY
Qty: 12 CAPSULE | Refills: 1 | Status: SHIPPED | OUTPATIENT
Start: 2021-04-20 | End: 2021-12-06

## 2021-05-07 ENCOUNTER — OFFICE VISIT (OUTPATIENT)
Dept: PSYCHIATRY | Facility: CLINIC | Age: 41
End: 2021-05-07

## 2021-05-07 DIAGNOSIS — F41.1 GENERALIZED ANXIETY DISORDER: Chronic | ICD-10-CM

## 2021-05-07 DIAGNOSIS — F31.4 BIPOLAR 1 DISORDER, DEPRESSED, SEVERE (HCC): Primary | Chronic | ICD-10-CM

## 2021-05-07 PROCEDURE — 99214 OFFICE O/P EST MOD 30 MIN: CPT | Performed by: PSYCHIATRY & NEUROLOGY

## 2021-05-07 RX ORDER — DIVALPROEX SODIUM 250 MG/1
250 TABLET, DELAYED RELEASE ORAL 3 TIMES DAILY
Qty: 90 TABLET | Refills: 5 | Status: SHIPPED | OUTPATIENT
Start: 2021-05-07 | End: 2021-05-16

## 2021-05-07 RX ORDER — HYDROXYZINE HYDROCHLORIDE 25 MG/1
25 TABLET, FILM COATED ORAL EVERY 6 HOURS PRN
Qty: 30 TABLET | Refills: 5 | Status: SHIPPED | OUTPATIENT
Start: 2021-05-07 | End: 2021-11-05 | Stop reason: SDUPTHER

## 2021-05-07 NOTE — PROGRESS NOTES
Subjective   Chivo Billy is a 41 y.o. male who presents today for follow up    Chief Complaint:  Depression anxiety      History of Present Illness: the pt suffered from depression for the last 2 years after his father passed away, feels depressed all the time, has his moments, he had incident with his wife , he sent angry messages to his wife because she would not let him see his daughter , now has legal problems  The pt c/o anger issues, increased irritability , increased E for 2-3 days  He had transient visual hallucinations when he was depressed after his father passed away   Anxiety - can not stop worrying about everything   Sleep - hard to fall asleep and staying asleep, almost al the time, no connection with mood changes, 3-4 hrs of sleep total on average   Today the reported feeling better overall after he was started on depakote, noticed decreased irritability , but feels it does not last long, also noticed changes in mood when he does not take it       The following portions of the patient's history were reviewed and updated as appropriate: allergies, current medications, past family history, past medical history, past social history, past surgical history and problem list.    PAST PSYCHIATRIC HISTORY  Axis I  Affective/Bipolar Disorder, Anxiety/Panic Disorder, no inpt,   No SAs   Axis II  Defer     PAST OUTPATIENT TREATMENT  Diagnosis treated:  Affective Disorder, Anxiety/Panic Disorder  Treatment Type:  Psychotherapy, Medication Management  Prior Psychiatric Medications:  vraylar - 3 mg - restlessness   lexapro - more irritable   wellbutrin - made more depressed     Support Groups:  Not now, but will start anger management groups on Feb 23   Sequelae Of Mental Disorder:  job disruption, family disruption, emotional distress          Interval History  Improved     Side Effects  Not now, akathisia on vraylar       Past Medical History:  Past Medical History:   Diagnosis Date   • Depression    •  Hyperlipidemia        Social History:  Social History     Socioeconomic History   • Marital status: Single     Spouse name: Not on file   • Number of children: Not on file   • Years of education: Not on file   • Highest education level: Not on file   Tobacco Use   • Smoking status: Current Every Day Smoker     Packs/day: 1.50     Types: Cigarettes   • Smokeless tobacco: Never Used   Vaping Use   • Vaping Use: Never used   Substance and Sexual Activity   • Alcohol use: Yes     Comment: socially   • Drug use: Not Currently     Frequency: 7.0 times per week     Types: Marijuana   • Sexual activity: Defer      2 children , has a custody of 1  18 yo son, they live together     Family History:  Family History   Problem Relation Age of Onset   • Psoriasis Father    • Heart disease Maternal Grandmother    • Heart disease Paternal Grandmother    • Diabetes Paternal Grandmother    • Alzheimer's disease Paternal Grandmother    • Hypertension Brother    • Bipolar disorder Mother        Past Surgical History:  Past Surgical History:   Procedure Laterality Date   • HERNIA REPAIR         Problem List:  Patient Active Problem List   Diagnosis   • Bipolar 1 disorder, depressed, severe (CMS/[facility])   • Generalized anxiety disorder       Allergy:   No Known Allergies     Discontinued Medications:  Medications Discontinued During This Encounter   Medication Reason   • divalproex (Depakote) 250 MG DR tablet    • hydrOXYzine (ATARAX) 25 MG tablet Reorder       Current Medications:   Current Outpatient Medications   Medication Sig Dispense Refill   • divalproex (Depakote) 250 MG DR tablet Take 1 tablet by mouth 3 (Three) Times a Day. 90 tablet 5   • hydrOXYzine (ATARAX) 25 MG tablet Take 1 tablet by mouth Every 6 (Six) Hours As Needed for Anxiety. 30 tablet 5   • lisinopril (PRINIVIL,ZESTRIL) 5 MG tablet Take 1 tablet by mouth Daily. 30 tablet 3   • vitamin D (ERGOCALCIFEROL) 1.25 MG (86741 UT) capsule capsule Take 1 capsule by mouth  1 (One) Time Per Week. 12 capsule 1     No current facility-administered medications for this visit.         Psychological ROS: positive for - anxiety, irritability  negative for - concentration difficulties, depression, hallucinations, memory difficulties, mood swings or suicidal ideation      Physical Exam:   There were no vitals taken for this visit.    Mental Status Exam:   Hygiene:   good  Cooperation:  Cooperative  Eye Contact:  Fair  Psychomotor Behavior:  Appropriate  Affect:  Appropriate  Mood: fluctates  Hopelessness: Denies  Speech:  Normal  Thought Process:  Goal directed and Linear  Thought Content:  Mood congruent  Suicidal:  None  Homicidal:  None  Hallucinations:  not now   Delusion:  None  Memory:  Intact  Orientation:  Person, Place, Time and Situation  Reliability:  fair  Insight:  Fair  Judgement:  Fair  Impulse Control:  limited   Physical/Medical Issues:  No      MSE from 2/5/2021 reviewed and accepted with changes     PHQ-9 Depression Screening  Little interest or pleasure in doing things? 2   Feeling down, depressed, or hopeless? 2   Trouble falling or staying asleep, or sleeping too much? 0   Feeling tired or having little energy? 1   Poor appetite or overeating? 0   Feeling bad about yourself - or that you are a failure or have let yourself or your family down? 1   Trouble concentrating on things, such as reading the newspaper or watching television? 0   Moving or speaking so slowly that other people could have noticed? Or the opposite - being so fidgety or restless that you have been moving around a lot more than usual? 0   Thoughts that you would be better off dead, or of hurting yourself in some way? 0   PHQ-9 Total Score 6   If you checked off any problems, how difficult have these problems made it for you to do your work, take care of things at home, or get along with other people? Somewhat difficult           Current every day smoker 3-10 mintues spent counseling Has reduced Tobbacos  use    I advised Chivo of the risks of tobacco use.     Lab Results:   Lab on 04/16/2021   Component Date Value Ref Range Status   • Glucose 04/16/2021 91  65 - 99 mg/dL Final   • BUN 04/16/2021 18  6 - 20 mg/dL Final   • Creatinine 04/16/2021 0.93  0.76 - 1.27 mg/dL Final   • Sodium 04/16/2021 143  136 - 145 mmol/L Final   • Potassium 04/16/2021 4.6  3.5 - 5.2 mmol/L Final   • Chloride 04/16/2021 108* 98 - 107 mmol/L Final   • CO2 04/16/2021 25.8  22.0 - 29.0 mmol/L Final   • Calcium 04/16/2021 9.5  8.6 - 10.5 mg/dL Final   • Total Protein 04/16/2021 6.6  6.0 - 8.5 g/dL Final   • Albumin 04/16/2021 4.40  3.50 - 5.20 g/dL Final   • ALT (SGPT) 04/16/2021 11  1 - 41 U/L Final   • AST (SGOT) 04/16/2021 10  1 - 40 U/L Final   • Alkaline Phosphatase 04/16/2021 54  39 - 117 U/L Final   • Total Bilirubin 04/16/2021 0.2  0.0 - 1.2 mg/dL Final   • eGFR Non African Amer 04/16/2021 90  >60 mL/min/1.73 Final   • Globulin 04/16/2021 2.2  gm/dL Final   • A/G Ratio 04/16/2021 2.0  g/dL Final   • BUN/Creatinine Ratio 04/16/2021 19.4  7.0 - 25.0 Final   • Anion Gap 04/16/2021 9.2  5.0 - 15.0 mmol/L Final   • Total Cholesterol 04/16/2021 211* 0 - 200 mg/dL Final   • Triglycerides 04/16/2021 78  0 - 150 mg/dL Final   • HDL Cholesterol 04/16/2021 36* 40 - 60 mg/dL Final   • LDL Cholesterol  04/16/2021 161* 0 - 100 mg/dL Final   • VLDL Cholesterol 04/16/2021 14  5 - 40 mg/dL Final   • LDL/HDL Ratio 04/16/2021 4.43   Final   • TSH 04/16/2021 2.170  0.270 - 4.200 uIU/mL Final   • Vitamin B-12 04/16/2021 664  211 - 946 pg/mL Final   • 25 Hydroxy, Vitamin D 04/16/2021 15.7  ng/ml Final   • Testosterone, Total 04/16/2021 461.7  264.0 - 916.0 ng/dL Final    This [facility] LC/MS-MS method is currently certified by the CDC  Hormone Standardization Program (HoSt). Adult male reference  interval is based on a population of healthy nonobese males  (BMI <30) between 19 and 39 years old. Compa, et.al. JCEM  2017,102;6260-6350. PMID: 86308840.   •  Testosterone, Free 04/16/2021 13.6  8.7 - 25.1 pg/mL Final       Assessment/Plan   Problems Addressed this Visit        Mental Health    Bipolar 1 disorder, depressed, severe (CMS/HCC) - Primary (Chronic)    Relevant Medications    divalproex (Depakote) 250 MG DR tablet    hydrOXYzine (ATARAX) 25 MG tablet    Generalized anxiety disorder (Chronic)    Relevant Medications    hydrOXYzine (ATARAX) 25 MG tablet      Diagnoses       Codes Comments    Bipolar 1 disorder, depressed, severe (CMS/HCC)    -  Primary ICD-10-CM: F31.4  ICD-9-CM: 296.53     Generalized anxiety disorder     ICD-10-CM: F41.1  ICD-9-CM: 300.02 Pt. has stopped sertraline.   Given samples of Vraylar and instrcuted on use.   Phone follow up in 2 weeks; office f/u 4 wk  Referral to Psych          Visit Diagnoses:    ICD-10-CM ICD-9-CM   1. Bipolar 1 disorder, depressed, severe (CMS/HCC)  F31.4 296.53   2. Generalized anxiety disorder  F41.1 300.02       TREATMENT PLAN/GOALS: Continue supportive psychotherapy efforts and medications as indicated. Treatment and medication options discussed during today's visit. Patient ackowledged and verbally consented to continue with current treatment plan and was educated on the importance of compliance with treatment and follow-up appointments.    MEDICATION ISSUES:  1. Bipolar d/o moderate, depressed - increase  deapkote to 250 mg TID  , will check the level and adjust the dose if indicated, anger management groups   2. OTIS - hydroxyzine 25 mg po TID PRN     INSPECT reviewed as expected - hydrocodone in Nov 2012 for 3 days   PHQ 6  - mild depression     Discussed medication options and treatment plan of prescribed medication as well as the risks, benefits, and side effects including potential falls, possible impaired driving and metabolic adversities among others. Patient is agreeable to call the office with any worsening of symptoms or onset of side effects. Patient is agreeable to call 911 or go to the nearest ER  should he/she begin having SI/HI. No medication side effects or related complaints today.     MEDS ORDERED DURING VISIT:  New Medications Ordered This Visit   Medications   • divalproex (Depakote) 250 MG DR tablet     Sig: Take 1 tablet by mouth 3 (Three) Times a Day.     Dispense:  90 tablet     Refill:  5   • hydrOXYzine (ATARAX) 25 MG tablet     Sig: Take 1 tablet by mouth Every 6 (Six) Hours As Needed for Anxiety.     Dispense:  30 tablet     Refill:  5       Return in about 6 months (around 11/7/2021).         This document has been electronically signed by Jahaira Jenkins MD  May 7, 2021 08:52 EDT    EMR Dragon transcription disclaimer:  Some of this encounter note is an electronic transcription translation of spoken language to printed text. The electronic translation of spoken language may permit erroneous, or at times, nonsensical words or phrases to be inadvertently transcribed; Although I have reviewed the note for such errors some may still exist.

## 2021-05-07 NOTE — PATIENT INSTRUCTIONS
"[email or web address]   Generalized Anxiety Disorder, Adult  Generalized anxiety disorder (OTIS) is a mental health condition. Unlike normal worries, anxiety related to OTIS is not triggered by a specific event. These worries do not fade or get better with time. OTIS interferes with relationships, work, and school.  OTIS symptoms can vary from mild to severe. People with severe OTIS can have intense waves of anxiety with physical symptoms that are similar to panic attacks.  What are the causes?  The exact cause of OTIS is not known, but the following are believed to have an impact:  · Differences in natural brain chemicals.  · Genes passed down from parents to children.  · Differences in the way threats are perceived.  · Development during childhood.  · Personality.  What increases the risk?  The following factors may make you more likely to develop this condition:  · Being female.  · Having a family history of anxiety disorders.  · Being very shy.  · Experiencing very stressful life events, such as the death of a loved one.  · Having a very stressful family environment.  What are the signs or symptoms?  People with OTIS often worry excessively about many things in their lives, such as their health and family. Symptoms may also include:  · Mental and emotional symptoms:  ? Worrying excessively about natural disasters.  ? Fear of being late.  ? Difficulty concentrating.  ? Fears that others are judging your performance.  · Physical symptoms:  ? Fatigue.  ? Headaches, muscle tension, muscle twitches, trembling, or feeling shaky.  ? Feeling like your heart is pounding or beating very fast.  ? Feeling out of breath or like you cannot take a deep breath.  ? Having trouble falling asleep or staying asleep, or experiencing restlessness.  ? Sweating.  ? Nausea, diarrhea, or irritable bowel syndrome (IBS).  · Behavioral symptoms:  ? Experiencing erratic moods or irritability.  ? Avoidance of new situations.  ? Avoidance of " people.  ? Extreme difficulty making decisions.  How is this diagnosed?  This condition is diagnosed based on your symptoms and medical history. You will also have a physical exam. Your health care provider may perform tests to rule out other possible causes of your symptoms.  To be diagnosed with OTIS, a person must have anxiety that:  · Is out of his or her control.  · Affects several different aspects of his or her life, such as work and relationships.  · Causes distress that makes him or her unable to take part in normal activities.  · Includes at least three symptoms of OTIS, such as restlessness, fatigue, trouble concentrating, irritability, muscle tension, or sleep problems.  Before your health care provider can confirm a diagnosis of OTIS, these symptoms must be present more days than they are not, and they must last for 6 months or longer.  How is this treated?  This condition may be treated with:  · Medicine. Antidepressant medicine is usually prescribed for long-term daily control. Anti-anxiety medicines may be added in severe cases, especially when panic attacks occur.  · Talk therapy (psychotherapy). Certain types of talk therapy can be helpful in treating OTIS by providing support, education, and guidance. Options include:  ? Cognitive behavioral therapy (CBT). People learn coping skills and self-calming techniques to ease their physical symptoms. They learn to identify unrealistic thoughts and behaviors and to replace them with more appropriate thoughts and behaviors.  ? Acceptance and commitment therapy (ACT). This treatment teaches people how to be mindful as a way to cope with unwanted thoughts and feelings.  ? Biofeedback. This process trains you to manage your body's response (physiological response) through breathing techniques and relaxation methods. You will work with a therapist while machines are used to monitor your physical symptoms.  · Stress management techniques. These include yoga,  meditation, and exercise.  A mental health specialist can help determine which treatment is best for you. Some people see improvement with one type of therapy. However, other people require a combination of therapies.  Follow these instructions at home:  Lifestyle  · Maintain a consistent routine and schedule.  · Anticipate stressful situations. Create a plan, and allow extra time to work with your plan.  · Practice stress management or self-calming techniques that you have learned from your therapist or your health care provider.  General instructions  · Take over-the-counter and prescription medicines only as told by your health care provider.  · Understand that you are likely to have setbacks. Accept this and be kind to yourself as you persist to take better care of yourself.  · Recognize and accept your accomplishments, even if you  them as small.  · Keep all follow-up visits as told by your health care provider. This is important.  Contact a health care provider if:  · Your symptoms do not get better.  · Your symptoms get worse.  · You have signs of depression, such as:  ? A persistently sad or irritable mood.  ? Loss of enjoyment in activities that used to bring you margi.  ? Change in weight or eating.  ? Changes in sleeping habits.  ? Avoiding friends or family members.  ? Loss of energy for normal tasks.  ? Feelings of guilt or worthlessness.  Get help right away if:  · You have serious thoughts about hurting yourself or others.  If you ever feel like you may hurt yourself or others, or have thoughts about taking your own life, get help right away. Go to your nearest emergency department or:  · Call your local emergency services (781 in the [place]  · Call a suicide crisis helpline, such as the National Suicide Prevention Lifeline at 1-340.232.3015. This is open 24 hours a day in the [place]  · Text the Crisis Text Line at 570386 (in the [place]  Summary  · Generalized anxiety disorder (OTIS) is a mental  health condition that involves worry that is not triggered by a specific event.  · People with OTIS often worry excessively about many things in their lives, such as their health and family.  · OTIS may cause symptoms such as restlessness, trouble concentrating, sleep problems, frequent sweating, nausea, diarrhea, headaches, and trembling or muscle twitching.  · A mental health specialist can help determine which treatment is best for you. Some people see improvement with one type of therapy. However, other people require a combination of therapies.  This information is not intended to replace advice given to you by your health care provider. Make sure you discuss any questions you have with your health care provider.  Document Revised: 10/07/2020 Document Reviewed: 10/07/2020  Elsevier Patient Education © 2021 [employer]

## 2021-05-15 DIAGNOSIS — F31.4 BIPOLAR 1 DISORDER, DEPRESSED, SEVERE (HCC): Chronic | ICD-10-CM

## 2021-05-16 RX ORDER — DIVALPROEX SODIUM 250 MG/1
TABLET, DELAYED RELEASE ORAL
Qty: 60 TABLET | Refills: 1 | Status: SHIPPED | OUTPATIENT
Start: 2021-05-16 | End: 2021-10-15

## 2021-08-10 DIAGNOSIS — I10 ESSENTIAL HYPERTENSION: ICD-10-CM

## 2021-08-10 RX ORDER — LISINOPRIL 5 MG/1
5 TABLET ORAL DAILY
Qty: 30 TABLET | Refills: 3 | Status: SHIPPED | OUTPATIENT
Start: 2021-08-10 | End: 2021-10-15 | Stop reason: SDUPTHER

## 2021-08-23 ENCOUNTER — OFFICE VISIT (OUTPATIENT)
Dept: FAMILY MEDICINE CLINIC | Facility: CLINIC | Age: 41
End: 2021-08-23

## 2021-08-23 VITALS
BODY MASS INDEX: 30.96 KG/M2 | HEART RATE: 84 BPM | DIASTOLIC BLOOD PRESSURE: 80 MMHG | SYSTOLIC BLOOD PRESSURE: 113 MMHG | TEMPERATURE: 98.2 F | WEIGHT: 215.8 LBS | OXYGEN SATURATION: 99 %

## 2021-08-23 DIAGNOSIS — F31.9 BIPOLAR 1 DISORDER (HCC): ICD-10-CM

## 2021-08-23 DIAGNOSIS — L23.7 POISON IVY: Primary | ICD-10-CM

## 2021-08-23 PROCEDURE — 96372 THER/PROPH/DIAG INJ SC/IM: CPT | Performed by: FAMILY MEDICINE

## 2021-08-23 PROCEDURE — 99212 OFFICE O/P EST SF 10 MIN: CPT | Performed by: FAMILY MEDICINE

## 2021-08-23 RX ORDER — METHYLPREDNISOLONE ACETATE 80 MG/ML
80 INJECTION, SUSPENSION INTRA-ARTICULAR; INTRALESIONAL; INTRAMUSCULAR; SOFT TISSUE ONCE
Status: COMPLETED | OUTPATIENT
Start: 2021-08-23 | End: 2021-08-23

## 2021-08-23 RX ORDER — ARIPIPRAZOLE 10 MG/1
10 TABLET ORAL DAILY
Qty: 30 TABLET | Refills: 1 | Status: SHIPPED | OUTPATIENT
Start: 2021-08-23 | End: 2021-10-15

## 2021-08-23 RX ORDER — PREDNISONE 10 MG/1
TABLET ORAL
Qty: 40 TABLET | Refills: 0 | Status: SHIPPED | OUTPATIENT
Start: 2021-08-23 | End: 2021-10-15

## 2021-08-23 RX ADMIN — METHYLPREDNISOLONE ACETATE 80 MG: 80 INJECTION, SUSPENSION INTRA-ARTICULAR; INTRALESIONAL; INTRAMUSCULAR; SOFT TISSUE at 14:28

## 2021-08-23 NOTE — PROGRESS NOTES
Subjective   Chivo Billy is a 41 y.o. male.     Chivo Billy is in for an acute rash. There is no history of chest pain or dyspnea.  He also is on Depakote for bipolar disorder but it is causing some weight gain and erectile dysfunction and he would like a change.  He did respond more favorably to Vraylar but it was too expensive to continue.  There is no history of issue with bowel or bladder dysfunction. There is no history of dizziness or lightheadedness. There is no history of issue with sleep or mood. There is no history of issue with present medication.            /80 (BP Location: Left arm, Patient Position: Sitting, Cuff Size: Large Adult)   Pulse 84   Temp 98.2 °F (36.8 °C) (Temporal)   Wt 97.9 kg (215 lb 12.8 oz)   SpO2 99%   BMI 30.96 kg/m²       Chief Complaint   Patient presents with   • Rash     poison ivy - ankles up to sides now - woke up with it yesterday morning spread more           Current Outpatient Medications:   •  divalproex (DEPAKOTE) 250 MG DR tablet, TAKE 1 TABLET BY MOUTH TWICE DAILY, Disp: 60 tablet, Rfl: 1  •  hydrOXYzine (ATARAX) 25 MG tablet, Take 1 tablet by mouth Every 6 (Six) Hours As Needed for Anxiety., Disp: 30 tablet, Rfl: 5  •  lisinopril (PRINIVIL,ZESTRIL) 5 MG tablet, TAKE 1 TABLET BY MOUTH DAILY, Disp: 30 tablet, Rfl: 3  •  vitamin D (ERGOCALCIFEROL) 1.25 MG (54885 UT) capsule capsule, Take 1 capsule by mouth 1 (One) Time Per Week., Disp: 12 capsule, Rfl: 1  •  ARIPiprazole (ABILIFY) 10 MG tablet, Take 1 tablet by mouth Daily., Disp: 30 tablet, Rfl: 1  •  predniSONE (DELTASONE) 10 MG tablet, Take 4 tabs po daily x 4 d, then 3 tabs po daily x 4 d , then 2 tabs po daily x 4 d, then 1 tab po daily x 4 d, Disp: 40 tablet, Rfl: 0  No current facility-administered medications for this visit.        The following portions of the patient's history were reviewed and updated as appropriate: allergies, current medications, past family history, past medical history, past  social history, past surgical history, and problem list.    Review of Systems   Constitutional: Negative for activity change, fatigue and fever.   HENT: Negative for congestion, sinus pressure, sinus pain, sore throat and trouble swallowing.    Eyes: Negative for visual disturbance.   Respiratory: Negative for chest tightness, shortness of breath and wheezing.    Cardiovascular: Negative for chest pain.   Gastrointestinal: Negative for abdominal distention, abdominal pain, constipation, diarrhea, nausea and vomiting.   Genitourinary: Negative for difficulty urinating and dysuria.   Musculoskeletal: Negative for back pain and neck pain.   Skin: Positive for rash.   Psychiatric/Behavioral: Negative for agitation, hallucinations and suicidal ideas.       Objective   Physical Exam  Vitals and nursing note reviewed.   Cardiovascular:      Rate and Rhythm: Normal rate and regular rhythm.   Pulmonary:      Effort: Pulmonary effort is normal.   Abdominal:      General: Bowel sounds are normal.      Palpations: Abdomen is soft.   Skin:     Findings: Rash (Significant poison ivy rash on both lower extremities) present.   Neurological:      Mental Status: He is alert.   Psychiatric:         Mood and Affect: Mood normal.           Assessment/Plan   Problems Addressed this Visit     None      Visit Diagnoses     Poison ivy    -  Primary    Relevant Medications    methylPREDNISolone acetate (DEPO-medrol) injection 80 mg (Completed)    Bipolar 1 disorder (CMS/[facility])        Relevant Medications    ARIPiprazole (ABILIFY) 10 MG tablet      Diagnoses       Codes Comments    Poison ivy    -  Primary ICD-10-CM: L23.7  ICD-9-CM: 692.6     Bipolar 1 disorder (CMS/HCC)     ICD-10-CM: F31.9  ICD-9-CM: 296.7         I will give him some steroids both injectable and oral for the poison ivy  I will try him on some generic aripiprazole in place of the Depakote for the bipolar disorder  He had responded fairly well the Vraylar just could not  afford it  This should hopefully avoid side effect  He is to continue following with psychiatry and touch base with us as needed

## 2021-10-15 ENCOUNTER — OFFICE VISIT (OUTPATIENT)
Dept: FAMILY MEDICINE CLINIC | Facility: CLINIC | Age: 41
End: 2021-10-15

## 2021-10-15 ENCOUNTER — LAB (OUTPATIENT)
Dept: FAMILY MEDICINE CLINIC | Facility: CLINIC | Age: 41
End: 2021-10-15

## 2021-10-15 VITALS
OXYGEN SATURATION: 96 % | HEART RATE: 87 BPM | DIASTOLIC BLOOD PRESSURE: 85 MMHG | HEIGHT: 70 IN | SYSTOLIC BLOOD PRESSURE: 121 MMHG | BODY MASS INDEX: 30.92 KG/M2 | WEIGHT: 216 LBS

## 2021-10-15 DIAGNOSIS — E78.2 MIXED HYPERLIPIDEMIA: ICD-10-CM

## 2021-10-15 DIAGNOSIS — E55.9 VITAMIN D DEFICIENCY: ICD-10-CM

## 2021-10-15 DIAGNOSIS — I10 ESSENTIAL HYPERTENSION: ICD-10-CM

## 2021-10-15 DIAGNOSIS — I10 ESSENTIAL HYPERTENSION: Primary | ICD-10-CM

## 2021-10-15 LAB
25(OH)D3 SERPL-MCNC: 39.1 NG/ML
ALBUMIN SERPL-MCNC: 4.6 G/DL (ref 3.5–5.2)
ALBUMIN/GLOB SERPL: 1.9 G/DL
ALP SERPL-CCNC: 68 U/L (ref 39–117)
ALT SERPL W P-5'-P-CCNC: 33 U/L (ref 1–41)
ANION GAP SERPL CALCULATED.3IONS-SCNC: 10.1 MMOL/L (ref 5–15)
AST SERPL-CCNC: 16 U/L (ref 1–40)
BILIRUB SERPL-MCNC: 0.7 MG/DL (ref 0–1.2)
BUN SERPL-MCNC: 16 MG/DL (ref 6–20)
BUN/CREAT SERPL: 15.7 (ref 7–25)
CALCIUM SPEC-SCNC: 9.1 MG/DL (ref 8.6–10.5)
CHLORIDE SERPL-SCNC: 105 MMOL/L (ref 98–107)
CHOLEST SERPL-MCNC: 249 MG/DL (ref 0–200)
CO2 SERPL-SCNC: 24.9 MMOL/L (ref 22–29)
CREAT SERPL-MCNC: 1.02 MG/DL (ref 0.76–1.27)
GFR SERPL CREATININE-BSD FRML MDRD: 81 ML/MIN/1.73
GLOBULIN UR ELPH-MCNC: 2.4 GM/DL
GLUCOSE SERPL-MCNC: 89 MG/DL (ref 65–99)
HDLC SERPL-MCNC: 33 MG/DL (ref 40–60)
LDLC SERPL CALC-MCNC: 188 MG/DL (ref 0–100)
LDLC/HDLC SERPL: 5.62 {RATIO}
POTASSIUM SERPL-SCNC: 4 MMOL/L (ref 3.5–5.2)
PROT SERPL-MCNC: 7 G/DL (ref 6–8.5)
SODIUM SERPL-SCNC: 140 MMOL/L (ref 136–145)
TRIGL SERPL-MCNC: 152 MG/DL (ref 0–150)
VLDLC SERPL-MCNC: 28 MG/DL (ref 5–40)

## 2021-10-15 PROCEDURE — 80053 COMPREHEN METABOLIC PANEL: CPT | Performed by: NURSE PRACTITIONER

## 2021-10-15 PROCEDURE — 82306 VITAMIN D 25 HYDROXY: CPT | Performed by: NURSE PRACTITIONER

## 2021-10-15 PROCEDURE — 80061 LIPID PANEL: CPT | Performed by: NURSE PRACTITIONER

## 2021-10-15 PROCEDURE — 36415 COLL VENOUS BLD VENIPUNCTURE: CPT

## 2021-10-15 PROCEDURE — 99214 OFFICE O/P EST MOD 30 MIN: CPT | Performed by: NURSE PRACTITIONER

## 2021-10-15 RX ORDER — LISINOPRIL 5 MG/1
5 TABLET ORAL DAILY
Qty: 90 TABLET | Refills: 3 | Status: SHIPPED | OUTPATIENT
Start: 2021-10-15 | End: 2022-04-15 | Stop reason: SDUPTHER

## 2021-10-15 NOTE — PROGRESS NOTES
Subjective   Chivo Billy is a 41 y.o. male.       HPI   Pt. is here today for follow up on medication.    1) HTN - Currently on lisinopril 5 mg daily. BP is stable.  Denies any CP; palpitations; SOA; dizziness; headache; trouble with vision.  Quit smoking 2 weeks ago.  Working on decreasing soda intake.   2) Hyperlipidemia -  Not currently on medication.  Working to improve diet; increasing exercise.   3) Vitamin D def - currently on weekly 50,000 IU supplement.     The following portions of the patient's history were reviewed and updated as appropriate: allergies, current medications, past family history, past medical history, past social history, past surgical history and problem list.    Review of Systems   Constitutional: Negative for activity change, appetite change, chills, diaphoresis, fatigue, fever, unexpected weight gain and unexpected weight loss.   Eyes: Negative for visual disturbance.   Respiratory: Negative for cough, chest tightness, shortness of breath and wheezing.    Cardiovascular: Negative for chest pain, palpitations and leg swelling.   Gastrointestinal: Negative for diarrhea, nausea and vomiting.   Neurological: Negative for dizziness, weakness, light-headedness, headache and confusion.   Psychiatric/Behavioral: Negative for depressed mood. The patient is not nervous/anxious.        Objective   Physical Exam  Vitals reviewed.   Constitutional:       General: He is not in acute distress.     Appearance: Normal appearance.   Cardiovascular:      Rate and Rhythm: Normal rate and regular rhythm.      Pulses: Normal pulses.      Heart sounds: Normal heart sounds. No murmur heard.      Pulmonary:      Effort: Pulmonary effort is normal. No respiratory distress.      Breath sounds: Normal breath sounds. No wheezing.   Chest:      Chest wall: No tenderness.   Abdominal:      Tenderness: There is no right CVA tenderness or left CVA tenderness.   Musculoskeletal:      Cervical back: Normal range of  motion and neck supple. No tenderness.   Skin:     General: Skin is warm and dry.      Findings: No erythema.   Neurological:      General: No focal deficit present.      Mental Status: He is alert and oriented to person, place, and time.   Psychiatric:         Mood and Affect: Mood normal.           Assessment/Plan   Diagnoses and all orders for this visit:    1. Essential hypertension (Primary)  Comments:  Stable.   Cont. current medication.   Labs today.   RTO in 6 mo.   Orders:  -     Comprehensive metabolic panel; Future  -     Lipid panel; Future  -     lisinopril (PRINIVIL,ZESTRIL) 5 MG tablet; Take 1 tablet by mouth Daily.  Dispense: 90 tablet; Refill: 3    2. Mixed hyperlipidemia  Comments:  Labs today.    Cont. working on healthy lifestyle.   Orders:  -     Comprehensive metabolic panel; Future  -     Lipid panel; Future    3. Vitamin D deficiency  Comments:  Stable.   Cont. current medication.   Labs today.   RTO in 6 mo.   Orders:  -     Vitamin D 25 hydroxy; Future

## 2021-10-15 NOTE — PATIENT INSTRUCTIONS
"https://www.nhlbi.nih.gov/files/docs/public/heart/dash_brief.pdf\">   DASH Eating Plan  DASH stands for Dietary Approaches to Stop Hypertension. The DASH eating plan is a healthy eating plan that has been shown to:  · Reduce high blood pressure (hypertension).  · Reduce your risk for type 2 diabetes, heart disease, and stroke.  · Help with weight loss.  What are tips for following this plan?  Reading food labels  · Check food labels for the amount of salt (sodium) per serving. Choose foods with less than 5 percent of the Daily Value of sodium. Generally, foods with less than 300 milligrams (mg) of sodium per serving fit into this eating plan.  · To find whole grains, look for the word \"whole\" as the first word in the ingredient list.  Shopping  · Buy products labeled as \"low-sodium\" or \"no salt added.\"  · Buy fresh foods. Avoid canned foods and pre-made or frozen meals.  Cooking  · Avoid adding salt when cooking. Use salt-free seasonings or herbs instead of table salt or sea salt. Check with your health care provider or pharmacist before using salt substitutes.  · Do not pyle foods. Cook foods using healthy methods such as baking, boiling, grilling, roasting, and broiling instead.  · Cook with heart-healthy oils, such as olive, canola, avocado, soybean, or sunflower oil.  Meal planning    · Eat a balanced diet that includes:  ? 4 or more servings of fruits and 4 or more servings of vegetables each day. Try to fill one-half of your plate with fruits and vegetables.  ? 6-8 servings of whole grains each day.  ? Less than 6 oz (170 g) of lean meat, poultry, or fish each day. A 3-oz (85-g) serving of meat is about the same size as a deck of cards. One egg equals 1 oz (28 g).  ? 2-3 servings of low-fat dairy each day. One serving is 1 cup (237 mL).  ? 1 serving of nuts, seeds, or beans 5 times each week.  ? 2-3 servings of heart-healthy fats. Healthy fats called omega-3 fatty acids are found in foods such as walnuts, " flaxseeds, fortified milks, and eggs. These fats are also found in cold-water fish, such as sardines, salmon, and mackerel.  · Limit how much you eat of:  ? Canned or prepackaged foods.  ? Food that is high in trans fat, such as some fried foods.  ? Food that is high in saturated fat, such as fatty meat.  ? Desserts and other sweets, sugary drinks, and other foods with added sugar.  ? Full-fat dairy products.  · Do not salt foods before eating.  · Do not eat more than 4 egg yolks a week.  · Try to eat at least 2 vegetarian meals a week.  · Eat more home-cooked food and less restaurant, buffet, and fast food.    Lifestyle  · When eating at a restaurant, ask that your food be prepared with less salt or no salt, if possible.  · If you drink alcohol:  ? Limit how much you use to:  § 0-1 drink a day for women who are not pregnant.  § 0-2 drinks a day for men.  ? Be aware of how much alcohol is in your drink. In the [place] one drink equals one 12 oz bottle of beer (355 mL), one 5 oz glass of wine (148 mL), or one 1½ oz glass of hard liquor (44 mL).  General information  · Avoid eating more than 2,300 mg of salt a day. If you have hypertension, you may need to reduce your sodium intake to 1,500 mg a day.  · Work with your health care provider to maintain a healthy body weight or to lose weight. Ask what an ideal weight is for you.  · Get at least 30 minutes of exercise that causes your heart to beat faster (aerobic exercise) most days of the week. Activities may include walking, swimming, or biking.  · Work with your health care provider or dietitian to adjust your eating plan to your individual calorie needs.  What foods should I eat?  Fruits  All fresh, dried, or frozen fruit. Canned fruit in natural juice (without added sugar).  Vegetables  Fresh or frozen vegetables (raw, steamed, roasted, or grilled). Low-sodium or reduced-sodium tomato and vegetable juice. Low-sodium or reduced-sodium tomato sauce and tomato paste.  Low-sodium or reduced-sodium canned vegetables.  Grains  Whole-grain or whole-wheat bread. Whole-grain or whole-wheat pasta. Brown rice. Oatmeal. Quinoa. Bulgur. Whole-grain and low-sodium cereals. Seda bread. Low-fat, low-sodium crackers. Whole-wheat flour tortillas.  Meats and other proteins  Skinless chicken or turkey. Ground chicken or turkey. Pork with fat trimmed off. Fish and seafood. Egg whites. Dried beans, peas, or lentils. Unsalted nuts, nut butters, and seeds. Unsalted canned beans. Lean cuts of beef with fat trimmed off. Low-sodium, lean precooked or cured meat, such as sausages or meat loaves.  Dairy  Low-fat (1%) or fat-free (skim) milk. Reduced-fat, low-fat, or fat-free cheeses. Nonfat, low-sodium ricotta or cottage cheese. Low-fat or nonfat yogurt. Low-fat, low-sodium cheese.  Fats and oils  Soft margarine without trans fats. Vegetable oil. Reduced-fat, low-fat, or light mayonnaise and salad dressings (reduced-sodium). Canola, safflower, olive, avocado, soybean, and sunflower oils. Avocado.  Seasonings and condiments  Herbs. Spices. Seasoning mixes without salt.  Other foods  Unsalted popcorn and pretzels. Fat-free sweets.  The items listed above may not be a complete list of foods and beverages you can eat. Contact a dietitian for more information.  What foods should I avoid?  Fruits  Canned fruit in a light or heavy syrup. Fried fruit. Fruit in cream or butter sauce.  Vegetables  Creamed or fried vegetables. Vegetables in a cheese sauce. Regular canned vegetables (not low-sodium or reduced-sodium). Regular canned tomato sauce and paste (not low-sodium or reduced-sodium). Regular tomato and vegetable juice (not low-sodium or reduced-sodium). Pickles. Olives.  Grains  Baked goods made with fat, such as croissants, muffins, or some breads. Dry pasta or rice meal packs.  Meats and other proteins  Fatty cuts of meat. Ribs. Fried meat. Wang. Bologna, salami, and other precooked or cured meats, such as  sausages or meat loaves. Fat from the back of a pig (fatback). Bratwurst. Salted nuts and seeds. Canned beans with added salt. Canned or smoked fish. Whole eggs or egg yolks. Chicken or turkey with skin.  Dairy  Whole or 2% milk, cream, and half-and-half. Whole or full-fat cream cheese. Whole-fat or sweetened yogurt. Full-fat cheese. Nondairy creamers. Whipped toppings. Processed cheese and cheese spreads.  Fats and oils  Butter. Stick margarine. Lard. Shortening. Ghee. Wang fat. Tropical oils, such as coconut, palm kernel, or palm oil.  Seasonings and condiments  Onion salt, garlic salt, seasoned salt, table salt, and sea salt. Worcestershire sauce. Tartar sauce. Barbecue sauce. Teriyaki sauce. Soy sauce, including reduced-sodium. Steak sauce. Canned and packaged gravies. Fish sauce. Oyster sauce. Cocktail sauce. Store-bought horseradish. Ketchup. Mustard. Meat flavorings and tenderizers. Bouillon cubes. Hot sauces. Pre-made or packaged marinades. Pre-made or packaged taco seasonings. Relishes. Regular salad dressings.  Other foods  Salted popcorn and pretzels.  The items listed above may not be a complete list of foods and beverages you should avoid. Contact a dietitian for more information.  Where to find more information  · National Heart, Lung, and Blood [place]: www.nhlbi.nih.gov  · American Heart Association: www.heart.org  · Academy of Nutrition and Dietetics: www.eatright.org  · National Kidney Foundation: www.kidney.org  Summary  · The DASH eating plan is a healthy eating plan that has been shown to reduce high blood pressure (hypertension). It may also reduce your risk for type 2 diabetes, heart disease, and stroke.  · When on the DASH eating plan, aim to eat more fresh fruits and vegetables, whole grains, lean proteins, low-fat dairy, and heart-healthy fats.  · With the DASH eating plan, you should limit salt (sodium) intake to 2,300 mg a day. If you have hypertension, you may need to reduce your  sodium intake to 1,500 mg a day.  · Work with your health care provider or dietitian to adjust your eating plan to your individual calorie needs.  This information is not intended to replace advice given to you by your health care provider. Make sure you discuss any questions you have with your health care provider.  Document Revised: 11/20/2020 Document Reviewed: 11/20/2020  [employer] Patient Education © 2021 [employer]

## 2021-10-30 DIAGNOSIS — E78.2 MIXED HYPERLIPIDEMIA: Primary | ICD-10-CM

## 2021-10-30 RX ORDER — ATORVASTATIN CALCIUM 10 MG/1
10 TABLET, FILM COATED ORAL DAILY
Qty: 90 TABLET | Refills: 1 | Status: SHIPPED | OUTPATIENT
Start: 2021-10-30 | End: 2022-04-15 | Stop reason: SDUPTHER

## 2021-11-05 ENCOUNTER — OFFICE VISIT (OUTPATIENT)
Dept: PSYCHIATRY | Facility: CLINIC | Age: 41
End: 2021-11-05

## 2021-11-05 DIAGNOSIS — F31.4 BIPOLAR 1 DISORDER, DEPRESSED, SEVERE (HCC): Primary | Chronic | ICD-10-CM

## 2021-11-05 DIAGNOSIS — F41.1 GENERALIZED ANXIETY DISORDER: Chronic | ICD-10-CM

## 2021-11-05 PROCEDURE — 99214 OFFICE O/P EST MOD 30 MIN: CPT | Performed by: PSYCHIATRY & NEUROLOGY

## 2021-11-05 RX ORDER — LURASIDONE HYDROCHLORIDE 20 MG/1
20 TABLET, FILM COATED ORAL DAILY
Qty: 28 TABLET | Refills: 0 | COMMUNITY
Start: 2021-11-05 | End: 2022-03-04 | Stop reason: SINTOL

## 2021-11-05 RX ORDER — HYDROXYZINE HYDROCHLORIDE 25 MG/1
25 TABLET, FILM COATED ORAL 2 TIMES DAILY PRN
Qty: 60 TABLET | Refills: 3 | Status: SHIPPED | OUTPATIENT
Start: 2021-11-05 | End: 2022-03-04

## 2021-11-05 NOTE — PROGRESS NOTES
Subjective   Chivo Billy is a 41 y.o. male who presents today for follow up    Chief Complaint:   anxiety      History of Present Illness: the pt suffered from depression for the last 2 years after his father passed away, feels depressed all the time, has his moments, he had incident with his wife , he sent angry messages to his wife because she would not let him see his daughter , now has legal problems  The pt c/o anger issues, increased irritability , increased E for 2-3 days  He had transient visual hallucinations when he was depressed after his father passed away   Anxiety - can not stop worrying about everything   Sleep - hard to fall asleep and staying asleep, almost al the time, no connection with mood changes, 3-4 hrs of sleep total on average       Today the reported feeling better overall, he bought a new house   He had to stop depakote due to side effects   Mood is leveled   Anxiety - manageable  Sleep - some nights when he can not sleep     The following portions of the patient's history were reviewed and updated as appropriate: allergies, current medications, past family history, past medical history, past social history, past surgical history and problem list.    PAST PSYCHIATRIC HISTORY  Axis I  Affective/Bipolar Disorder, Anxiety/Panic Disorder, no inpt,   No SAs   Axis II  Defer     PAST OUTPATIENT TREATMENT  Diagnosis treated:  Affective Disorder, Anxiety/Panic Disorder  Treatment Type:  Psychotherapy, Medication Management  Prior Psychiatric Medications:  vraylar - 3 mg - restlessness   lexapro - more irritable   wellbutrin - made more depressed   depakote - weight gain and sex side effects  abilify -made more anxious    Support Groups:  Not now, but will start anger management groups on Feb 23   Sequelae Of Mental Disorder:  job disruption, family disruption, emotional distress          Interval History  No changes     Side Effects   akathisia on vraylar   More anxious on abilify   Weight gain  - depakote     Past Medical History:  Past Medical History:   Diagnosis Date   • Depression    • Hyperlipidemia        Social History:  Social History     Socioeconomic History   • Marital status: Single   Tobacco Use   • Smoking status: Former Smoker     Packs/day: 1.50     Types: Cigarettes     Quit date: 10/1/2021     Years since quittin.0   • Smokeless tobacco: Never Used   Vaping Use   • Vaping Use: Never used   Substance and Sexual Activity   • Alcohol use: Yes     Comment: socially   • Drug use: Not Currently     Frequency: 7.0 times per week     Types: Marijuana   • Sexual activity: Defer      2 children , has a custody of 1  16 yo son, they live together     Family History:  Family History   Problem Relation Age of Onset   • Psoriasis Father    • Heart disease Maternal Grandmother    • Heart disease Paternal Grandmother    • Diabetes Paternal Grandmother    • Alzheimer's disease Paternal Grandmother    • Hypertension Brother    • Bipolar disorder Mother        Past Surgical History:  Past Surgical History:   Procedure Laterality Date   • HERNIA REPAIR         Problem List:  Patient Active Problem List   Diagnosis   • Bipolar 1 disorder, depressed, severe (HCC)   • Generalized anxiety disorder       Allergy:   No Known Allergies     Discontinued Medications:  Medications Discontinued During This Encounter   Medication Reason   • hydrOXYzine (ATARAX) 25 MG tablet Reorder       Current Medications:   Current Outpatient Medications   Medication Sig Dispense Refill   • atorvastatin (LIPITOR) 10 MG tablet Take 1 tablet by mouth Daily. 90 tablet 1   • hydrOXYzine (ATARAX) 25 MG tablet Take 1 tablet by mouth 2 (Two) Times a Day As Needed for Anxiety. 60 tablet 3   • lisinopril (PRINIVIL,ZESTRIL) 5 MG tablet Take 1 tablet by mouth Daily. 90 tablet 3   • Lurasidone HCl (Latuda) 20 MG tablet tablet Take 1 tablet by mouth Daily. 28 tablet 0   • vitamin D (ERGOCALCIFEROL) 1.25 MG (91683 UT) capsule capsule  Take 1 capsule by mouth 1 (One) Time Per Week. 12 capsule 1     No current facility-administered medications for this visit.         Psychological ROS: positive for - anxiety, irritability  negative for - concentration difficulties, depression, hallucinations, memory difficulties, mood swings or suicidal ideation      Physical Exam:   There were no vitals taken for this visit.    Mental Status Exam:   Hygiene:   good  Cooperation:  Cooperative  Eye Contact:  Fair  Psychomotor Behavior:  Appropriate  Affect:  Appropriate  Mood: fluctates  Hopelessness: Denies  Speech:  Normal  Thought Process:  Goal directed and Linear  Thought Content:  Mood congruent  Suicidal:  None  Homicidal:  None  Hallucinations:  not now   Delusion:  None  Memory:  Intact  Orientation:  Person, Place, Time and Situation  Reliability:  fair  Insight:  Fair  Judgement:  Fair  Impulse Control:  limited   Physical/Medical Issues:  No      MSE from 5/7/2021 reviewed and accepted with changes     PHQ-9 Depression Screening  Little interest or pleasure in doing things? 1   Feeling down, depressed, or hopeless? 1   Trouble falling or staying asleep, or sleeping too much? 2   Feeling tired or having little energy? 1   Poor appetite or overeating? 0   Feeling bad about yourself - or that you are a failure or have let yourself or your family down? 1   Trouble concentrating on things, such as reading the newspaper or watching television? 1   Moving or speaking so slowly that other people could have noticed? Or the opposite - being so fidgety or restless that you have been moving around a lot more than usual? 0   Thoughts that you would be better off dead, or of hurting yourself in some way? 0   PHQ-9 Total Score 7   If you checked off any problems, how difficult have these problems made it for you to do your work, take care of things at home, or get along with other people? Somewhat difficult           Current every day smoker 3-10 mintues spent  counseling Has reduced [place] use    I advised Chivo of the risks of tobacco use.     Lab Results:   Lab on 10/15/2021   Component Date Value Ref Range Status   • Glucose 10/15/2021 89  65 - 99 mg/dL Final   • BUN 10/15/2021 16  6 - 20 mg/dL Final   • Creatinine 10/15/2021 1.02  0.76 - 1.27 mg/dL Final   • Sodium 10/15/2021 140  136 - 145 mmol/L Final   • Potassium 10/15/2021 4.0  3.5 - 5.2 mmol/L Final   • Chloride 10/15/2021 105  98 - 107 mmol/L Final   • CO2 10/15/2021 24.9  22.0 - 29.0 mmol/L Final   • Calcium 10/15/2021 9.1  8.6 - 10.5 mg/dL Final   • Total Protein 10/15/2021 7.0  6.0 - 8.5 g/dL Final   • Albumin 10/15/2021 4.60  3.50 - 5.20 g/dL Final   • ALT (SGPT) 10/15/2021 33  1 - 41 U/L Final   • AST (SGOT) 10/15/2021 16  1 - 40 U/L Final   • Alkaline Phosphatase 10/15/2021 68  39 - 117 U/L Final   • Total Bilirubin 10/15/2021 0.7  0.0 - 1.2 mg/dL Final   • eGFR Non  Amer 10/15/2021 81  >60 mL/min/1.73 Final   • Globulin 10/15/2021 2.4  gm/dL Final   • A/G Ratio 10/15/2021 1.9  g/dL Final   • BUN/Creatinine Ratio 10/15/2021 15.7  7.0 - 25.0 Final   • Anion Gap 10/15/2021 10.1  5.0 - 15.0 mmol/L Final   • Total Cholesterol 10/15/2021 249* 0 - 200 mg/dL Final   • Triglycerides 10/15/2021 152* 0 - 150 mg/dL Final   • HDL Cholesterol 10/15/2021 33* 40 - 60 mg/dL Final   • LDL Cholesterol  10/15/2021 188* 0 - 100 mg/dL Final   • VLDL Cholesterol 10/15/2021 28  5 - 40 mg/dL Final   • LDL/HDL Ratio 10/15/2021 5.62   Final   • 25 Hydroxy, Vitamin D 10/15/2021 39.1  ng/ml Final       Assessment/Plan   Problems Addressed this Visit        Mental Health    Bipolar 1 disorder, depressed, severe (HCC) - Primary (Chronic)    Relevant Medications    Lurasidone HCl (Latuda) 20 MG tablet tablet    hydrOXYzine (ATARAX) 25 MG tablet    Generalized anxiety disorder (Chronic)    Relevant Medications    Lurasidone HCl (Latuda) 20 MG tablet tablet    hydrOXYzine (ATARAX) 25 MG tablet      Diagnoses       Codes  Comments    Bipolar 1 disorder, depressed, severe (HCC)    -  Primary ICD-10-CM: F31.4  ICD-9-CM: 296.53     Generalized anxiety disorder     ICD-10-CM: F41.1  ICD-9-CM: 300.02           Visit Diagnoses:    ICD-10-CM ICD-9-CM   1. Bipolar 1 disorder, depressed, severe (HCC)  F31.4 296.53   2. Generalized anxiety disorder  F41.1 300.02       TREATMENT PLAN/GOALS: Continue supportive psychotherapy efforts and medications as indicated. Treatment and medication options discussed during today's visit. Patient ackowledged and verbally consented to continue with current treatment plan and was educated on the importance of compliance with treatment and follow-up appointments.    MEDICATION ISSUES:  1. Bipolar d/o moderate, depressed - d/c   deapkote to 250 mg  (side effect)  , was started on abilify - more anxious   Trials of latuda 20 mg     2. OTIS - hydroxyzine 25 mg po TID PRN     INSPECT reviewed as expected - no controlled meds     PHQ 7  - mild depression     Discussed medication options and treatment plan of prescribed medication as well as the risks, benefits, and side effects including potential falls, possible impaired driving and metabolic adversities among others. Patient is agreeable to call the office with any worsening of symptoms or onset of side effects. Patient is agreeable to call 911 or go to the nearest ER should he/she begin having SI/HI. No medication side effects or related complaints today.     MEDS ORDERED DURING VISIT:  New Medications Ordered This Visit   Medications   • Lurasidone HCl (Latuda) 20 MG tablet tablet     Sig: Take 1 tablet by mouth Daily.     Dispense:  28 tablet     Refill:  0   • hydrOXYzine (ATARAX) 25 MG tablet     Sig: Take 1 tablet by mouth 2 (Two) Times a Day As Needed for Anxiety.     Dispense:  60 tablet     Refill:  3       Return in about 4 months (around 3/5/2022).         This document has been electronically signed by Jahaira Jenkins MD  November 5, 2021 10:58  EDT    EMR Dragon transcription disclaimer:  Some of this encounter note is an electronic transcription translation of spoken language to printed text. The electronic translation of spoken language may permit erroneous, or at times, nonsensical words or phrases to be inadvertently transcribed; Although I have reviewed the note for such errors some may still exist.

## 2021-11-05 NOTE — PATIENT INSTRUCTIONS
Bipolar 2 Disorder  Bipolar 2 disorder is a mental health disorder in which a person has episodes of emotional highs and episodes of emotional lows, or depression. In bipolar 2 disorder, the episodes of emotional highs are less extreme and do not last as long as in bipolar 1 disorder. These highs are called hypomania.  People with bipolar 2 disorder have had at least one episode of hypomania (hypomanic episode) in their lives, which is usually followed by a depressive episode. Some people may have cycles of hypomanic and depressive episodes. Some people with bipolar 2 disorder may lead a very normal life between episodes.  What are the causes?  The cause of this condition is not known.  What increases the risk?  The following factors may make you more likely to develop this condition:  · Having a family member with the disorder.  · Having an imbalance of certain chemicals in the brain (neurotransmitters).  · Experiencing stress, such as illness, divorce, financial problems, or a death.  · Having certain conditions that affect the brain or spinal cord (neurologic conditions).  · Having had a brain injury (trauma).  What are the signs or symptoms?  Symptoms of hypomania include:  · Very high self-esteem or self-confidence.  · Decreased need for sleep.  · Unusual talkativeness. Speech may be very fast.  · Racing thoughts, with quick shifts between topics that may or may not be related (flight of ideas).  · Change in ability to concentrate. Some people may have better focus, and others may not be able to focus at all.  · Increased agitation. This could be pacing, squirming, fidgeting, or finger and toe tapping.  · Impulsive behavior and poor judgment. This may result in high-risk activities, such as:  ? Being sexual with people you normally wouldn't be sexual with.  ? Spending money you have borrowed on things you don't need.  Symptoms of depression include:  · Extreme degrees of sadness, uncontrollable crying,  hopelessness, worthlessness, or numbness.  · Sleep problems, such as insomnia, waking early, or sleeping too much.  · No longer enjoying things you used to enjoy.  · Isolation. You may often spend time alone.  · Lack of energy or moving more slowly than normal.  · Trouble making decisions.  · Changes in appetite, such as eating too much or not eating.  · Thoughts of death, or wanting to harm yourself.  Sometimes, you may have a mix of symptoms of hypomania and depression at the same time. Stress can often trigger these symptoms.  How is this diagnosed?  This condition may be diagnosed based on:  · Emotional episodes.  · Medical history.  · Use of alcohol, drugs, and prescription medicines. Certain medical conditions and substances can cause symptoms that seem like bipolar disorder. This is called secondary bipolar disorder.  Your health care provider may ask you to take a short test. This helps to understand your symptoms. You may also be asked to see a mental health specialist for further evaluation or to start treatment.  How is this treated?         This condition is a long-term (chronic) illness. It is often managed with ongoing treatment rather than treatment only when symptoms occur. A combination of treatments is the main approach. Treatment may include:  · Psychotherapy. Some forms of talk therapy, such as cognitive behavioral therapy (CBT) and family therapy, can help with learning to manage bipolar disorder.  · Psychoeducation. This helps you and others understand how this disorder is managed. Include friends and family in educational sessions so they learn how best to support you.  · Methods of managing your condition, such as journaling or relaxation exercises. Relaxation exercises include:  ? Yoga.  ? Meditation.  ? Deep breathing.  · Lifestyle changes, such as:  ? Limiting alcohol and drug use.  ? Exercising regularly.  ? Structuring when you go to bed and when you get up.  ? Eating a healthy  diet.  · Medicine. Medicine can be prescribed by a health care provider who specializes in treating mental health disorders (psychiatrist). Medicines called mood stabilizers are usually prescribed. If symptoms occur during treatment with a mood stabilizer, other medicines may be added.  Follow these instructions at home:  Activity  · Return to your normal activities as told by your health care provider.  · Find activities that you enjoy, and make time to do them.  · Exercise regularly as told by your health care provider.  Lifestyle    · Follow a set daily schedule.  · Eat a healthy diet that includes fresh fruits and vegetables, whole grains, low-fat dairy, and lean meat.  · Get at least 7-8 hours of sleep each night.  · Avoid using products that contain nicotine or tobacco. If you want help quitting, ask your health care provider.  · Do not use drugs.    Alcohol use  · Do not drink alcohol if:  ? Your health care provider tells you not to drink.  ? You are pregnant, may be pregnant, or are planning to become pregnant.  · If you drink alcohol:  ? Limit how much you use to:  § 0-1 drink a day for women.  § 0-2 drinks a day for men.  ? Be aware of how much alcohol is in your drink. In the [place] one drink equals one 12 oz bottle of beer (355 mL), one 5 oz glass of wine (148 mL), or one 1½ oz glass of hard liquor (44 mL).  General instructions  · Take over-the-counter and prescription medicines only as told by your health care provider. You may think about stopping your medicine, but it is very important to take your medicine as prescribed.  · Consider joining a support group. Your health care provider may be able to recommend one.  · Talk with your family and friends about your treatment goals and how they can help.  · Keep all follow-up visits as told by your health care provider. This is important.  Where to find more information  · National [place] on Mental Illness: www.[place].org  · National [place] of Mental  Health: www.[facility].gov  Contact a health care provider if:  · Your symptoms get worse, or your loved ones tell you that your symptoms are getting worse.  · You have uncomfortable side effects from your medicine.  · You have trouble sleeping.  · You have trouble doing daily activities.  · You feel unsafe in your surroundings.  · You are self-medicating with alcohol or drugs.  Get help right away if:  · You have new symptoms.  · You have thoughts about harming yourself or others.  · You are considering suicide.  If you ever feel like you may hurt yourself or others, or have thoughts about taking your own life, get help right away. You can go to your nearest emergency department or call:  · Your local emergency services (911 in the [place]  · A suicide crisis helpline, such as the National Suicide Prevention Lifeline at 1-998.738.9627. This is open 24 hours a day.  Summary  · Bipolar 2 disorder is a lifelong mental health disorder in which a person has episodes of hypomania and depression.  · This disorder is mainly treated with a combination of talk therapy, education, strategies for managing the condition, and medicines.  · Talk with your family and friends about your treatment goals and how they can help.  · Get help right away if you are considering suicide.  This information is not intended to replace advice given to you by your health care provider. Make sure you discuss any questions you have with your health care provider.  Document Revised: 06/02/2020 Document Reviewed: 06/02/2020  [employer] Patient Education © 2021 [employer]

## 2021-12-06 DIAGNOSIS — E55.9 VITAMIN D DEFICIENCY: ICD-10-CM

## 2021-12-06 RX ORDER — ERGOCALCIFEROL 1.25 MG/1
CAPSULE ORAL
Qty: 12 CAPSULE | Refills: 1 | Status: SHIPPED | OUTPATIENT
Start: 2021-12-06 | End: 2022-04-15 | Stop reason: SDUPTHER

## 2022-03-04 ENCOUNTER — OFFICE VISIT (OUTPATIENT)
Dept: PSYCHIATRY | Facility: CLINIC | Age: 42
End: 2022-03-04

## 2022-03-04 DIAGNOSIS — F41.1 GENERALIZED ANXIETY DISORDER: Chronic | ICD-10-CM

## 2022-03-04 DIAGNOSIS — F31.4 BIPOLAR 1 DISORDER, DEPRESSED, SEVERE: Primary | Chronic | ICD-10-CM

## 2022-03-04 PROCEDURE — 99214 OFFICE O/P EST MOD 30 MIN: CPT | Performed by: PSYCHIATRY & NEUROLOGY

## 2022-03-04 RX ORDER — RISPERIDONE 0.5 MG/1
0.5 TABLET ORAL NIGHTLY
Qty: 30 TABLET | Refills: 3 | Status: SHIPPED | OUTPATIENT
Start: 2022-03-04 | End: 2022-04-15

## 2022-03-04 RX ORDER — HYDROXYZINE HYDROCHLORIDE 10 MG/1
10 TABLET, FILM COATED ORAL 3 TIMES DAILY PRN
Qty: 90 TABLET | Refills: 2 | Status: SHIPPED | OUTPATIENT
Start: 2022-03-04 | End: 2022-10-07

## 2022-03-04 NOTE — PROGRESS NOTES
Subjective   Chivo Billy is a 42 y.o. male who presents today for follow up    Chief Complaint:   anxiety      History of Present Illness: the pt suffered from depression for the last 2 years after his father passed away, feels depressed all the time, has his moments, he had incident with his wife , he sent angry messages to his wife because she would not let him see his daughter , now has legal problems  The pt c/o anger issues, increased irritability , increased E for 2-3 days  He had transient visual hallucinations when he was depressed after his father passed away   Anxiety - can not stop worrying about everything   Sleep - hard to fall asleep and staying asleep, almost al the time, no connection with mood changes, 3-4 hrs of sleep total on average       Today the reported feeling anxious, did not tolerate latuda, stated he was feeling more anxious, restless     Mood fluctuates depending on the situation   Anxiety - still intense, interfere with his job   Sleep - some nights when he can not sleep     The following portions of the patient's history were reviewed and updated as appropriate: allergies, current medications, past family history, past medical history, past social history, past surgical history and problem list.    PAST PSYCHIATRIC HISTORY  Axis I  Affective/Bipolar Disorder, Anxiety/Panic Disorder, no inpt,   No SAs   Axis II  Defer     PAST OUTPATIENT TREATMENT  Diagnosis treated:  Affective Disorder, Anxiety/Panic Disorder  Treatment Type:  Psychotherapy, Medication Management  Prior Psychiatric Medications:  vraylar - 3 mg - restlessness   lexapro - more irritable   wellbutrin - made more depressed   depakote - weight gain and sex side effects  abilify -made more anxious    latuda - restlessness     Support Groups:  Not now, but will start anger management groups on Feb 23   Sequelae Of Mental Disorder:  job disruption, family disruption, emotional distress          Interval History  No changes      Side Effects   akathisia on vraylar   More anxious on abilify   Weight gain - depakote   Restless on latuda     Past Medical History:  Past Medical History:   Diagnosis Date   • Depression    • Hyperlipidemia        Social History:  Social History     Socioeconomic History   • Marital status: Single   Tobacco Use   • Smoking status: Former Smoker     Packs/day: 1.50     Types: Cigarettes     Quit date: 10/1/2021     Years since quittin.4   • Smokeless tobacco: Never Used   Vaping Use   • Vaping Use: Never used   Substance and Sexual Activity   • Alcohol use: Yes     Comment: socially   • Drug use: Not Currently     Frequency: 7.0 times per week     Types: Marijuana   • Sexual activity: Defer      2 children , has a custody of 1  18 yo son, they live together     Family History:  Family History   Problem Relation Age of Onset   • Psoriasis Father    • Heart disease Maternal Grandmother    • Heart disease Paternal Grandmother    • Diabetes Paternal Grandmother    • Alzheimer's disease Paternal Grandmother    • Hypertension Brother    • Bipolar disorder Mother        Past Surgical History:  Past Surgical History:   Procedure Laterality Date   • HERNIA REPAIR         Problem List:  Patient Active Problem List   Diagnosis   • Bipolar 1 disorder, depressed, severe (HCC)   • Generalized anxiety disorder       Allergy:   No Known Allergies     Discontinued Medications:  Medications Discontinued During This Encounter   Medication Reason   • Lurasidone HCl (Latuda) 20 MG tablet tablet Side effects   • hydrOXYzine (ATARAX) 25 MG tablet        Current Medications:   Current Outpatient Medications   Medication Sig Dispense Refill   • atorvastatin (LIPITOR) 10 MG tablet Take 1 tablet by mouth Daily. 90 tablet 1   • hydrOXYzine (ATARAX) 10 MG tablet Take 1 tablet by mouth 3 (Three) Times a Day As Needed for Anxiety. 90 tablet 2   • lisinopril (PRINIVIL,ZESTRIL) 5 MG tablet Take 1 tablet by mouth Daily. 90 tablet 3    • risperiDONE (risperDAL) 0.5 MG tablet Take 1 tablet by mouth Every Night. 30 tablet 3   • vitamin D (ERGOCALCIFEROL) 1.25 MG (99715 UT) capsule capsule TAKE 1 CAPSULE BY MOUTH 1 TIME EVERY WEEK 12 capsule 1     No current facility-administered medications for this visit.         Psychological ROS: positive for - anxiety, irritability  negative for - concentration difficulties, depression, hallucinations, memory difficulties, mood swings or suicidal ideation      Physical Exam:   There were no vitals taken for this visit.    Mental Status Exam:   Hygiene:   good  Cooperation:  Cooperative  Eye Contact:  Fair  Psychomotor Behavior:  Appropriate  Affect:  Appropriate and Blunted  Mood: fluctates  Hopelessness: Denies  Speech:  Normal  Thought Process:  Goal directed and Linear  Thought Content:  Mood congruent  Suicidal:  None  Homicidal:  None  Hallucinations:  not now   Delusion:  None  Memory:  Intact  Orientation:  Person, Place, Time and Situation  Reliability:  fair  Insight:  Fair  Judgement:  Fair  Impulse Control:  limited   Physical/Medical Issues:  No      MSE from 11/5/2021 reviewed and accepted with changes     PHQ-9 Depression Screening  Little interest or pleasure in doing things? 1   Feeling down, depressed, or hopeless? 1   Trouble falling or staying asleep, or sleeping too much? 0   Feeling tired or having little energy? 1   Poor appetite or overeating? 0   Feeling bad about yourself - or that you are a failure or have let yourself or your family down? 2   Trouble concentrating on things, such as reading the newspaper or watching television? 1   Moving or speaking so slowly that other people could have noticed? Or the opposite - being so fidgety or restless that you have been moving around a lot more than usual?     Thoughts that you would be better off dead, or of hurting yourself in some way? 0   PHQ-9 Total Score 6   If you checked off any problems, how difficult have these problems made it for  you to do your work, take care of things at home, or get along with other people? Very difficult           Current every day smoker 3-10 mintues spent counseling Has reduced [place] use    I advised Chivo of the risks of tobacco use.     Lab Results:   No visits with results within 3 Month(s) from this visit.   Latest known visit with results is:   Lab on 10/15/2021   Component Date Value Ref Range Status   • Glucose 10/15/2021 89  65 - 99 mg/dL Final   • BUN 10/15/2021 16  6 - 20 mg/dL Final   • Creatinine 10/15/2021 1.02  0.76 - 1.27 mg/dL Final   • Sodium 10/15/2021 140  136 - 145 mmol/L Final   • Potassium 10/15/2021 4.0  3.5 - 5.2 mmol/L Final   • Chloride 10/15/2021 105  98 - 107 mmol/L Final   • CO2 10/15/2021 24.9  22.0 - 29.0 mmol/L Final   • Calcium 10/15/2021 9.1  8.6 - 10.5 mg/dL Final   • Total Protein 10/15/2021 7.0  6.0 - 8.5 g/dL Final   • Albumin 10/15/2021 4.60  3.50 - 5.20 g/dL Final   • ALT (SGPT) 10/15/2021 33  1 - 41 U/L Final   • AST (SGOT) 10/15/2021 16  1 - 40 U/L Final   • Alkaline Phosphatase 10/15/2021 68  39 - 117 U/L Final   • Total Bilirubin 10/15/2021 0.7  0.0 - 1.2 mg/dL Final   • eGFR Non  Amer 10/15/2021 81  >60 mL/min/1.73 Final   • Globulin 10/15/2021 2.4  gm/dL Final   • A/G Ratio 10/15/2021 1.9  g/dL Final   • BUN/Creatinine Ratio 10/15/2021 15.7  7.0 - 25.0 Final   • Anion Gap 10/15/2021 10.1  5.0 - 15.0 mmol/L Final   • Total Cholesterol 10/15/2021 249* 0 - 200 mg/dL Final   • Triglycerides 10/15/2021 152* 0 - 150 mg/dL Final   • HDL Cholesterol 10/15/2021 33* 40 - 60 mg/dL Final   • LDL Cholesterol  10/15/2021 188* 0 - 100 mg/dL Final   • VLDL Cholesterol 10/15/2021 28  5 - 40 mg/dL Final   • LDL/HDL Ratio 10/15/2021 5.62   Final   • 25 Hydroxy, Vitamin D 10/15/2021 39.1  ng/ml Final       Assessment/Plan   Problems Addressed this Visit        Mental Health    Bipolar 1 disorder, depressed, severe (HCC) - Primary (Chronic)    Relevant Medications    hydrOXYzine  (ATARAX) 10 MG tablet    risperiDONE (risperDAL) 0.5 MG tablet    Generalized anxiety disorder (Chronic)    Relevant Medications    hydrOXYzine (ATARAX) 10 MG tablet    risperiDONE (risperDAL) 0.5 MG tablet      Diagnoses       Codes Comments    Bipolar 1 disorder, depressed, severe (HCC)    -  Primary ICD-10-CM: F31.4  ICD-9-CM: 296.53     Generalized anxiety disorder     ICD-10-CM: F41.1  ICD-9-CM: 300.02           Visit Diagnoses:    ICD-10-CM ICD-9-CM   1. Bipolar 1 disorder, depressed, severe (HCC)  F31.4 296.53   2. Generalized anxiety disorder  F41.1 300.02       TREATMENT PLAN/GOALS: Continue supportive psychotherapy efforts and medications as indicated. Treatment and medication options discussed during today's visit. Patient ackowledged and verbally consented to continue with current treatment plan and was educated on the importance of compliance with treatment and follow-up appointments.    MEDICATION ISSUES:  1. Bipolar d/o moderate, depressed - d/c  latuda 20 mg - pt reported side effects, start risperidone 0.5 g po QHS     2. OTIS - hydroxyzine, change to 10 mg TID  , the pt stated 25 mg was too sedating , difficult to work       INSPECT reviewed as expected - no controlled meds     PHQ 6  - mild depression   OTIS 7 scored 14     Discussed medication options and treatment plan of prescribed medication as well as the risks, benefits, and side effects including potential falls, possible impaired driving and metabolic adversities among others. Patient is agreeable to call the office with any worsening of symptoms or onset of side effects. Patient is agreeable to call 911 or go to the nearest ER should he/she begin having SI/HI. No medication side effects or related complaints today.     MEDS ORDERED DURING VISIT:  New Medications Ordered This Visit   Medications   • hydrOXYzine (ATARAX) 10 MG tablet     Sig: Take 1 tablet by mouth 3 (Three) Times a Day As Needed for Anxiety.     Dispense:  90 tablet      Refill:  2   • risperiDONE (risperDAL) 0.5 MG tablet     Sig: Take 1 tablet by mouth Every Night.     Dispense:  30 tablet     Refill:  3       Return in about 4 months (around 7/4/2022).         This document has been electronically signed by Jahaira Jenkins MD  March 4, 2022 11:25 EST    EMR Dragon transcription disclaimer:  Some of this encounter note is an electronic transcription translation of spoken language to printed text. The electronic translation of spoken language may permit erroneous, or at times, nonsensical words or phrases to be inadvertently transcribed; Although I have reviewed the note for such errors some may still exist.

## 2022-04-14 NOTE — PROGRESS NOTES
Subjective   Chivo Billy is a 42 y.o. male.       HPI   Pt. is here today for follow up on medication.    1) HTN - Currently on lisinopril 5 mg daily.   Denies any CP; palpitations; SOA; dizziness; headache; trouble with vision.    2) Hyperlipidemia - currently on atorvastatin 10 mg daily.     3) Vitamin D def - currently on weekly 50,000 IU supplement.     The following portions of the patient's history were reviewed and updated as appropriate: allergies, current medications, past family history, past medical history, past social history, past surgical history and problem list.    Review of Systems   Constitutional: Negative for chills, fatigue and fever.   Eyes: Negative for visual disturbance.   Respiratory: Negative for cough, chest tightness, shortness of breath and wheezing.    Cardiovascular: Negative for chest pain, palpitations and leg swelling.   Gastrointestinal: Negative for constipation, diarrhea, nausea and vomiting.   Endocrine: Negative for polydipsia, polyphagia and polyuria.   Genitourinary: Negative for dysuria, frequency, hematuria and urgency.   Musculoskeletal: Negative for arthralgias and myalgias.   Skin: Negative for rash.   Neurological: Negative for dizziness, weakness and headache.   Psychiatric/Behavioral: Negative for depressed mood. The patient is not nervous/anxious.        Objective   Physical Exam  Vitals reviewed.   Constitutional:       General: He is not in acute distress.     Appearance: Normal appearance.   Cardiovascular:      Rate and Rhythm: Normal rate and regular rhythm.      Pulses: Normal pulses.      Heart sounds: Normal heart sounds. No murmur heard.  Pulmonary:      Effort: Pulmonary effort is normal. No respiratory distress.      Breath sounds: Normal breath sounds. No wheezing.   Chest:      Chest wall: No tenderness.   Abdominal:      Tenderness: There is no right CVA tenderness or left CVA tenderness.   Musculoskeletal:      Right lower leg: No edema.      Left  lower leg: No edema.   Skin:     General: Skin is warm and dry.      Findings: No erythema.   Neurological:      General: No focal deficit present.      Mental Status: He is alert and oriented to person, place, and time.   Psychiatric:         Mood and Affect: Mood normal.           Assessment/Plan   Diagnoses and all orders for this visit:    1. Essential hypertension (Primary)  Comments:  Stable.   Cont. current medication.   Labs ordered.   RTO in 6 mo.   Work on healthy diet; exercise at least 150 minutes weekly; weight loss.   Orders:  -     Comprehensive metabolic panel; Future  -     Lipid panel; Future  -     Hepatitis C antibody; Future  -     lisinopril (PRINIVIL,ZESTRIL) 5 MG tablet; Take 1 tablet by mouth Daily.  Dispense: 90 tablet; Refill: 1    2. Mixed hyperlipidemia  Comments:  Stable.   Cont. current medication.   Labs ordered.   RTO in 6 mo.   Orders:  -     Comprehensive metabolic panel; Future  -     Lipid panel; Future  -     Hepatitis C antibody; Future  -     atorvastatin (LIPITOR) 10 MG tablet; Take 1 tablet by mouth Daily.  Dispense: 90 tablet; Refill: 1    3. Vitamin D deficiency  Comments:  Stable.   Cont. current medication.   Labs ordered.   RTO in 6 mo.   Orders:  -     Vitamin D 25 hydroxy; Future  -     vitamin D (ERGOCALCIFEROL) 1.25 MG (23337 UT) capsule capsule; Take 1 capsule by mouth 1 (One) Time Per Week.  Dispense: 12 capsule; Refill: 1    4. Immunization due  -     Tdap Vaccine Greater Than or Equal To 6yo IM

## 2022-04-15 ENCOUNTER — OFFICE VISIT (OUTPATIENT)
Dept: FAMILY MEDICINE CLINIC | Facility: CLINIC | Age: 42
End: 2022-04-15

## 2022-04-15 VITALS
DIASTOLIC BLOOD PRESSURE: 91 MMHG | HEART RATE: 79 BPM | SYSTOLIC BLOOD PRESSURE: 126 MMHG | OXYGEN SATURATION: 98 % | HEIGHT: 70 IN | BODY MASS INDEX: 32.21 KG/M2 | WEIGHT: 225 LBS

## 2022-04-15 DIAGNOSIS — I10 ESSENTIAL HYPERTENSION: Primary | ICD-10-CM

## 2022-04-15 DIAGNOSIS — E78.2 MIXED HYPERLIPIDEMIA: ICD-10-CM

## 2022-04-15 DIAGNOSIS — E55.9 VITAMIN D DEFICIENCY: ICD-10-CM

## 2022-04-15 DIAGNOSIS — Z23 IMMUNIZATION DUE: ICD-10-CM

## 2022-04-15 PROCEDURE — 99214 OFFICE O/P EST MOD 30 MIN: CPT | Performed by: NURSE PRACTITIONER

## 2022-04-15 PROCEDURE — 90715 TDAP VACCINE 7 YRS/> IM: CPT | Performed by: NURSE PRACTITIONER

## 2022-04-15 PROCEDURE — 90471 IMMUNIZATION ADMIN: CPT | Performed by: NURSE PRACTITIONER

## 2022-04-15 RX ORDER — ATORVASTATIN CALCIUM 10 MG/1
10 TABLET, FILM COATED ORAL DAILY
Qty: 90 TABLET | Refills: 1 | Status: SHIPPED | OUTPATIENT
Start: 2022-04-15 | End: 2022-04-25

## 2022-04-15 RX ORDER — LISINOPRIL 5 MG/1
5 TABLET ORAL DAILY
Qty: 90 TABLET | Refills: 1 | Status: SHIPPED | OUTPATIENT
Start: 2022-04-15 | End: 2022-10-07

## 2022-04-15 RX ORDER — ERGOCALCIFEROL 1.25 MG/1
50000 CAPSULE ORAL WEEKLY
Qty: 12 CAPSULE | Refills: 1 | Status: SHIPPED | OUTPATIENT
Start: 2022-04-15 | End: 2022-10-07

## 2022-04-25 DIAGNOSIS — E78.2 MIXED HYPERLIPIDEMIA: ICD-10-CM

## 2022-04-25 RX ORDER — ATORVASTATIN CALCIUM 10 MG/1
10 TABLET, FILM COATED ORAL DAILY
Qty: 90 TABLET | Refills: 1 | Status: SHIPPED | OUTPATIENT
Start: 2022-04-25 | End: 2022-10-07

## 2022-05-23 DIAGNOSIS — E55.9 VITAMIN D DEFICIENCY: ICD-10-CM

## 2022-05-23 RX ORDER — ERGOCALCIFEROL 1.25 MG/1
CAPSULE ORAL
Qty: 12 CAPSULE | Refills: 1 | OUTPATIENT
Start: 2022-05-23

## 2022-09-21 PROCEDURE — 99284 EMERGENCY DEPT VISIT MOD MDM: CPT

## 2022-09-22 ENCOUNTER — READMISSION MANAGEMENT (OUTPATIENT)
Dept: CALL CENTER | Facility: HOSPITAL | Age: 42
End: 2022-09-22

## 2022-09-22 ENCOUNTER — ANESTHESIA EVENT (OUTPATIENT)
Dept: GASTROENTEROLOGY | Facility: HOSPITAL | Age: 42
End: 2022-09-22

## 2022-09-22 ENCOUNTER — ANESTHESIA (OUTPATIENT)
Dept: GASTROENTEROLOGY | Facility: HOSPITAL | Age: 42
End: 2022-09-22

## 2022-09-22 ENCOUNTER — APPOINTMENT (OUTPATIENT)
Dept: CT IMAGING | Facility: HOSPITAL | Age: 42
End: 2022-09-22

## 2022-09-22 ENCOUNTER — ON CAMPUS - OUTPATIENT (OUTPATIENT)
Dept: URBAN - METROPOLITAN AREA HOSPITAL 85 | Facility: HOSPITAL | Age: 42
End: 2022-09-22

## 2022-09-22 ENCOUNTER — HOSPITAL ENCOUNTER (OUTPATIENT)
Facility: HOSPITAL | Age: 42
Discharge: HOME | End: 2022-09-22
Attending: EMERGENCY MEDICINE | Admitting: INTERNAL MEDICINE

## 2022-09-22 VITALS
TEMPERATURE: 98.2 F | BODY MASS INDEX: 30.74 KG/M2 | HEIGHT: 68 IN | HEART RATE: 61 BPM | SYSTOLIC BLOOD PRESSURE: 136 MMHG | OXYGEN SATURATION: 99 % | WEIGHT: 202.8 LBS | DIASTOLIC BLOOD PRESSURE: 84 MMHG | RESPIRATION RATE: 18 BRPM

## 2022-09-22 DIAGNOSIS — K29.50 UNSPECIFIED CHRONIC GASTRITIS WITHOUT BLEEDING: ICD-10-CM

## 2022-09-22 DIAGNOSIS — N39.0 URINARY TRACT INFECTION WITHOUT HEMATURIA, SITE UNSPECIFIED: ICD-10-CM

## 2022-09-22 DIAGNOSIS — R11.15 PERSISTENT VOMITING: Primary | ICD-10-CM

## 2022-09-22 DIAGNOSIS — R10.84 GENERALIZED ABDOMINAL PAIN: ICD-10-CM

## 2022-09-22 DIAGNOSIS — R11.15 CYCLICAL VOMITING SYNDROME UNRELATED TO MIGRAINE: ICD-10-CM

## 2022-09-22 DIAGNOSIS — K44.9 DIAPHRAGMATIC HERNIA WITHOUT OBSTRUCTION OR GANGRENE: ICD-10-CM

## 2022-09-22 LAB
ALBUMIN SERPL-MCNC: 4.4 G/DL (ref 3.5–5.2)
ALBUMIN/GLOB SERPL: 1.5 G/DL
ALP SERPL-CCNC: 76 U/L (ref 39–117)
ALT SERPL W P-5'-P-CCNC: 19 U/L (ref 1–41)
ANION GAP SERPL CALCULATED.3IONS-SCNC: 13 MMOL/L (ref 5–15)
ANION GAP SERPL CALCULATED.3IONS-SCNC: 7 MMOL/L (ref 5–15)
AST SERPL-CCNC: 16 U/L (ref 1–40)
BACTERIA UR QL AUTO: ABNORMAL /HPF
BASOPHILS # BLD AUTO: 0.1 10*3/MM3 (ref 0–0.2)
BASOPHILS # BLD AUTO: 0.2 10*3/MM3 (ref 0–0.2)
BASOPHILS NFR BLD AUTO: 0.6 % (ref 0–1.5)
BASOPHILS NFR BLD AUTO: 1 % (ref 0–1.5)
BILIRUB SERPL-MCNC: 0.7 MG/DL (ref 0–1.2)
BILIRUB UR QL STRIP: ABNORMAL
BUN SERPL-MCNC: 11 MG/DL (ref 6–20)
BUN SERPL-MCNC: 11 MG/DL (ref 6–20)
BUN/CREAT SERPL: 10.3 (ref 7–25)
BUN/CREAT SERPL: 11.1 (ref 7–25)
CALCIUM SPEC-SCNC: 8.5 MG/DL (ref 8.6–10.5)
CALCIUM SPEC-SCNC: 9.1 MG/DL (ref 8.6–10.5)
CHLORIDE SERPL-SCNC: 101 MMOL/L (ref 98–107)
CHLORIDE SERPL-SCNC: 106 MMOL/L (ref 98–107)
CLARITY UR: CLEAR
CO2 SERPL-SCNC: 25 MMOL/L (ref 22–29)
CO2 SERPL-SCNC: 28 MMOL/L (ref 22–29)
COLOR UR: ABNORMAL
CREAT SERPL-MCNC: 0.99 MG/DL (ref 0.76–1.27)
CREAT SERPL-MCNC: 1.07 MG/DL (ref 0.76–1.27)
DEPRECATED RDW RBC AUTO: 42 FL (ref 37–54)
DEPRECATED RDW RBC AUTO: 42.9 FL (ref 37–54)
EGFRCR SERPLBLD CKD-EPI 2021: 90 ML/MIN/1.73
EGFRCR SERPLBLD CKD-EPI 2021: 98.8 ML/MIN/1.73
EOSINOPHIL # BLD AUTO: 0 10*3/MM3 (ref 0–0.4)
EOSINOPHIL # BLD AUTO: 0 10*3/MM3 (ref 0–0.4)
EOSINOPHIL NFR BLD AUTO: 0.1 % (ref 0.3–6.2)
EOSINOPHIL NFR BLD AUTO: 0.4 % (ref 0.3–6.2)
ERYTHROCYTE [DISTWIDTH] IN BLOOD BY AUTOMATED COUNT: 13.2 % (ref 12.3–15.4)
ERYTHROCYTE [DISTWIDTH] IN BLOOD BY AUTOMATED COUNT: 13.3 % (ref 12.3–15.4)
ERYTHROCYTE [SEDIMENTATION RATE] IN BLOOD: 15 MM/HR (ref 0–15)
GLOBULIN UR ELPH-MCNC: 2.9 GM/DL
GLUCOSE SERPL-MCNC: 103 MG/DL (ref 65–99)
GLUCOSE SERPL-MCNC: 105 MG/DL (ref 65–99)
GLUCOSE UR STRIP-MCNC: NEGATIVE MG/DL
HCT VFR BLD AUTO: 42.9 % (ref 37.5–51)
HCT VFR BLD AUTO: 48.5 % (ref 37.5–51)
HGB BLD-MCNC: 14.5 G/DL (ref 13–17.7)
HGB BLD-MCNC: 16.5 G/DL (ref 13–17.7)
HGB UR QL STRIP.AUTO: NEGATIVE
HYALINE CASTS UR QL AUTO: ABNORMAL /LPF
KETONES UR QL STRIP: ABNORMAL
LEUKOCYTE ESTERASE UR QL STRIP.AUTO: ABNORMAL
LIPASE SERPL-CCNC: 23 U/L (ref 13–60)
LYMPHOCYTES # BLD AUTO: 2.2 10*3/MM3 (ref 0.7–3.1)
LYMPHOCYTES # BLD AUTO: 2.8 10*3/MM3 (ref 0.7–3.1)
LYMPHOCYTES NFR BLD AUTO: 16.5 % (ref 19.6–45.3)
LYMPHOCYTES NFR BLD AUTO: 26.6 % (ref 19.6–45.3)
MCH RBC QN AUTO: 30.9 PG (ref 26.6–33)
MCH RBC QN AUTO: 31 PG (ref 26.6–33)
MCHC RBC AUTO-ENTMCNC: 33.8 G/DL (ref 31.5–35.7)
MCHC RBC AUTO-ENTMCNC: 34 G/DL (ref 31.5–35.7)
MCV RBC AUTO: 90.9 FL (ref 79–97)
MCV RBC AUTO: 91.6 FL (ref 79–97)
MONOCYTES # BLD AUTO: 0.8 10*3/MM3 (ref 0.1–0.9)
MONOCYTES # BLD AUTO: 1.7 10*3/MM3 (ref 0.1–0.9)
MONOCYTES NFR BLD AUTO: 10 % (ref 5–12)
MONOCYTES NFR BLD AUTO: 9.9 % (ref 5–12)
NEUTROPHILS NFR BLD AUTO: 12.3 10*3/MM3 (ref 1.7–7)
NEUTROPHILS NFR BLD AUTO: 5.1 10*3/MM3 (ref 1.7–7)
NEUTROPHILS NFR BLD AUTO: 62.4 % (ref 42.7–76)
NEUTROPHILS NFR BLD AUTO: 72.5 % (ref 42.7–76)
NITRITE UR QL STRIP: NEGATIVE
NRBC BLD AUTO-RTO: 0 /100 WBC (ref 0–0.2)
NRBC BLD AUTO-RTO: 0.1 /100 WBC (ref 0–0.2)
PH UR STRIP.AUTO: 5.5 [PH] (ref 5–8)
PLATELET # BLD AUTO: 250 10*3/MM3 (ref 140–450)
PLATELET # BLD AUTO: 322 10*3/MM3 (ref 140–450)
PMV BLD AUTO: 6.7 FL (ref 6–12)
PMV BLD AUTO: 6.9 FL (ref 6–12)
POTASSIUM SERPL-SCNC: 3.6 MMOL/L (ref 3.5–5.2)
POTASSIUM SERPL-SCNC: 3.6 MMOL/L (ref 3.5–5.2)
PROCALCITONIN SERPL-MCNC: 0.03 NG/ML (ref 0–0.25)
PROT SERPL-MCNC: 7.3 G/DL (ref 6–8.5)
PROT UR QL STRIP: ABNORMAL
RBC # BLD AUTO: 4.68 10*6/MM3 (ref 4.14–5.8)
RBC # BLD AUTO: 5.34 10*6/MM3 (ref 4.14–5.8)
RBC # UR STRIP: ABNORMAL /HPF
REF LAB TEST METHOD: ABNORMAL
SODIUM SERPL-SCNC: 139 MMOL/L (ref 136–145)
SODIUM SERPL-SCNC: 141 MMOL/L (ref 136–145)
SP GR UR STRIP: 1.03 (ref 1–1.03)
SQUAMOUS #/AREA URNS HPF: ABNORMAL /HPF
UROBILINOGEN UR QL STRIP: ABNORMAL
WBC # UR STRIP: ABNORMAL /HPF
WBC NRBC COR # BLD: 17 10*3/MM3 (ref 3.4–10.8)
WBC NRBC COR # BLD: 8.2 10*3/MM3 (ref 3.4–10.8)

## 2022-09-22 PROCEDURE — G0378 HOSPITAL OBSERVATION PER HR: HCPCS

## 2022-09-22 PROCEDURE — 96365 THER/PROPH/DIAG IV INF INIT: CPT

## 2022-09-22 PROCEDURE — 25010000002 CEFTRIAXONE PER 250 MG: Performed by: EMERGENCY MEDICINE

## 2022-09-22 PROCEDURE — 83690 ASSAY OF LIPASE: CPT | Performed by: EMERGENCY MEDICINE

## 2022-09-22 PROCEDURE — 96375 TX/PRO/DX INJ NEW DRUG ADDON: CPT

## 2022-09-22 PROCEDURE — 36415 COLL VENOUS BLD VENIPUNCTURE: CPT | Performed by: EMERGENCY MEDICINE

## 2022-09-22 PROCEDURE — 25010000002 METOCLOPRAMIDE PER 10 MG: Performed by: NURSE PRACTITIONER

## 2022-09-22 PROCEDURE — 80053 COMPREHEN METABOLIC PANEL: CPT | Performed by: EMERGENCY MEDICINE

## 2022-09-22 PROCEDURE — 25010000002 PROPOFOL 200 MG/20ML EMULSION: Performed by: ANESTHESIOLOGY

## 2022-09-22 PROCEDURE — 81001 URINALYSIS AUTO W/SCOPE: CPT | Performed by: EMERGENCY MEDICINE

## 2022-09-22 PROCEDURE — 85025 COMPLETE CBC W/AUTO DIFF WBC: CPT | Performed by: EMERGENCY MEDICINE

## 2022-09-22 PROCEDURE — 85652 RBC SED RATE AUTOMATED: CPT | Performed by: EMERGENCY MEDICINE

## 2022-09-22 PROCEDURE — 43239 EGD BIOPSY SINGLE/MULTIPLE: CPT | Performed by: INTERNAL MEDICINE

## 2022-09-22 PROCEDURE — 84145 PROCALCITONIN (PCT): CPT | Performed by: PHYSICIAN ASSISTANT

## 2022-09-22 PROCEDURE — 87086 URINE CULTURE/COLONY COUNT: CPT | Performed by: EMERGENCY MEDICINE

## 2022-09-22 PROCEDURE — 96376 TX/PRO/DX INJ SAME DRUG ADON: CPT

## 2022-09-22 PROCEDURE — 88305 TISSUE EXAM BY PATHOLOGIST: CPT | Performed by: INTERNAL MEDICINE

## 2022-09-22 PROCEDURE — 25010000002 ONDANSETRON PER 1 MG: Performed by: EMERGENCY MEDICINE

## 2022-09-22 PROCEDURE — 74176 CT ABD & PELVIS W/O CONTRAST: CPT

## 2022-09-22 RX ORDER — ACETAMINOPHEN 325 MG/1
650 TABLET ORAL EVERY 4 HOURS PRN
Status: DISCONTINUED | OUTPATIENT
Start: 2022-09-22 | End: 2022-09-22 | Stop reason: HOSPADM

## 2022-09-22 RX ORDER — SODIUM CHLORIDE 9 MG/ML
INJECTION, SOLUTION INTRAVENOUS CONTINUOUS PRN
Status: DISCONTINUED | OUTPATIENT
Start: 2022-09-22 | End: 2022-09-22 | Stop reason: SURG

## 2022-09-22 RX ORDER — SODIUM CHLORIDE 9 MG/ML
150 INJECTION, SOLUTION INTRAVENOUS CONTINUOUS
Status: DISCONTINUED | OUTPATIENT
Start: 2022-09-22 | End: 2022-09-22 | Stop reason: HOSPADM

## 2022-09-22 RX ORDER — METOCLOPRAMIDE HYDROCHLORIDE 5 MG/ML
10 INJECTION INTRAMUSCULAR; INTRAVENOUS EVERY 6 HOURS
Status: DISCONTINUED | OUTPATIENT
Start: 2022-09-22 | End: 2022-09-22 | Stop reason: HOSPADM

## 2022-09-22 RX ORDER — PANTOPRAZOLE SODIUM 40 MG/10ML
40 INJECTION, POWDER, LYOPHILIZED, FOR SOLUTION INTRAVENOUS
Status: DISCONTINUED | OUTPATIENT
Start: 2022-09-22 | End: 2022-09-22

## 2022-09-22 RX ORDER — ATORVASTATIN CALCIUM 10 MG/1
10 TABLET, FILM COATED ORAL NIGHTLY
Status: DISCONTINUED | OUTPATIENT
Start: 2022-09-22 | End: 2022-09-22 | Stop reason: HOSPADM

## 2022-09-22 RX ORDER — PANTOPRAZOLE SODIUM 40 MG/10ML
40 INJECTION, POWDER, LYOPHILIZED, FOR SOLUTION INTRAVENOUS
Status: DISCONTINUED | OUTPATIENT
Start: 2022-09-22 | End: 2022-09-22 | Stop reason: HOSPADM

## 2022-09-22 RX ORDER — SODIUM CHLORIDE 0.9 % (FLUSH) 0.9 %
10 SYRINGE (ML) INJECTION EVERY 12 HOURS SCHEDULED
Status: DISCONTINUED | OUTPATIENT
Start: 2022-09-22 | End: 2022-09-22 | Stop reason: HOSPADM

## 2022-09-22 RX ORDER — ONDANSETRON 2 MG/ML
4 INJECTION INTRAMUSCULAR; INTRAVENOUS ONCE
Status: COMPLETED | OUTPATIENT
Start: 2022-09-22 | End: 2022-09-22

## 2022-09-22 RX ORDER — CHOLECALCIFEROL (VITAMIN D3) 125 MCG
5 CAPSULE ORAL NIGHTLY PRN
Status: DISCONTINUED | OUTPATIENT
Start: 2022-09-22 | End: 2022-09-22 | Stop reason: HOSPADM

## 2022-09-22 RX ORDER — SODIUM CHLORIDE 0.9 % (FLUSH) 0.9 %
3-10 SYRINGE (ML) INJECTION AS NEEDED
Status: CANCELLED | OUTPATIENT
Start: 2022-09-22

## 2022-09-22 RX ORDER — SODIUM CHLORIDE 0.9 % (FLUSH) 0.9 %
3 SYRINGE (ML) INJECTION EVERY 12 HOURS SCHEDULED
Status: CANCELLED | OUTPATIENT
Start: 2022-09-22

## 2022-09-22 RX ORDER — NICOTINE 21 MG/24HR
1 PATCH, TRANSDERMAL 24 HOURS TRANSDERMAL
Status: DISCONTINUED | OUTPATIENT
Start: 2022-09-22 | End: 2022-09-22 | Stop reason: HOSPADM

## 2022-09-22 RX ORDER — SODIUM CHLORIDE 0.9 % (FLUSH) 0.9 %
10 SYRINGE (ML) INJECTION AS NEEDED
Status: DISCONTINUED | OUTPATIENT
Start: 2022-09-22 | End: 2022-09-22 | Stop reason: HOSPADM

## 2022-09-22 RX ORDER — PANTOPRAZOLE SODIUM 40 MG/1
40 TABLET, DELAYED RELEASE ORAL DAILY
Qty: 30 TABLET | Refills: 0 | Status: SHIPPED | OUTPATIENT
Start: 2022-09-22 | End: 2022-10-22

## 2022-09-22 RX ORDER — AMITRIPTYLINE HYDROCHLORIDE 25 MG/1
25 TABLET, FILM COATED ORAL NIGHTLY
Qty: 30 TABLET | Refills: 0 | Status: SHIPPED | OUTPATIENT
Start: 2022-09-22 | End: 2022-10-22

## 2022-09-22 RX ORDER — FAMOTIDINE 10 MG/ML
20 INJECTION, SOLUTION INTRAVENOUS ONCE
Status: COMPLETED | OUTPATIENT
Start: 2022-09-22 | End: 2022-09-22

## 2022-09-22 RX ORDER — AMITRIPTYLINE HYDROCHLORIDE 25 MG/1
25 TABLET, FILM COATED ORAL NIGHTLY
Status: DISCONTINUED | OUTPATIENT
Start: 2022-09-22 | End: 2022-09-22 | Stop reason: HOSPADM

## 2022-09-22 RX ORDER — ONDANSETRON 2 MG/ML
4 INJECTION INTRAMUSCULAR; INTRAVENOUS EVERY 6 HOURS PRN
Status: DISCONTINUED | OUTPATIENT
Start: 2022-09-22 | End: 2022-09-22 | Stop reason: HOSPADM

## 2022-09-22 RX ORDER — PROPOFOL 10 MG/ML
INJECTION, EMULSION INTRAVENOUS AS NEEDED
Status: DISCONTINUED | OUTPATIENT
Start: 2022-09-22 | End: 2022-09-22 | Stop reason: SURG

## 2022-09-22 RX ADMIN — ONDANSETRON 4 MG: 2 INJECTION INTRAMUSCULAR; INTRAVENOUS at 00:49

## 2022-09-22 RX ADMIN — CEFTRIAXONE 1 G: 1 INJECTION, POWDER, FOR SOLUTION INTRAMUSCULAR; INTRAVENOUS at 02:29

## 2022-09-22 RX ADMIN — NICOTINE 1 PATCH: 21 PATCH, EXTENDED RELEASE TRANSDERMAL at 08:25

## 2022-09-22 RX ADMIN — Medication 10 ML: at 02:48

## 2022-09-22 RX ADMIN — SODIUM CHLORIDE, POTASSIUM CHLORIDE, SODIUM LACTATE AND CALCIUM CHLORIDE 1000 ML: 600; 310; 30; 20 INJECTION, SOLUTION INTRAVENOUS at 00:49

## 2022-09-22 RX ADMIN — SODIUM CHLORIDE 150 ML/HR: 9 INJECTION, SOLUTION INTRAVENOUS at 02:48

## 2022-09-22 RX ADMIN — PROPOFOL 50 MG: 10 INJECTION, EMULSION INTRAVENOUS at 12:31

## 2022-09-22 RX ADMIN — SODIUM CHLORIDE: 0.9 INJECTION, SOLUTION INTRAVENOUS at 12:19

## 2022-09-22 RX ADMIN — ACETAMINOPHEN 650 MG: 325 TABLET, FILM COATED ORAL at 05:45

## 2022-09-22 RX ADMIN — METOCLOPRAMIDE HYDROCHLORIDE 10 MG: 5 INJECTION INTRAMUSCULAR; INTRAVENOUS at 10:20

## 2022-09-22 RX ADMIN — PROPOFOL 50 MG: 10 INJECTION, EMULSION INTRAVENOUS at 12:30

## 2022-09-22 RX ADMIN — Medication 10 ML: at 08:25

## 2022-09-22 RX ADMIN — PROPOFOL 150 MG: 10 INJECTION, EMULSION INTRAVENOUS at 12:28

## 2022-09-22 RX ADMIN — PANTOPRAZOLE SODIUM 40 MG: 40 INJECTION, POWDER, FOR SOLUTION INTRAVENOUS at 08:25

## 2022-09-22 RX ADMIN — FAMOTIDINE 20 MG: 10 INJECTION INTRAVENOUS at 02:47

## 2022-09-22 NOTE — ED PROVIDER NOTES
Subjective   History of Present Illness  Chief complaint vomiting diarrhea    History of present is a 42-year-old male who states he has had abdominal cramping nonstop vomiting diarrhea for last 24 hours and cannot hold anything down.  This moderate to severe worse whenever he tries to eat or drink anything that really makes it better.  No one at home with similar illness no foreign travels or antibiotic use no urinary problems.  No bleeding.  Patient states he did have some bloody stool last week but that went away.  He states it was bright red in nature.  The patient states he gets these spells intermittently every few months he will have vomiting and diarrhea but this goes away.  But this is the worst its been.  Has a lot of heartburn he states a lot of acid taste in his mouth.  No dysuria or frequency no penile discharge.  He has been at home.  No weight loss no night sweats.  No pain or swelling in the testicles.        Review of Systems   Constitutional: Negative for chills and fever.   HENT: Negative for congestion and sinus pressure.    Eyes: Negative for photophobia and visual disturbance.   Respiratory: Negative for chest tightness and shortness of breath.    Cardiovascular: Negative for chest pain and leg swelling.   Gastrointestinal: Positive for abdominal pain, blood in stool, diarrhea and vomiting.   Endocrine: Negative for cold intolerance and heat intolerance.   Genitourinary: Negative for difficulty urinating, dysuria and testicular pain.   Musculoskeletal: Negative for back pain and neck pain.   Skin: Negative for color change and rash.   Neurological: Negative for dizziness and light-headedness.   Psychiatric/Behavioral: Negative for agitation and behavioral problems.       Past Medical History:   Diagnosis Date   • Depression    • Hyperlipidemia        No Known Allergies    Past Surgical History:   Procedure Laterality Date   • HERNIA REPAIR         Family History   Problem Relation Age of Onset    • Psoriasis Father    • Heart disease Maternal Grandmother    • Heart disease Paternal Grandmother    • Diabetes Paternal Grandmother    • Alzheimer's disease Paternal Grandmother    • Hypertension Brother    • Bipolar disorder Mother        Social History     Socioeconomic History   • Marital status: Single   Tobacco Use   • Smoking status: Former Smoker     Packs/day: 1.50     Types: Cigarettes     Quit date: 10/1/2021     Years since quittin.9   • Smokeless tobacco: Never Used   Vaping Use   • Vaping Use: Never used   Substance and Sexual Activity   • Alcohol use: Yes     Comment: socially   • Drug use: Not Currently     Frequency: 7.0 times per week     Types: Marijuana   • Sexual activity: Defer     Prior to Admission medications    Medication Sig Start Date End Date Taking? Authorizing Provider   atorvastatin (LIPITOR) 10 MG tablet TAKE 1 TABLET BY MOUTH DAILY 22   Keisha Wilde APRN   hydrOXYzine (ATARAX) 10 MG tablet Take 1 tablet by mouth 3 (Three) Times a Day As Needed for Anxiety. 3/4/22   Jahaira Jenkins MD   lisinopril (PRINIVIL,ZESTRIL) 5 MG tablet Take 1 tablet by mouth Daily. 4/15/22   Keisha Wilde APRN   vitamin D (ERGOCALCIFEROL) 1.25 MG (85362 UT) capsule capsule Take 1 capsule by mouth 1 (One) Time Per Week. 4/15/22   Keisha Wilde APRN             Objective   Physical Exam  Constitutional is a 42-year-old male awake alert no acute distress temperature 99 the patient has blood pressure 122/87 heart rate 105 respiration 17 sats 97% on room air.  HEENT extraocular muscles are intact pupils equal round reactive no photophobia.  Mouth clear.  Neck supple no adenopathy no JVD no bruits no meningeal signs.  Back no CVA tenderness.  Heart regular without murmur.  Lungs clear no retractions.  Abdomen soft nontender nondistended good bowel sounds no peritoneal findings or other masses.  Extremities no edema cords or Homans' sign or evidence of DVT pulses are equal throughout  upper and lower extremities.  Skin warm and dry without rashes or cellulitic changes.  Neurologic awake alert orientated x3 no facial asymmetry normal speech without focal weakness  Procedures           ED Course  ED Course as of 09/22/22 0309   Thu Sep 22, 2022   0022 Seen after being placed in a room from triage [JW]      ED Course User Index  [JW] Pamela Allen APRN            Results for orders placed or performed during the hospital encounter of 09/22/22   Comprehensive Metabolic Panel    Specimen: Blood   Result Value Ref Range    Glucose 103 (H) 65 - 99 mg/dL    BUN 11 6 - 20 mg/dL    Creatinine 1.07 0.76 - 1.27 mg/dL    Sodium 139 136 - 145 mmol/L    Potassium 3.6 3.5 - 5.2 mmol/L    Chloride 101 98 - 107 mmol/L    CO2 25.0 22.0 - 29.0 mmol/L    Calcium 9.1 8.6 - 10.5 mg/dL    Total Protein 7.3 6.0 - 8.5 g/dL    Albumin 4.40 3.50 - 5.20 g/dL    ALT (SGPT) 19 1 - 41 U/L    AST (SGOT) 16 1 - 40 U/L    Alkaline Phosphatase 76 39 - 117 U/L    Total Bilirubin 0.7 0.0 - 1.2 mg/dL    Globulin 2.9 gm/dL    A/G Ratio 1.5 g/dL    BUN/Creatinine Ratio 10.3 7.0 - 25.0    Anion Gap 13.0 5.0 - 15.0 mmol/L    eGFR 90.0 >60.0 mL/min/1.73   Lipase    Specimen: Blood   Result Value Ref Range    Lipase 23 13 - 60 U/L   Urinalysis With Microscopic If Indicated (No Culture) - Urine, Clean Catch    Specimen: Urine, Clean Catch   Result Value Ref Range    Color, UA Dark Yellow (A) Yellow, Straw    Appearance, UA Clear Clear    pH, UA 5.5 5.0 - 8.0    Specific Gravity, UA 1.031 (H) 1.005 - 1.030    Glucose, UA Negative Negative    Ketones, UA 15 mg/dL (1+) (A) Negative    Bilirubin, UA Small (1+) (A) Negative    Blood, UA Negative Negative    Protein, UA Trace (A) Negative    Leuk Esterase, UA Small (1+) (A) Negative    Nitrite, UA Negative Negative    Urobilinogen, UA 1.0 E.U./dL 0.2 - 1.0 E.U./dL   Sedimentation Rate    Specimen: Blood   Result Value Ref Range    Sed Rate 15 0 - 15 mm/hr   CBC Auto Differential    Specimen:  Blood   Result Value Ref Range    WBC 17.00 (H) 3.40 - 10.80 10*3/mm3    RBC 5.34 4.14 - 5.80 10*6/mm3    Hemoglobin 16.5 13.0 - 17.7 g/dL    Hematocrit 48.5 37.5 - 51.0 %    MCV 90.9 79.0 - 97.0 fL    MCH 30.9 26.6 - 33.0 pg    MCHC 34.0 31.5 - 35.7 g/dL    RDW 13.2 12.3 - 15.4 %    RDW-SD 42.0 37.0 - 54.0 fl    MPV 6.9 6.0 - 12.0 fL    Platelets 322 140 - 450 10*3/mm3    Neutrophil % 72.5 42.7 - 76.0 %    Lymphocyte % 16.5 (L) 19.6 - 45.3 %    Monocyte % 9.9 5.0 - 12.0 %    Eosinophil % 0.1 (L) 0.3 - 6.2 %    Basophil % 1.0 0.0 - 1.5 %    Neutrophils, Absolute 12.30 (H) 1.70 - 7.00 10*3/mm3    Lymphocytes, Absolute 2.80 0.70 - 3.10 10*3/mm3    Monocytes, Absolute 1.70 (H) 0.10 - 0.90 10*3/mm3    Eosinophils, Absolute 0.00 0.00 - 0.40 10*3/mm3    Basophils, Absolute 0.20 0.00 - 0.20 10*3/mm3    nRBC 0.1 0.0 - 0.2 /100 WBC   Urinalysis, Microscopic Only - Urine, Clean Catch    Specimen: Urine, Clean Catch   Result Value Ref Range    RBC, UA 3-5 (A) None Seen /HPF    WBC, UA 31-50 (A) None Seen /HPF    Bacteria, UA None Seen None Seen /HPF    Squamous Epithelial Cells, UA 3-6 (A) None Seen, 0-2 /HPF    Hyaline Casts, UA 0-2 None Seen /LPF    Methodology Automated Microscopy      CT Abdomen Pelvis Without Contrast    Result Date: 9/22/2022  Normal CT examination of the abdomen and pelvis. Electronically signed by:  Kun La M.D.  9/21/2022 11:36 PM    Medications   sodium chloride 0.9 % flush 10 mL (has no administration in time range)   sodium chloride 0.9 % flush 10 mL (10 mL Intravenous Given 9/22/22 0248)   sodium chloride 0.9 % flush 10 mL (has no administration in time range)   acetaminophen (TYLENOL) tablet 650 mg (has no administration in time range)   ondansetron (ZOFRAN) injection 4 mg (has no administration in time range)   melatonin tablet 5 mg (has no administration in time range)   sodium chloride 0.9 % infusion (150 mL/hr Intravenous New Bag 9/22/22 0248)   lactated ringers bolus 1,000 mL (0  mL Intravenous Stopped 9/22/22 0223)   ondansetron (ZOFRAN) injection 4 mg (4 mg Intravenous Given 9/22/22 0049)   cefTRIAXone (ROCEPHIN) 1 g in sodium chloride 0.9 % 100 mL IVPB (1 g Intravenous New Bag 9/22/22 0229)   famotidine (PEPCID) injection 20 mg (20 mg Intravenous Given 9/22/22 0247)                                       MDM  Number of Diagnoses or Management Options  Generalized abdominal pain: new and requires workup  Persistent vomiting: new and requires workup  Urinary tract infection without hematuria, site unspecified: new and requires workup  Diagnosis management comments: Medical decision making.  Patient IV established patient was given 1 L lactated Ringer's.  The patient was given Pepcid 20 mg IV for milligrams of Zofran IV and had the above exam evaluation.  Labs obtained reviewed by me chemistries unremarkable liver enzymes unremarkable urine negative.  For nitrate and leukocyte but had 50 white cells I did culture that.  The patient had a sed rate of 15 white count was 17,000 CT abdomen pelvis obtained reviewed by me as well as radiology without acute findings.  The patient had his urine cultures get a gram Rocephin IV.  He has had persistent vomiting had a white count of 17,000 which may just be related to vomiting about 50 white cells in his urine.  He denies any penile discharge or worry of STD.  No testicular pain or swelling.  We talked about these findings.  But in light of his white count he will be placed in the observation.  He was made aware of findings labs CT scan obtained reviewed by me stable unremarkable ER course.       Amount and/or Complexity of Data Reviewed  Clinical lab tests: reviewed  Tests in the radiology section of CPT®: reviewed    Risk of Complications, Morbidity, and/or Mortality  Presenting problems: high  Diagnostic procedures: high  Management options: high    Patient Progress  Patient progress: stable      Final diagnoses:   Persistent vomiting   Generalized  abdominal pain   Urinary tract infection without hematuria, site unspecified       ED Disposition  ED Disposition     ED Disposition   Decision to Admit    Condition   --    Comment   --             No follow-up provider specified.       Medication List      No changes were made to your prescriptions during this visit.          Tate Levy MD  09/23/22 0144

## 2022-09-22 NOTE — ANESTHESIA POSTPROCEDURE EVALUATION
Patient: Chivo Billy    Procedure Summary     Date: 09/22/22 Room / Location: [facility] / [facility]    Anesthesia Start: 1219 Anesthesia Stop: 1233    Procedure: ESOPHAGOGASTRODUODENOSCOPY with biopsy x2 areas (N/A ) Diagnosis:       Persistent vomiting      (Persistent vomiting [R11.15])    Surgeons: Kendrick Meredith MD Provider: Jessee Orosco MD    Anesthesia Type: MAC ASA Status: 2          Anesthesia Type: MAC    Vitals  No vitals data found for the desired time range.          Post Anesthesia Care and Evaluation    Patient location during evaluation: PACU  Patient participation: complete - patient participated  Level of consciousness: awake  Pain scale: See nurse's notes for pain score.  Pain management: adequate    Airway patency: patent  Anesthetic complications: No anesthetic complications  PONV Status: none  Cardiovascular status: acceptable  Respiratory status: acceptable  Hydration status: acceptable    Comments: Patient seen and examined postoperatively; vital signs stable; SpO2 greater than or equal to 90%; cardiopulmonary status stable; nausea/vomiting adequately controlled; pain adequately controlled; no apparent anesthesia complications; patient discharged from anesthesia care when discharge criteria were met

## 2022-09-22 NOTE — CASE MANAGEMENT/SOCIAL WORK
Discharge Planning Assessment   [place]     Patient Name: Chivo Billy  MRN: [id number]  Today's Date: 9/22/2022    Admit Date: 9/22/2022    Plan: Off of floor for procedure   Discharge Needs Assessment    No documentation.                Discharge Plan     Row Name 09/22/22 1250       Plan    Plan Off of floor for procedure    Plan Comments Attempted to see patient for dc planning, but he is off of the unit for a procedure. Not case managed at this time.            Tatianna Clarke RN, [facility]  Office: [phone]  Fax: [phone]  [email or web address]      Phone communication or documentation only - no physical contact with patient or family.        Tatianna Clarke RN

## 2022-09-22 NOTE — OP NOTE
ESOPHAGOGASTRODUODENOSCOPY Procedure Report    Patient Name:  Chivo Billy  YOB: 1980    Date of Surgery:  9/22/2022     Pre-Op Diagnosis:  Persistent vomiting [R11.15]         Procedure/CPT® Codes:      Procedure(s):  ESOPHAGOGASTRODUODENOSCOPY with biopsy x2 areas    Staff:  Surgeon(s):  Kendrick Meredith MD      Anesthesia: Monitored Anesthesia Care    Description of Procedure:  A description of the procedure as well as risks, benefits and alternative methods were explained to the patient who voiced understanding and signed the corresponding consent form. A physical exam was performed and vital signs were monitored throughout the procedure.    An upper GI endoscope was placed into the mouth and proceeded through the esophagus, stomach and second portion of the duodenum without difficulty. The scope was then retroflexed and the fundus was visualized. The procedure was not difficult and there were no immediate complications.    At least 3 to 4 cm hiatal hernia mid esophageal biopsy was taken to rule out eosinophilic esophagitis due to concentric rings, grade B/C erosive esophagitis.  Multiple antral erosion and fundic gastritis was seen biopsy was performed.  Duodenal mucosa looks normal first and the second part duodenum.      Impression:  1.  Hiatal hernia.  2.  Possible eosinophilic esophagitis biopsy was taken.  3.  Grade C erosive esophagitis.  4.  Changes suggestive of chronic gastritis biopsy was performed.  5.  Normal duodenal mucosa    Recommendations:  Follow up with GI clinic as needed  Follow up with PCP as scheduled  Follow up with biopsy report  Patient will be placed on a PPI 40 mg p.o. daily and follow the GI clinic as needed.  Advised to discontinue THC use which is contributing factor to his cyclical nausea vomiting.  Add amitriptyline      Kendrick Meredith MD     Date: 9/22/2022    Time: 12:35 EDT

## 2022-09-22 NOTE — DISCHARGE SUMMARY
[facility]    Keisha Wilde, PATRICIA    CHIEF COMPLAINT:     Abdominal pain with nausea and vomiting    HISTORY OF PRESENT ILLNESS:    Obtained from admitting physician HPI on 9/22/2022:  History of present is a 42-year-old male who states he has had abdominal cramping nonstop vomiting diarrhea for last 24 hours and cannot hold anything down.  This moderate to severe worse whenever he tries to eat or drink anything that really makes it better.  No one at home with similar illness no foreign travels or antibiotic use no urinary problems.  No bleeding.  Patient states he did have some bloody stool last week but that went away.  He states it was bright red in nature.  The patient states he gets these spells intermittently every few months he will have vomiting and diarrhea but this goes away.  But this is the worst its been.  Has a lot of heartburn he states a lot of acid taste in his mouth.  No dysuria or frequency no penile discharge.  He has been at home.  No weight loss no night sweats.  No pain or swelling in the testicles.    9/22/2022 (postobservation admission):  Patient confirms the HPI noted above including a somewhat prolonged history of abdominal complaints occurring intermittently over the past 3 months with significant increase over the last 24 hours of pain located primarily in the epigastric area described as a burning sensation with some associated nausea and nonbloody vomiting.  Patient notes that he has had some diarrhea with the most recent episode approximately 1 week prior and has also noted some hematochezia in his formed stool and bowel movements over the past several days.  He also notes some blood on the toilet paper following wiping.  His abdominal pain is made somewhat worse with any p.o. intake of solids or liquids and he also reports a subjective fever.  Patient does continue to smoke approximately 2 packs of cigarettes per day drinks alcohol occasionally and  reports that he uses Aleve several times per week.  Symptoms are moderately improved following treatment in the ED and observation admission        Past Medical History:   Diagnosis Date   • Hyperlipidemia    • Hypertension      Past Surgical History:   Procedure Laterality Date   • HERNIA REPAIR       Family History   Problem Relation Age of Onset   • Psoriasis Father    • Heart disease Maternal Grandmother    • Heart disease Paternal Grandmother    • Diabetes Paternal Grandmother    • Alzheimer's disease Paternal Grandmother    • Hypertension Brother    • Bipolar disorder Mother      Social History     Tobacco Use   • Smoking status: Current Every Day Smoker     Packs/day: 2.00     Types: Cigarettes   • Smokeless tobacco: Never Used   Vaping Use   • Vaping Use: Never used   Substance Use Topics   • Alcohol use: Yes     Comment: socially   • Drug use: Not Currently     Frequency: 7.0 times per week     Types: Marijuana     Medications Prior to Admission   Medication Sig Dispense Refill Last Dose   • atorvastatin (LIPITOR) 10 MG tablet TAKE 1 TABLET BY MOUTH DAILY 90 tablet 1 More than a month at Unknown time   • hydrOXYzine (ATARAX) 10 MG tablet Take 1 tablet by mouth 3 (Three) Times a Day As Needed for Anxiety. 90 tablet 2 More than a month at Unknown time   • lisinopril (PRINIVIL,ZESTRIL) 5 MG tablet Take 1 tablet by mouth Daily. 90 tablet 1 More than a month at Unknown time   • vitamin D (ERGOCALCIFEROL) 1.25 MG (20141 UT) capsule capsule Take 1 capsule by mouth 1 (One) Time Per Week. 12 capsule 1 More than a month at Unknown time     Allergies:  Patient has no known allergies.    Immunization History   Administered Date(s) Administered   • Tdap 04/15/2022           REVIEW OF SYSTEMS:    Review of Systems   Constitutional: Positive for fever.   HENT: Negative.    Eyes: Negative.    Cardiovascular: Negative.    Respiratory: Negative.    Skin: Negative.    Musculoskeletal: Negative.    Gastrointestinal: Positive  for abdominal pain, diarrhea, hematochezia, nausea and vomiting.   Genitourinary: Negative.    Neurological: Negative.    Psychiatric/Behavioral: Negative.        Vital Signs  Temp:  [97.6 °F (36.4 °C)-99 °F (37.2 °C)] 98.2 °F (36.8 °C)  Heart Rate:  [] 61  Resp:  [10-18] 18  BP: ()/(60-95) 136/84          Physical Exam:  Physical Exam  Vitals reviewed.   Constitutional:       General: He is not in acute distress.     Appearance: Normal appearance. He is obese. He is not ill-appearing, toxic-appearing or diaphoretic.   HENT:      Head: Normocephalic.      Right Ear: External ear normal.      Left Ear: External ear normal.      Nose: Nose normal.      Mouth/Throat:      Mouth: Mucous membranes are moist.   Eyes:      Extraocular Movements: Extraocular movements intact.   Cardiovascular:      Rate and Rhythm: Normal rate and regular rhythm.      Pulses: Normal pulses.      Heart sounds: Normal heart sounds.   Pulmonary:      Effort: Pulmonary effort is normal.      Breath sounds: Normal breath sounds.   Abdominal:      General: Bowel sounds are normal. There is no distension.      Palpations: Abdomen is soft.      Tenderness: There is abdominal tenderness.   Musculoskeletal:         General: Normal range of motion.      Cervical back: Normal range of motion.      Right lower leg: No edema.      Left lower leg: No edema.   Skin:     General: Skin is warm and dry.      Capillary Refill: Capillary refill takes less than 2 seconds.   Neurological:      General: No focal deficit present.      Mental Status: He is alert and oriented to person, place, and time.   Psychiatric:         Mood and Affect: Mood normal.         Behavior: Behavior normal.         Thought Content: Thought content normal.         Judgment: Judgment normal.         Emotional Behavior:   Normal   Debilities:  None  Results Review:    I reviewed the patient's new clinical results.  Lab Results (most recent)     Procedure Component Value Units  "Date/Time    Procalcitonin [[id number]]  (Normal) Collected: 09/22/22 0036    Specimen: Blood Updated: 09/22/22 0739     Procalcitonin 0.03 ng/mL     Narrative:      As a Marker for Sepsis (Non-Neonates):    1. <0.5 ng/mL represents a low risk of severe sepsis and/or septic shock.  2. >2 ng/mL represents a high risk of severe sepsis and/or septic shock.    As a Marker for Lower Respiratory Tract Infections that require antibiotic therapy:    PCT on Admission    Antibiotic Therapy       6-12 Hrs later    >0.5                Strongly Recommended  >0.25 - <0.5        Recommended   0.1 - 0.25          Discouraged              Remeasure/reassess PCT  <0.1                Strongly Discouraged     Remeasure/reassess PCT    As 28 day mortality risk marker: \"Change in Procalcitonin Result\" (>80% or <=80%) if Day 0 (or Day 1) and Day 4 values are available. Refer to http://www.[employer] / [facility]-pct-calculator.com    Change in PCT <=80%  A decrease of PCT levels below or equal to 80% defines a positive change in PCT test result representing a higher risk for 28-day all-cause mortality of patients diagnosed with severe sepsis for septic shock.    Change in PCT >80%  A decrease of PCT levels of more than 80% defines a negative change in PCT result representing a lower risk for 28-day all-cause mortality of patients diagnosed with severe sepsis or septic shock.       Urine Culture - Urine, Urine, Clean Catch [[id number]] Collected: 09/22/22 0106    Specimen: Urine, Clean Catch Updated: 09/22/22 0211    Urinalysis, Microscopic Only - Urine, Clean Catch [[id number]]  (Abnormal) Collected: 09/22/22 0106    Specimen: Urine, Clean Catch Updated: 09/22/22 0120     RBC, UA 3-5 /HPF      WBC, UA 31-50 /HPF      Bacteria, UA None Seen /HPF      Squamous Epithelial Cells, UA 3-6 /HPF      Hyaline Casts, UA 0-2 /LPF      Methodology Automated Microscopy    Urinalysis With Microscopic If Indicated (No Culture) - Urine, Clean Catch [[id number]]  (Abnormal) " Collected: 09/22/22 0106    Specimen: Urine, Clean Catch Updated: 09/22/22 0120     Color, UA Dark Yellow     Appearance, UA Clear     pH, UA 5.5     Specific Gravity, UA 1.031     Glucose, UA Negative     Ketones, UA 15 mg/dL (1+)     Bilirubin, UA Small (1+)     Comment: Confirmation testing is unavailable.  A serum bilirubin is recommended for further assessment.        Blood, UA Negative     Protein, UA Trace     Leuk Esterase, UA Small (1+)     Nitrite, UA Negative     Urobilinogen, UA 1.0 E.U./dL    Comprehensive Metabolic Panel [[id number]]  (Abnormal) Collected: 09/22/22 0036    Specimen: Blood Updated: 09/22/22 0113     Glucose 103 mg/dL      BUN 11 mg/dL      Creatinine 1.07 mg/dL      Sodium 139 mmol/L      Potassium 3.6 mmol/L      Chloride 101 mmol/L      CO2 25.0 mmol/L      Calcium 9.1 mg/dL      Total Protein 7.3 g/dL      Albumin 4.40 g/dL      ALT (SGPT) 19 U/L      AST (SGOT) 16 U/L      Alkaline Phosphatase 76 U/L      Total Bilirubin 0.7 mg/dL      Globulin 2.9 gm/dL      A/G Ratio 1.5 g/dL      BUN/Creatinine Ratio 10.3     Anion Gap 13.0 mmol/L      eGFR 90.0 mL/min/1.73      Comment: National Kidney Foundation and American Society of Nephrology (ASN) Task Force recommended calculation based on the Chronic Kidney Disease Epidemiology Collaboration (CKD-EPI) equation refit without adjustment for race.       Narrative:      GFR Normal >60  Chronic Kidney Disease <60  Kidney Failure <15      Lipase [[id number]]  (Normal) Collected: 09/22/22 0036    Specimen: Blood Updated: 09/22/22 0113     Lipase 23 U/L     Sedimentation Rate [[id number]]  (Normal) Collected: 09/22/22 0036    Specimen: Blood Updated: 09/22/22 0055     Sed Rate 15 mm/hr     CBC & Differential [[id number]]  (Abnormal) Collected: 09/22/22 0036    Specimen: Blood Updated: 09/22/22 0049    Narrative:      The following orders were created for panel order CBC & Differential.  Procedure                               Abnormality          Status                     ---------                               -----------         ------                     CBC Auto Differential[[id number]]        Abnormal            Final result                 Please view results for these tests on the individual orders.    CBC Auto Differential [[id number]]  (Abnormal) Collected: 09/22/22 0036    Specimen: Blood Updated: 09/22/22 0049     WBC 17.00 10*3/mm3      RBC 5.34 10*6/mm3      Hemoglobin 16.5 g/dL      Hematocrit 48.5 %      MCV 90.9 fL      MCH 30.9 pg      MCHC 34.0 g/dL      RDW 13.2 %      RDW-SD 42.0 fl      MPV 6.9 fL      Platelets 322 10*3/mm3      Neutrophil % 72.5 %      Lymphocyte % 16.5 %      Monocyte % 9.9 %      Eosinophil % 0.1 %      Basophil % 1.0 %      Neutrophils, Absolute 12.30 10*3/mm3      Lymphocytes, Absolute 2.80 10*3/mm3      Monocytes, Absolute 1.70 10*3/mm3      Eosinophils, Absolute 0.00 10*3/mm3      Basophils, Absolute 0.20 10*3/mm3      nRBC 0.1 /100 WBC           Imaging Results (Most Recent)     Procedure Component Value Units Date/Time    CT Abdomen Pelvis Without Contrast [[id number]] Collected: 09/22/22 0129     Updated: 09/22/22 0137    Narrative:      EXAMINATION:  CT SCAN OF THE ABDOMEN AND PELVIS WITHOUT INTRAVENOUS CONTRAST    DATE OF EXAM: 9/22/2022 1:16 AM     HISTORY: Severe diffuse abdominal pain      COMPARISON: 12/5/2009.    TECHNIQUE: CT examination of the abdomen and pelvis with sagittal and coronal reformations was performed without intravenous contrast.  CT dose lowering techniques were used, to include: automated exposure control, adjustment for patient size, and/or use   of iterative reconstruction.    Note: The exam is limited because some types of pathology may not be adequately demonstrated due to lack of contrast enhancement.       FINDINGS:    ABDOMEN/PELVIS:    Lower Chest:  Normal.     Liver: Normal.     Gallbladder/Biliary: Normal.     Pancreas: Normal.    Spleen: Normal.     Adrenal Glands: Normal.      Kidneys: Normal.    GI Tract: Normal.     Mesentery/Peritoneum: Normal.    Vasculature: Normal.     Lymph Nodes: Normal.     Abdominal Wall: Normal.     Bladder: Normal.     Reproductive: Normal.     Musculoskeletal: Normal.        Impression:      Normal CT examination of the abdomen and pelvis.    Electronically signed by:  Kun La M.D.    9/21/2022 11:36 PM        reviewed    ECG/EMG Results (most recent)     None        not reviewed            Microbiology Results (last 10 days)     Procedure Component Value - Date/Time    Urine Culture - Urine, Urine, Clean Catch [[id number]]  (Normal) Collected: 09/22/22 0106    Lab Status: Preliminary result Specimen: Urine, Clean Catch Updated: 09/22/22 8409     Urine Culture Culture in progress          Assessment & Plan     Persistent vomiting       Abdominal pain with nausea and vomiting  -WBCs: 17.00 with an increased absolute neutrophil and monocyte count noted on differential  -CMP generally unremarkable  -Procalcitonin: 0.03, lipase: 23  -UA showed no bacteria with 31-50 WBCs, 3-5 RBCs, 1+ bilirubin, trace protein, 1+ leukocyte Estrace, negative nitrites, 1+ ketones and a specific gravity of 1.031 the specimen did appear to be contaminated with 3-6 squamous epithelial cells noted  -CT of abdomen and pelvis reported as normal  -In the ED patient given famotidine, Rocephin, 1 L fluid bolus and Zofran  -Continue saline infusion  -Start Protonix  -GI consulted  -EGD performed with hiatal hernia noted as well as possible eosinophilic esophagitis with grade C erosive esophagitis also present and changes suggestive of chronic gastritis with normal duodenal mucosa with recommendations to initiate PPI and amitriptyline as well as discontinue THC use and follow-up outpatient    Hyperlipidemia  -Statin    Tobacco abuse  -Patient reports he currently smokes approximately 2 packs of cigarettes per day  -Encourage cessation  -Nicotine patch    Obesity (BMI:  30.84)  -Encourage diet lifestyle modifications    I discussed the patients findings and my recommendations with patient and nursing staff.     Discharge Diagnosis:      Persistent vomiting      Hospital Course  Patient is a 42 y.o. male presented with abdominal pain with nausea, vomiting and diarrhea with an HPI noted above.  Patient is found to have a leukocytosis with WBCs of 17.00 with an increased absolute neutrophil and monocyte count noted on differential.  CMP was obtained and was generally unremarkable and procalcitonin and lipase were both assessed and found to be within normal limits.  UA showed no bacteria though with WBCs, RBCs, bilirubin, protein and leukocyte esterase noted.  1+ ketones were present with specific gravity slightly elevated at 1.031 the specimen did appear to be contaminated with 3-6 squamous epithelial cells noted.  CT of abdomen and pelvis was obtained in the ED and was reported as negative.  He was given IV fluid bolus followed by infusion as well as Rocephin, famotidine and Zofran with some improvement in symptoms throughout his admission.  IV Protonix was started and GI was consulted and EGD was performed which showed hiatal hernia as well as possible eosinophilic esophagitis, grade C erosive esophagitis and findings suggestive of chronic gastritis.  GI physician recommended initiation of PPI therapy as well as amitriptyline and discontinuation of THC use with outpatient follow-up for further management and biopsy results.  At this time patient felt to be in good condition for discharge with close follow-up with his PCP as well as GI on an outpatient basis.  His full testing/results and plan were discussed with patient along with concerning/alarm symptoms for which to call 911/return to the ED.  All questions were answered and he verbalizes understanding and agreement.    Past Medical History:     Past Medical History:   Diagnosis Date   • Hyperlipidemia    • Hypertension         Past Surgical History:     Past Surgical History:   Procedure Laterality Date   • HERNIA REPAIR         Social History:   Social History     Socioeconomic History   • Marital status: Single   Tobacco Use   • Smoking status: Current Every Day Smoker     Packs/day: 2.00     Types: Cigarettes   • Smokeless tobacco: Never Used   Vaping Use   • Vaping Use: Never used   Substance and Sexual Activity   • Alcohol use: Yes     Comment: socially   • Drug use: Not Currently     Frequency: 7.0 times per week     Types: Marijuana   • Sexual activity: Defer       Procedures Performed    09/22 1214 [facility]    Consults:   Consults     Date and Time Order Name Status Description    9/22/2022  2:32 AM Inpatient Gastroenterology Consult Completed           Condition on Discharge:     Stable    Discharge Disposition  Home or Self Care    Discharge Medications     Discharge Medications      New Medications      Instructions Start Date   amitriptyline 25 MG tablet  Commonly known as: ELAVIL   25 mg, Oral, Nightly      pantoprazole 40 MG EC tablet  Commonly known as: PROTONIX   40 mg, Oral, Daily         Continue These Medications      Instructions Start Date   atorvastatin 10 MG tablet  Commonly known as: LIPITOR   10 mg, Oral, Daily      hydrOXYzine 10 MG tablet  Commonly known as: ATARAX   10 mg, Oral, 3 Times Daily PRN      lisinopril 5 MG tablet  Commonly known as: PRINIVIL,ZESTRIL   5 mg, Oral, Daily      vitamin D 1.25 MG (59800 UT) capsule capsule  Commonly known as: ERGOCALCIFEROL   50,000 Units, Oral, Weekly             Discharge Diet:     Activity at Discharge:     Follow-up Appointments  Future Appointments   Date Time Provider Department Center   10/7/2022  9:00 AM Keisha Wilde APRN [facility]  [facility]     Additional Instructions for the Follow-ups that You Need to Schedule     Discharge Follow-up with PCP   As directed       Currently Documented PCP:    Keisha Wilde APRN    PCP Phone Number:     [phone]     Follow Up Details: 5 to 7 days         Discharge Follow-up with Specified Provider: GI   As directed      To: GI               Test Results Pending at Discharge  Pending Labs     Order Current Status    Tissue Pathology Exam In process    Urine Culture - Urine, Urine, Clean Catch Preliminary result           Risk for Readmission (LACE) Score: 1 (9/22/2022  6:00 AM)          Kun Pemberton PA-C  09/22/22  16:01 EDT

## 2022-09-22 NOTE — CONSULTS
GI CONSULT  NOTE:    Referring Provider:  Dr. Levy    Chief complaint: Nausea/vomiting    Subjective .     History of present illness: Chivo Billy is a 42 y.o. male with history of depression, hypertension, hyperlipidemia, and hernia repair who presents with complaints of nausea/vomiting.  Patient reports an acute onset of nausea/vomiting approximately 24 to 36 hours ago.  He denies any hematemesis or coffee-ground emesis.  Has been unable to identify any triggering factors for his nausea/vomiting.  However, he does report 5-6 episodes of recurrent nausea/vomiting in the past 3 months.  States that he typically awakens from sleep with nausea.  He does admit to taking Aleve as needed, but denies daily NSAID use.  Also reports intermittent heartburn depending on dietary choices.  Denies any dysphagia.  He denies any diarrhea.  States that he typically moves his bowels daily.  However, does report feelings of tenesmus with inability to pass stool recently.  No bright red blood per rectum or melena.  Of note, he does report marijuana use.  Social alcohol use.    Endo History:  No previous endoscopy.    Past Medical History:  Past Medical History:   Diagnosis Date   • Hyperlipidemia    • Hypertension        Past Surgical History:  Past Surgical History:   Procedure Laterality Date   • HERNIA REPAIR         Social History:  Social History     Tobacco Use   • Smoking status: Current Every Day Smoker     Packs/day: 2.00     Types: Cigarettes   • Smokeless tobacco: Never Used   Vaping Use   • Vaping Use: Never used   Substance Use Topics   • Alcohol use: Yes     Comment: socially   • Drug use: Not Currently     Frequency: 7.0 times per week     Types: Marijuana       Family History:  Family History   Problem Relation Age of Onset   • Psoriasis Father    • Heart disease Maternal Grandmother    • Heart disease Paternal Grandmother    • Diabetes Paternal Grandmother    • Alzheimer's disease Paternal Grandmother    •  "Hypertension Brother    • Bipolar disorder Mother        Medications:  Medications Prior to Admission   Medication Sig Dispense Refill Last Dose   • atorvastatin (LIPITOR) 10 MG tablet TAKE 1 TABLET BY MOUTH DAILY 90 tablet 1 More than a month at Unknown time   • hydrOXYzine (ATARAX) 10 MG tablet Take 1 tablet by mouth 3 (Three) Times a Day As Needed for Anxiety. 90 tablet 2 More than a month at Unknown time   • lisinopril (PRINIVIL,ZESTRIL) 5 MG tablet Take 1 tablet by mouth Daily. 90 tablet 1 More than a month at Unknown time   • vitamin D (ERGOCALCIFEROL) 1.25 MG (29112 UT) capsule capsule Take 1 capsule by mouth 1 (One) Time Per Week. 12 capsule 1 More than a month at Unknown time       Scheduled Meds:atorvastatin, 10 mg, Oral, Nightly  nicotine, 1 patch, Transdermal, Q24H  pantoprazole, 40 mg, Intravenous, Q AM  sodium chloride, 10 mL, Intravenous, Q12H      Continuous Infusions:sodium chloride, 150 mL/hr, Last Rate: 150 mL/hr (09/22/22 0545)      PRN Meds:.•  acetaminophen  •  melatonin  •  ondansetron  •  [COMPLETED] Insert peripheral IV **AND** sodium chloride  •  sodium chloride    ALLERGIES:  Patient has no known allergies.    ROS:  The following systems were reviewed and negative;   Constitution:  No fevers, chills, no unintentional weight loss  Skin: no rash, no jaundice  Eyes:  No blurry vision, no eye pain  HENT:  No change in hearing or smell  Resp:  No dyspnea or cough  CV:  No chest pain or palpitations  :  No dysuria, hematuria  Musculoskeletal:  No leg cramps or arthralgias  Neuro:  No tremor, no numbness  Psych:  No depression or confusion    Objective     Vital Signs:   Vitals:    09/22/22 0431 09/22/22 0446 09/22/22 0457 09/22/22 0530   BP: 91/60 95/66 120/87 127/79   BP Location:    Left arm   Patient Position:    Lying   Pulse: 69 67 80 73   Resp:    17   Temp:    98.2 °F (36.8 °C)   TempSrc:    Oral   SpO2: 95% 96% 96% 96%   Weight:    92 kg (202 lb 12.8 oz)   Height:    172.7 cm (68\") "       Physical Exam:       General Appearance:    Awake and alert, in no acute distress   Head:    Normocephalic, without obvious abnormality, atraumatic   Throat:   No oral lesions, no thrush, oral mucosa moist   Lungs:     Respirations regular, even and unlabored   Chest Wall:    No abnormalities observed   Abdomen:     Soft, non-tender, no rebound or guarding, non-distended   Rectal:     Deferred   Extremities:   Moves all extremities, no edema, no cyanosis   Pulses:   Pulses palpable and equal bilaterally   Skin:   No rash, no jaundice, normal palpation   Lymph nodes:   No cervical, supraclavicular or submandibular palpable adenopathy   Neurologic:   Cranial nerves 2 - 12 grossly intact, no asterixis       Results Review:   I reviewed the patient's labs and imaging.  CBC    Results from last 7 days   Lab Units 09/22/22  0851 09/22/22  0036   WBC 10*3/mm3 8.20 17.00*   HEMOGLOBIN g/dL 14.5 16.5   PLATELETS 10*3/mm3 250 322     CMP   Results from last 7 days   Lab Units 09/22/22  0851 09/22/22  0036   SODIUM mmol/L 141 139   POTASSIUM mmol/L 3.6 3.6   CHLORIDE mmol/L 106 101   CO2 mmol/L 28.0 25.0   BUN mg/dL 11 11   CREATININE mg/dL 0.99 1.07   GLUCOSE mg/dL 105* 103*   ALBUMIN g/dL  --  4.40   BILIRUBIN mg/dL  --  0.7   ALK PHOS U/L  --  76   AST (SGOT) U/L  --  16   ALT (SGPT) U/L  --  19   LIPASE U/L  --  23     Cr Clearance Estimated Creatinine Clearance: 109.1 mL/min (by C-G formula based on SCr of 0.99 mg/dL).  Coag     HbA1C No results found for: HGBA1C  Blood Glucose No results found for: POCGLU  Infection   Results from last 7 days   Lab Units 09/22/22  0036   PROCALCITONIN ng/mL 0.03     UA    Results from last 7 days   Lab Units 09/22/22  0106   NITRITE UA  Negative   WBC UA /HPF 31-50*   BACTERIA UA /HPF None Seen   SQUAM EPITHEL UA /HPF 3-6*     Radiology(recent) CT Abdomen Pelvis Without Contrast    Result Date: 9/22/2022  Normal CT examination of the abdomen and pelvis. Electronically signed by:   Kun La M.D.  9/21/2022 11:36 PM         ASSESSMENT:  -Persistent nausea/vomiting -consider ulcer, cannabis hyperemesis syndrome, gastritis, or other  -Dyspepsia  -Tenesmus  -Marijuana use  -Depression  -Hypertension  -Hyperlipidemia  -History of hernia repair    PLAN:  Patient is a 42-year-old male with history of depression, hypertension, and hernia repair who presents with complaints of nausea/vomiting.    CT abdomen/pelvis on admission is unremarkable.  CMP and lipase normal.  WBC count elevated at 17.  We will proceed with EGD today for further evaluation.  Maintain NPO.  Increase PPI to twice daily dosing.  Start Reglan.  Recommend complete marijuana cessation.  We will start amitriptyline.  Antiemetics as needed.  Supportive care.      I discussed the patients findings and my recommendations with the patient.  I will discuss the case with Dr. Meredith and change the plan accordingly.    We appreciate the referral.    Electronically signed by PATRICIA Shaver, 09/22/22, 9:37 AM EDT.

## 2022-09-22 NOTE — ANESTHESIA PREPROCEDURE EVALUATION
Anesthesia Evaluation     Patient summary reviewed and Nursing notes reviewed   NPO Solid Status: > 8 hours  NPO Liquid Status: > 8 hours           Airway   Mallampati: I  TM distance: >3 FB  Neck ROM: full  No difficulty expected  Dental - normal exam     Pulmonary - normal exam   (+) a smoker Current Abstained day of surgery,   Cardiovascular - normal exam    (+) hypertension, hyperlipidemia,       Neuro/Psych- negative ROS  GI/Hepatic/Renal/Endo - negative ROS     Musculoskeletal (-) negative ROS    Abdominal  - normal exam    Bowel sounds: normal.   Substance History - negative use     OB/GYN negative ob/gyn ROS         Other                        Anesthesia Plan    ASA 2     MAC     intravenous induction     Anesthetic plan, risks, benefits, and alternatives have been provided, discussed and informed consent has been obtained with: patient.        CODE STATUS:

## 2022-09-22 NOTE — PLAN OF CARE
Problem: Adult Inpatient Plan of Care  Goal: Plan of Care Review  Outcome: Ongoing, Progressing  Flowsheets (Taken 9/22/2022 1230)  Plan of Care Reviewed With: patient  Outcome Evaluation: Patient having EGD today.  Pt has had no c/o nausea or vomiting, abd pain today.  Rested throughout morning. Will continue to monitor.  Goal: Patient-Specific Goal (Individualized)  Outcome: Ongoing, Progressing  Goal: Absence of Hospital-Acquired Illness or Injury  Outcome: Ongoing, Progressing  Intervention: Identify and Manage Fall Risk  Recent Flowsheet Documentation  Taken 9/22/2022 1200 by Niharika Nichole, RN  Safety Promotion/Fall Prevention: patient off unit  Taken 9/22/2022 1025 by Niharika Nichole RN  Safety Promotion/Fall Prevention: (Patient signed consent- ready for EGD)   safety round/check completed   room organization consistent   nonskid shoes/slippers when out of bed  Taken 9/22/2022 1000 by Niharika Nichole RN  Safety Promotion/Fall Prevention:   safety round/check completed   room organization consistent   nonskid shoes/slippers when out of bed  Taken 9/22/2022 0742 by Niharika Nichole RN  Safety Promotion/Fall Prevention:   safety round/check completed   room organization consistent   nonskid shoes/slippers when out of bed   lighting adjusted   fall prevention program maintained   clutter free environment maintained   assistive device/personal items within reach   activity supervised  Intervention: Prevent Skin Injury  Recent Flowsheet Documentation  Taken 9/22/2022 0742 by Niharika Nichole, RN  Body Position: position changed independently  Intervention: Prevent and Manage VTE (Venous Thromboembolism) Risk  Recent Flowsheet Documentation  Taken 9/22/2022 1000 by Niharika Nichole RN  Activity Management: up ad teresa  Taken 9/22/2022 0742 by Niharika Nichole RN  Activity Management:   up ad teresa   activity adjusted per tolerance  Goal: Optimal Comfort and Wellbeing  Outcome: Ongoing,  Progressing  Intervention: Provide Person-Centered Care  Recent Flowsheet Documentation  Taken 9/22/2022 0742 by Niharika Nichole, RN  Trust Relationship/Rapport:   care explained   choices provided   questions answered   questions encouraged   reassurance provided   thoughts/feelings acknowledged  Goal: Readiness for Transition of Care  Outcome: Ongoing, Progressing     Problem: Pain Acute  Goal: Acceptable Pain Control and Functional Ability  Outcome: Ongoing, Progressing  Intervention: Prevent or Manage Pain  Recent Flowsheet Documentation  Taken 9/22/2022 0742 by Niharika Nichole RN  Sensory Stimulation Regulation:   care clustered   lighting decreased  Medication Review/Management: medications reviewed  Intervention: Optimize Psychosocial Wellbeing  Recent Flowsheet Documentation  Taken 9/22/2022 0742 by Niharika Nichole, RN  Supportive Measures:   active listening utilized   counseling provided   Goal Outcome Evaluation:  Plan of Care Reviewed With: patient           Outcome Evaluation: Patient having EGD today.  Pt has had no c/o nausea or vomiting, abd pain today.  Rested throughout morning. Will continue to monitor.

## 2022-09-22 NOTE — CASE MANAGEMENT/SOCIAL WORK
Social Work Assessment   [facility]     Patient Name: Chivo Billy  MRN: [id number]  Today's Date: 9/22/2022    Admit Date: 9/22/2022     Substance Abuse     Row Name 09/22/22 1447       Substance Use    Substance Use Status current street drug/inhalant/medication abuse    Substance Use Comment SW attempted to see pt to offer substance abuse treatement resources, but pt JENNIFER.              No physical contact with patient on this date.    CECILY Salamanca, LSW  Medical Social Worker  Ph [phone]  Fax [phone]  [email or web address]

## 2022-09-22 NOTE — PLAN OF CARE
Problem: Adult Inpatient Plan of Care  Goal: Plan of Care Review  Outcome: Ongoing, Not Progressing  Flowsheets (Taken 9/22/2022 0558)  Progress: no change  Plan of Care Reviewed With: patient  Outcome Evaluation: new admit for nausea/vomiting/abdominal pain, patient to have GI consult, NPO, getting IV fluids, PRN tylenol given for headache, VSS, not on tele, no nausea/vomiting at this time, will monitor, pt stable  Goal: Patient-Specific Goal (Individualized)  Outcome: Ongoing, Not Progressing  Goal: Absence of Hospital-Acquired Illness or Injury  Outcome: Ongoing, Not Progressing  Goal: Optimal Comfort and Wellbeing  Outcome: Ongoing, Not Progressing  Goal: Readiness for Transition of Care  Outcome: Ongoing, Not Progressing  Intervention: Mutually Develop Transition Plan  Recent Flowsheet Documentation  Taken 9/22/2022 0557 by Lorna Shen, RN  Transportation Anticipated: family or friend will provide  Patient/Family Anticipated Services at Transition: none  Patient/Family Anticipates Transition to: home with family  Taken 9/22/2022 0555 by Lorna Shen, RN  Equipment Currently Used at Home: none     Problem: Pain Acute  Goal: Acceptable Pain Control and Functional Ability  Outcome: Ongoing, Not Progressing     Problem: Nausea and Vomiting  Goal: Fluid and Electrolyte Balance  Outcome: Ongoing, Not Progressing     Problem: Hypertension Comorbidity  Goal: Blood Pressure in Desired Range  Outcome: Ongoing, Not Progressing   Goal Outcome Evaluation:  Plan of Care Reviewed With: patient        Progress: no change  Outcome Evaluation: new admit for nausea/vomiting/abdominal pain, patient to have GI consult, NPO, getting IV fluids, PRN tylenol given for headache, VSS, not on tele, no nausea/vomiting at this time, will monitor, pt stable

## 2022-09-22 NOTE — OUTREACH NOTE
Prep Survey    Flowsheet Row Responses   [facility] facility patient discharged from? [facility]   Is LACE score < 7 ? Yes   Emergency Room discharge w/ pulse ox? No   Eligibility [facility]   Date of Admission 09/22/22   Date of Discharge 09/22/22   Discharge Disposition Home or Self Care   Discharge diagnosis persistent vomiting, EGD   Does the patient have one of the following disease processes/diagnoses(primary or secondary)? Other   Does the patient have Home health ordered? No   Is there a DME ordered? No   Prep survey completed? Yes          MICHAEL CADENA - Registered Nurse

## 2022-09-22 NOTE — PLAN OF CARE
Problem: Adult Inpatient Plan of Care  Goal: Plan of Care Review  Outcome: Met  Goal: Patient-Specific Goal (Individualized)  Outcome: Met  Goal: Absence of Hospital-Acquired Illness or Injury  Outcome: Met  Goal: Optimal Comfort and Wellbeing  Outcome: Met  Goal: Readiness for Transition of Care  Outcome: Met     Problem: Pain Acute  Goal: Acceptable Pain Control and Functional Ability  Outcome: Met     Problem: Nausea and Vomiting  Goal: Fluid and Electrolyte Balance  Outcome: Met     Problem: Hypertension Comorbidity  Goal: Blood Pressure in Desired Range  Outcome: Met   Goal Outcome Evaluation:

## 2022-09-23 ENCOUNTER — TRANSITIONAL CARE MANAGEMENT TELEPHONE ENCOUNTER (OUTPATIENT)
Dept: CALL CENTER | Facility: HOSPITAL | Age: 42
End: 2022-09-23

## 2022-09-23 LAB — BACTERIA SPEC AEROBE CULT: NORMAL

## 2022-09-23 NOTE — OUTREACH NOTE
Call Center TCM Note    Flowsheet Row Responses   [facility] patient discharged from? [facility]   Does the patient have one of the following disease processes/diagnoses(primary or secondary)? Other   TCM attempt successful? Yes  [Verbal release on file/Mother]   Call start time 1436   Call end time 1440   Discharge diagnosis persistent vomiting, EGD   Meds reviewed with patient/caregiver? Yes   Is the patient having any side effects they believe may be caused by any medication additions or changes? No   Does the patient have all medications ordered at discharge? N/A   Is the patient taking all medications as directed (includes completed medication regime)? Yes   Comments --  [Patient has previously scheduled appt on 10/7 that he wishes to keep. No earlier appt available with provider and declined to schedule with someone else.]   Has home health visited the patient within 72 hours of discharge? N/A   Psychosocial issues? No   Did the patient receive a copy of their discharge instructions? Yes   Nursing interventions Reviewed instructions with patient   What is the patient's perception of their health status since discharge? Improving   Is the patient/caregiver able to teach back signs and symptoms related to disease process for when to call PCP? Yes   Is the patient/caregiver able to teach back signs and symptoms related to disease process for when to call 911? Yes   Is the patient/caregiver able to teach back the hierarchy of who to call/visit for symptoms/problems? PCP, Specialist, Home health nurse, Urgent Care, ED, 911 Yes   If the patient is a current smoker, are they able to teach back resources for cessation? Smoking cessation medications   TCM call completed? Yes   Wrap up additional comments Patient reports he has improved, taking all med as prescribed.          Josiane Shrestha RN    9/23/2022, 14:44 EDT

## 2022-09-23 NOTE — CASE MANAGEMENT/SOCIAL WORK
Case Management Discharge Note                Selected Continued Care - Discharged on 9/22/2022 Admission date: 9/22/2022 - Discharge disposition: Home or Self Care

## 2022-09-26 LAB
LAB AP CASE REPORT: NORMAL
PATH REPORT.FINAL DX SPEC: NORMAL
PATH REPORT.GROSS SPEC: NORMAL

## 2022-10-07 ENCOUNTER — OFFICE VISIT (OUTPATIENT)
Dept: FAMILY MEDICINE CLINIC | Facility: CLINIC | Age: 42
End: 2022-10-07

## 2022-10-07 VITALS
TEMPERATURE: 98.2 F | DIASTOLIC BLOOD PRESSURE: 84 MMHG | SYSTOLIC BLOOD PRESSURE: 120 MMHG | HEART RATE: 79 BPM | OXYGEN SATURATION: 99 % | WEIGHT: 204 LBS | BODY MASS INDEX: 31.02 KG/M2

## 2022-10-07 DIAGNOSIS — F41.9 ANXIETY: ICD-10-CM

## 2022-10-07 DIAGNOSIS — E78.2 MIXED HYPERLIPIDEMIA: ICD-10-CM

## 2022-10-07 DIAGNOSIS — K21.9 GASTROESOPHAGEAL REFLUX DISEASE, UNSPECIFIED WHETHER ESOPHAGITIS PRESENT: ICD-10-CM

## 2022-10-07 DIAGNOSIS — I10 ESSENTIAL HYPERTENSION: Primary | ICD-10-CM

## 2022-10-07 DIAGNOSIS — E55.9 VITAMIN D DEFICIENCY: ICD-10-CM

## 2022-10-07 PROCEDURE — 99214 OFFICE O/P EST MOD 30 MIN: CPT | Performed by: NURSE PRACTITIONER

## 2022-10-07 NOTE — PROGRESS NOTES
Subjective   Chivo Billy is a 42 y.o. male.       HPI   Pt is here today for follow up.   1) HTN - was taking lisinopril 5 mg daily. Stopped taking this about 3 months ago on his own; says it was because he started to smoke marijuana again. He also smoke cigs daily.  BP is stable today; he isn't monitoring at home.  Denies any Cp; palpitations; SOA; dizziness; headache; trouble with vision.   2) Hyperlipidemia -  Was taking atorvastatin 10 mg daily. Stopped this about 3 months ago also.  Admits he has not made changes to improve his diet.     3) Anxiety - no longer taking hydroxyzine as needed.  Has amitriptyline 25 mg nightly.  Feels symptoms are stable.    4) GERD- currently on pantoprazole 40 mg daily. Recently seen at hospital and found to have reflux, hitial hernia, vomiting, ulcer.  He is being followed by GI.   5) Vitamin D def- no longer on weekly supplement.     The following portions of the patient's history were reviewed and updated as appropriate: allergies, current medications, past family history, past medical history, past social history, past surgical history and problem list.    Review of Systems   Constitutional: Negative for chills, fatigue and fever.   Eyes: Negative for visual disturbance.   Respiratory: Negative for cough, chest tightness, shortness of breath and wheezing.    Cardiovascular: Negative for chest pain and palpitations.   Gastrointestinal: Negative for abdominal pain, blood in stool, constipation, diarrhea, nausea, vomiting and indigestion.   Endocrine: Negative for polydipsia, polyphagia and polyuria.   Genitourinary: Negative for dysuria, frequency, hematuria and urgency.   Musculoskeletal: Negative for arthralgias and myalgias.   Neurological: Negative for dizziness, weakness and headache.   Psychiatric/Behavioral: Negative for depressed mood. The patient is not nervous/anxious.        Objective   Physical Exam  Vitals reviewed.   Constitutional:       General: He is not in  acute distress.     Appearance: Normal appearance. He is obese.   Cardiovascular:      Rate and Rhythm: Normal rate and regular rhythm.      Pulses: Normal pulses.      Heart sounds: Normal heart sounds. No murmur heard.  Pulmonary:      Effort: Pulmonary effort is normal. No respiratory distress.      Breath sounds: Normal breath sounds. No wheezing.   Chest:      Chest wall: No tenderness.   Abdominal:      Tenderness: There is no right CVA tenderness or left CVA tenderness.   Musculoskeletal:      Right lower leg: No edema.      Left lower leg: No edema.   Skin:     General: Skin is warm and dry.      Findings: No erythema.   Neurological:      General: No focal deficit present.      Mental Status: He is alert and oriented to person, place, and time.   Psychiatric:         Mood and Affect: Mood normal.           Assessment & Plan   Diagnoses and all orders for this visit:    1. Essential hypertension (Primary)  Comments:  BP stable today.   Advised home monitoring.   Work on healthy diet; exercise; weight loss.   Encouarged smoking cessation.   Orders:  -     Comprehensive metabolic panel; Future  -     Lipid panel; Future    2. Mixed hyperlipidemia  Comments:  Labs ordered.   Consider re-start on atorvastatin.   Work on healthy diet, exercise, weight loss.   Orders:  -     Comprehensive metabolic panel; Future  -     Lipid panel; Future    3. Anxiety  Comments:  Stable.   Cont. current medication.   RTO in 6 mo.     4. Gastroesophageal reflux disease, unspecified whether esophagitis present  Comments:  Stable.   Cont. current medication.   Keep f/u with GI.     5. Vitamin D deficiency  Comments:  Labs ordered.   Orders:  -     Vitamin D 25 hydroxy; Future              High BMI Plan  General weight loss/lifestyle modification strategies discussed (elicit support from others; identify saboteurs; non-food rewards, etc).  Informal exercise measures discussed, e.g. taking stairs instead of elevator.

## 2022-11-21 ENCOUNTER — LAB (OUTPATIENT)
Dept: FAMILY MEDICINE CLINIC | Facility: CLINIC | Age: 42
End: 2022-11-21

## 2022-11-21 DIAGNOSIS — I10 ESSENTIAL HYPERTENSION: ICD-10-CM

## 2022-11-21 DIAGNOSIS — E78.2 MIXED HYPERLIPIDEMIA: ICD-10-CM

## 2022-11-21 DIAGNOSIS — E55.9 VITAMIN D DEFICIENCY: ICD-10-CM

## 2022-11-21 LAB
25(OH)D3 SERPL-MCNC: 21.4 NG/ML (ref 30–100)
ALBUMIN SERPL-MCNC: 4.3 G/DL (ref 3.5–5.2)
ALBUMIN/GLOB SERPL: 2 G/DL
ALP SERPL-CCNC: 68 U/L (ref 39–117)
ALT SERPL W P-5'-P-CCNC: 21 U/L (ref 1–41)
ANION GAP SERPL CALCULATED.3IONS-SCNC: 7 MMOL/L (ref 5–15)
AST SERPL-CCNC: 14 U/L (ref 1–40)
BILIRUB SERPL-MCNC: 0.5 MG/DL (ref 0–1.2)
BUN SERPL-MCNC: 8 MG/DL (ref 6–20)
BUN/CREAT SERPL: 8.7 (ref 7–25)
CALCIUM SPEC-SCNC: 9.3 MG/DL (ref 8.6–10.5)
CHLORIDE SERPL-SCNC: 105 MMOL/L (ref 98–107)
CHOLEST SERPL-MCNC: 220 MG/DL (ref 0–200)
CO2 SERPL-SCNC: 28 MMOL/L (ref 22–29)
CREAT SERPL-MCNC: 0.92 MG/DL (ref 0.76–1.27)
EGFRCR SERPLBLD CKD-EPI 2021: 107.8 ML/MIN/1.73
GLOBULIN UR ELPH-MCNC: 2.2 GM/DL
GLUCOSE SERPL-MCNC: 85 MG/DL (ref 65–99)
HDLC SERPL-MCNC: 30 MG/DL (ref 40–60)
LDLC SERPL CALC-MCNC: 161 MG/DL (ref 0–100)
LDLC/HDLC SERPL: 5.29 {RATIO}
POTASSIUM SERPL-SCNC: 4.2 MMOL/L (ref 3.5–5.2)
PROT SERPL-MCNC: 6.5 G/DL (ref 6–8.5)
SODIUM SERPL-SCNC: 140 MMOL/L (ref 136–145)
TRIGL SERPL-MCNC: 156 MG/DL (ref 0–150)
VLDLC SERPL-MCNC: 29 MG/DL (ref 5–40)

## 2022-11-21 PROCEDURE — 36415 COLL VENOUS BLD VENIPUNCTURE: CPT

## 2022-11-21 PROCEDURE — 80061 LIPID PANEL: CPT | Performed by: NURSE PRACTITIONER

## 2022-11-21 PROCEDURE — 82306 VITAMIN D 25 HYDROXY: CPT | Performed by: NURSE PRACTITIONER

## 2022-11-21 PROCEDURE — 80053 COMPREHEN METABOLIC PANEL: CPT | Performed by: NURSE PRACTITIONER

## 2022-11-22 DIAGNOSIS — E55.9 VITAMIN D DEFICIENCY: Primary | ICD-10-CM

## 2022-11-22 RX ORDER — ERGOCALCIFEROL 1.25 MG/1
50000 CAPSULE ORAL WEEKLY
Qty: 12 CAPSULE | Refills: 1 | Status: SHIPPED | OUTPATIENT
Start: 2022-11-22

## 2022-12-08 ENCOUNTER — HOSPITAL ENCOUNTER (EMERGENCY)
Facility: HOSPITAL | Age: 42
Discharge: HOME | End: 2022-12-08
Attending: EMERGENCY MEDICINE | Admitting: EMERGENCY MEDICINE

## 2022-12-08 VITALS
HEIGHT: 69 IN | RESPIRATION RATE: 18 BRPM | SYSTOLIC BLOOD PRESSURE: 137 MMHG | HEART RATE: 95 BPM | BODY MASS INDEX: 30.36 KG/M2 | DIASTOLIC BLOOD PRESSURE: 84 MMHG | OXYGEN SATURATION: 99 % | TEMPERATURE: 97.9 F | WEIGHT: 205 LBS

## 2022-12-08 DIAGNOSIS — S61.412A LACERATION OF LEFT HAND WITHOUT FOREIGN BODY, INITIAL ENCOUNTER: Primary | ICD-10-CM

## 2022-12-08 PROCEDURE — 99282 EMERGENCY DEPT VISIT SF MDM: CPT

## 2022-12-08 NOTE — ED PROVIDER NOTES
Subjective    Provider in Triage Note  Patient is a 42-year-old male who presents ambulatory to the ER with complaints of a laceration to his left thumb after he hit it on the corner of her frame.  He states it did not break and there is no foreign bodies.  He denies any weakness or paresthesias.  Had a tetanus shot approximately 6 months ago.      History of Present Illness    Review of Systems   Constitutional: Negative for fever.   Musculoskeletal: Positive for myalgias.   Skin: Positive for wound.   Allergic/Immunologic: Negative for immunocompromised state.   Neurological: Negative for weakness.   Hematological: Does not bruise/bleed easily.       Past Medical History:   Diagnosis Date   • Hyperlipidemia    • Hypertension        No Known Allergies    Past Surgical History:   Procedure Laterality Date   • ENDOSCOPY N/A 9/22/2022    Procedure: ESOPHAGOGASTRODUODENOSCOPY with biopsy x2 areas;  Surgeon: Kendrick Meredith MD;  Location: [facility];  Service: Gastroenterology;  Laterality: N/A;  post: gastritis, hiatal hernia, esophagitis   • HERNIA REPAIR         Family History   Problem Relation Age of Onset   • Psoriasis Father    • Heart disease Maternal Grandmother    • Heart disease Paternal Grandmother    • Diabetes Paternal Grandmother    • Alzheimer's disease Paternal Grandmother    • Hypertension Brother    • Bipolar disorder Mother        Social History     Socioeconomic History   • Marital status: Single   Tobacco Use   • Smoking status: Every Day     Packs/day: 2.00     Types: Cigarettes   • Smokeless tobacco: Never   Vaping Use   • Vaping Use: Never used   Substance and Sexual Activity   • Alcohol use: Yes     Comment: socially   • Drug use: Yes     Frequency: 7.0 times per week     Types: Marijuana     Comment: daily   • Sexual activity: Defer           Objective   Physical Exam     Evaluated in triage  Vital signs and traige nurse note reviewed.  Constitutional:  Awake, alert; well developed and  well nourished.  No acute distress,   HEENT:  Normocephalic, atraumatic;  with intact EOM; oropharynx is pink and moist without edema or erythema.  Neck: Supple, full range of motion without pain;   Cardiovascular: Regular rate and rhythm,    Pulmonary: Respiratory effort regular, nonlabored;   Musculoskeletal: T shaped 1.5 cm laceration noted to the left proximal thumb.  FDP and FDS are intact.  No snuffbox tenderness.  No active bleeding.  Distal neurovascular and sensorimotor intact  Neuro: Alert oriented x3, speech is clear and appropriate.      Laceration Repair    Date/Time: 12/8/2022 1:30 AM  Performed by: Pamela Allen APRN  Authorized by: Pamela Allen APRN     Consent:     Consent obtained:  Verbal    Consent given by:  Patient    Risks, benefits, and alternatives were discussed: yes    Universal protocol:     Procedure explained and questions answered to patient or proxy's satisfaction: yes      Site/side marked: yes      Immediately prior to procedure, a time out was called: yes      Patient identity confirmed:  Verbally with patient  Anesthesia:     Anesthesia method:  Local infiltration    Local anesthetic:  Lidocaine 1% w/o epi  Laceration details:     Location:  Hand    Length (cm):  1  Pre-procedure details:     Preparation:  Patient was prepped and draped in usual sterile fashion  Exploration:     Wound exploration: wound explored through full range of motion and entire depth of wound visualized    Skin repair:     Repair method:  Sutures    Suture size:  5-0    Suture material:  Nylon    Suture technique:  Simple interrupted    Number of sutures:  2  Repair type:     Repair type:  Simple  Post-procedure details:     Procedure completion:  Tolerated               ED Course  ED Course as of 12/08/22 0340   Thu Dec 08, 2022   0133 Placed in a room from triage   [JW]      ED Course User Index  [JW] Pamela Allen APRN          Labs Reviewed - No data to display  Medications - No data to  "display  No radiology results for the last day  Prior to Admission medications    Medication Sig Start Date End Date Taking? Authorizing Provider   vitamin D (ERGOCALCIFEROL) 1.25 MG (20808 UT) capsule capsule Take 1 capsule by mouth 1 (One) Time Per Week. 11/22/22   Keisha Wilde, APRN                                      MDM  Number of Diagnoses or Management Options  Laceration of left hand without foreign body, initial encounter  Diagnosis management comments: /84   Pulse 95   Temp 97.9 °F (36.6 °C)   Resp 18   Ht 175.3 cm (69\")   Wt 93 kg (205 lb)   SpO2 99%   BMI 30.27 kg/m²         Comorbidities:  has a past medical history of Hyperlipidemia and Hypertension.  Differentials: Tendon ligament laceration felt unlikely based on physical exam not all inclusive of differentials considered  Radiology interpretation:  Not felt to be emergently warranted    Appropriate PPE worn during exam.    i discussed findings with patient significant other who voices understanding of discharge instructions, signs and symptoms requiring return to ED; discharged improved and in stable condition with follow up for re-evaluation.  This document is intended for medical expert use only. Reading of this document by patients and/or patient's family without participating medical staff guidance may result in misinterpretation and unintended morbidity.  Any interpretation of such data is the responsibility of the patient and/or family member responsible for the patient in concert with their primary or specialist providers, not to be left for sources of online searches such as [employer] or similar queries. Relying on these approaches to knowledge may result in misinterpretation, misguided goals of care and even death should patients or family members try recommendations outside of the realm of professional medical care in a supervised inpatient environment.         Final diagnoses:   Laceration of left hand without " foreign body, initial encounter       ED Disposition  ED Disposition     ED Disposition   Discharge    Condition   Stable    Comment   --             Keisha Wilde, APRN  [address]  [place] [address]  [phone]    Schedule an appointment as soon as possible for a visit   For suture removal 7-10 days         Medication List      No changes were made to your prescriptions during this visit.          Pamela Allen, APRN  12/08/22 0340

## 2022-12-08 NOTE — DISCHARGE INSTRUCTIONS
Wash wounds twice a day with soap and water.  Tylenol as needed.  Follow-up with your family doctor for suture removal.  Watch for signs of infection.  Return for any new or worsening symptoms

## 2022-12-14 ENCOUNTER — OFFICE (OUTPATIENT)
Dept: URBAN - METROPOLITAN AREA CLINIC 64 | Facility: CLINIC | Age: 42
End: 2022-12-14

## 2022-12-14 VITALS
SYSTOLIC BLOOD PRESSURE: 136 MMHG | HEIGHT: 69 IN | HEART RATE: 86 BPM | DIASTOLIC BLOOD PRESSURE: 87 MMHG | WEIGHT: 203 LBS

## 2022-12-14 DIAGNOSIS — K59.00 CONSTIPATION, UNSPECIFIED: ICD-10-CM

## 2022-12-14 DIAGNOSIS — K62.5 HEMORRHAGE OF ANUS AND RECTUM: ICD-10-CM

## 2022-12-14 DIAGNOSIS — K21.00 GASTRO-ESOPHAGEAL REFLUX DISEASE WITH ESOPHAGITIS, WITHOUT B: ICD-10-CM

## 2022-12-14 PROCEDURE — 99214 OFFICE O/P EST MOD 30 MIN: CPT | Performed by: INTERNAL MEDICINE

## 2022-12-14 RX ORDER — POLYETHYLENE GLYCOL 3350 17 G/17G
POWDER, FOR SOLUTION ORAL
Qty: 525 | Refills: 2 | Status: ACTIVE
Start: 2022-12-14

## 2022-12-14 RX ORDER — HYDROCORTISONE ACETATE 25 MG/1
SUPPOSITORY RECTAL
Qty: 20 | Refills: 1 | Status: COMPLETED
Start: 2022-12-14 | End: 2023-01-27

## 2022-12-14 RX ORDER — OMEPRAZOLE 40 MG/1
CAPSULE, DELAYED RELEASE ORAL
Qty: 90 | Refills: 0 | Status: ACTIVE

## 2023-01-27 ENCOUNTER — ON CAMPUS - OUTPATIENT (OUTPATIENT)
Dept: URBAN - METROPOLITAN AREA HOSPITAL 2 | Facility: HOSPITAL | Age: 43
End: 2023-01-27

## 2023-01-27 ENCOUNTER — OFFICE (OUTPATIENT)
Dept: URBAN - METROPOLITAN AREA PATHOLOGY 4 | Facility: PATHOLOGY | Age: 43
End: 2023-01-27
Payer: COMMERCIAL

## 2023-01-27 ENCOUNTER — OFFICE (OUTPATIENT)
Dept: URBAN - METROPOLITAN AREA PATHOLOGY 4 | Facility: PATHOLOGY | Age: 43
End: 2023-01-27

## 2023-01-27 VITALS
HEART RATE: 100 BPM | SYSTOLIC BLOOD PRESSURE: 107 MMHG | DIASTOLIC BLOOD PRESSURE: 80 MMHG | HEART RATE: 82 BPM | SYSTOLIC BLOOD PRESSURE: 134 MMHG | TEMPERATURE: 97.7 F | HEART RATE: 96 BPM | DIASTOLIC BLOOD PRESSURE: 85 MMHG | WEIGHT: 194 LBS | HEART RATE: 108 BPM | HEART RATE: 87 BPM | DIASTOLIC BLOOD PRESSURE: 79 MMHG | HEART RATE: 84 BPM | SYSTOLIC BLOOD PRESSURE: 163 MMHG | SYSTOLIC BLOOD PRESSURE: 111 MMHG | RESPIRATION RATE: 16 BRPM | DIASTOLIC BLOOD PRESSURE: 98 MMHG | SYSTOLIC BLOOD PRESSURE: 121 MMHG | DIASTOLIC BLOOD PRESSURE: 84 MMHG | SYSTOLIC BLOOD PRESSURE: 126 MMHG | DIASTOLIC BLOOD PRESSURE: 90 MMHG | OXYGEN SATURATION: 100 % | HEART RATE: 75 BPM | OXYGEN SATURATION: 99 % | DIASTOLIC BLOOD PRESSURE: 83 MMHG | OXYGEN SATURATION: 98 % | SYSTOLIC BLOOD PRESSURE: 120 MMHG | SYSTOLIC BLOOD PRESSURE: 123 MMHG | HEIGHT: 69 IN | RESPIRATION RATE: 18 BRPM | HEART RATE: 95 BPM | DIASTOLIC BLOOD PRESSURE: 78 MMHG | HEART RATE: 83 BPM | SYSTOLIC BLOOD PRESSURE: 113 MMHG | DIASTOLIC BLOOD PRESSURE: 89 MMHG | OXYGEN SATURATION: 95 % | OXYGEN SATURATION: 97 %

## 2023-01-27 DIAGNOSIS — K63.5 POLYP OF COLON: ICD-10-CM

## 2023-01-27 DIAGNOSIS — K21.9 GASTRO-ESOPHAGEAL REFLUX DISEASE WITHOUT ESOPHAGITIS: ICD-10-CM

## 2023-01-27 DIAGNOSIS — K62.5 HEMORRHAGE OF ANUS AND RECTUM: ICD-10-CM

## 2023-01-27 DIAGNOSIS — K57.30 DIVERTICULOSIS OF LARGE INTESTINE WITHOUT PERFORATION OR ABS: ICD-10-CM

## 2023-01-27 DIAGNOSIS — K31.89 OTHER DISEASES OF STOMACH AND DUODENUM: ICD-10-CM

## 2023-01-27 DIAGNOSIS — K44.9 DIAPHRAGMATIC HERNIA WITHOUT OBSTRUCTION OR GANGRENE: ICD-10-CM

## 2023-01-27 DIAGNOSIS — D12.5 BENIGN NEOPLASM OF SIGMOID COLON: ICD-10-CM

## 2023-01-27 DIAGNOSIS — K64.2 THIRD DEGREE HEMORRHOIDS: ICD-10-CM

## 2023-01-27 LAB
GI HISTOLOGY: A. SELECT: (no result)
GI HISTOLOGY: B. UNSPECIFIED: (no result)
GI HISTOLOGY: PDF REPORT: (no result)

## 2023-01-27 PROCEDURE — 43239 EGD BIOPSY SINGLE/MULTIPLE: CPT | Performed by: INTERNAL MEDICINE

## 2023-01-27 PROCEDURE — 45385 COLONOSCOPY W/LESION REMOVAL: CPT | Performed by: INTERNAL MEDICINE

## 2023-01-27 PROCEDURE — 88305 TISSUE EXAM BY PATHOLOGIST: CPT | Mod: 26 | Performed by: INTERNAL MEDICINE

## 2023-01-27 RX ORDER — HYDROCORTISONE 25 MG/G
5 CREAM TOPICAL
Qty: 1 | Refills: 1 | Status: ACTIVE
Start: 2023-01-27

## 2023-10-09 ENCOUNTER — LAB (OUTPATIENT)
Dept: FAMILY MEDICINE CLINIC | Facility: CLINIC | Age: 43
End: 2023-10-09
Payer: COMMERCIAL

## 2023-10-09 ENCOUNTER — OFFICE VISIT (OUTPATIENT)
Dept: FAMILY MEDICINE CLINIC | Facility: CLINIC | Age: 43
End: 2023-10-09
Payer: COMMERCIAL

## 2023-10-09 VITALS
HEART RATE: 101 BPM | WEIGHT: 192 LBS | BODY MASS INDEX: 29.1 KG/M2 | HEIGHT: 68 IN | DIASTOLIC BLOOD PRESSURE: 86 MMHG | OXYGEN SATURATION: 97 % | SYSTOLIC BLOOD PRESSURE: 134 MMHG

## 2023-10-09 DIAGNOSIS — E78.2 MIXED HYPERLIPIDEMIA: ICD-10-CM

## 2023-10-09 DIAGNOSIS — I10 PRIMARY HYPERTENSION: Primary | ICD-10-CM

## 2023-10-09 DIAGNOSIS — E55.9 VITAMIN D DEFICIENCY: ICD-10-CM

## 2023-10-09 DIAGNOSIS — Z87.891 PERSONAL HISTORY OF NICOTINE DEPENDENCE: ICD-10-CM

## 2023-10-09 DIAGNOSIS — K21.9 GASTROESOPHAGEAL REFLUX DISEASE, UNSPECIFIED WHETHER ESOPHAGITIS PRESENT: ICD-10-CM

## 2023-10-09 DIAGNOSIS — I10 PRIMARY HYPERTENSION: ICD-10-CM

## 2023-10-09 LAB
25(OH)D3 SERPL-MCNC: 31.1 NG/ML (ref 30–100)
ALBUMIN SERPL-MCNC: 4.6 G/DL (ref 3.5–5.2)
ALBUMIN/GLOB SERPL: 1.8 G/DL
ALP SERPL-CCNC: 74 U/L (ref 39–117)
ALT SERPL W P-5'-P-CCNC: 18 U/L (ref 1–41)
ANION GAP SERPL CALCULATED.3IONS-SCNC: 12.9 MMOL/L (ref 5–15)
AST SERPL-CCNC: 16 U/L (ref 1–40)
BILIRUB SERPL-MCNC: 0.9 MG/DL (ref 0–1.2)
BUN SERPL-MCNC: 11 MG/DL (ref 6–20)
BUN/CREAT SERPL: 10.8 (ref 7–25)
CALCIUM SPEC-SCNC: 9.6 MG/DL (ref 8.6–10.5)
CHLORIDE SERPL-SCNC: 104 MMOL/L (ref 98–107)
CHOLEST SERPL-MCNC: 207 MG/DL (ref 0–200)
CO2 SERPL-SCNC: 23.1 MMOL/L (ref 22–29)
CREAT SERPL-MCNC: 1.02 MG/DL (ref 0.76–1.27)
EGFRCR SERPLBLD CKD-EPI 2021: 94.7 ML/MIN/1.73
GLOBULIN UR ELPH-MCNC: 2.6 GM/DL
GLUCOSE SERPL-MCNC: 83 MG/DL (ref 65–99)
HCV AB SER DONR QL: NORMAL
HDLC SERPL-MCNC: 31 MG/DL (ref 40–60)
LDLC SERPL CALC-MCNC: 153 MG/DL (ref 0–100)
LDLC/HDLC SERPL: 4.85 {RATIO}
POTASSIUM SERPL-SCNC: 3.9 MMOL/L (ref 3.5–5.2)
PROT SERPL-MCNC: 7.2 G/DL (ref 6–8.5)
SODIUM SERPL-SCNC: 140 MMOL/L (ref 136–145)
TRIGL SERPL-MCNC: 128 MG/DL (ref 0–150)
VLDLC SERPL-MCNC: 23 MG/DL (ref 5–40)

## 2023-10-09 PROCEDURE — 99214 OFFICE O/P EST MOD 30 MIN: CPT | Performed by: NURSE PRACTITIONER

## 2023-10-09 PROCEDURE — 36415 COLL VENOUS BLD VENIPUNCTURE: CPT

## 2023-10-09 PROCEDURE — 82306 VITAMIN D 25 HYDROXY: CPT | Performed by: NURSE PRACTITIONER

## 2023-10-09 PROCEDURE — 80053 COMPREHEN METABOLIC PANEL: CPT | Performed by: NURSE PRACTITIONER

## 2023-10-09 PROCEDURE — 80061 LIPID PANEL: CPT | Performed by: NURSE PRACTITIONER

## 2023-10-09 PROCEDURE — 86803 HEPATITIS C AB TEST: CPT | Performed by: NURSE PRACTITIONER

## 2023-10-09 RX ORDER — ATORVASTATIN CALCIUM 10 MG/1
10 TABLET, FILM COATED ORAL NIGHTLY
Qty: 90 TABLET | Refills: 1 | Status: SHIPPED | OUTPATIENT
Start: 2023-10-09

## 2023-10-09 RX ORDER — ERGOCALCIFEROL 1.25 MG/1
50000 CAPSULE ORAL WEEKLY
Qty: 12 CAPSULE | Refills: 1 | Status: SHIPPED | OUTPATIENT
Start: 2023-10-09

## 2023-10-09 RX ORDER — VARENICLINE TARTRATE 0.5 MG/1
0.5 TABLET, FILM COATED ORAL 2 TIMES DAILY
Qty: 60 TABLET | Refills: 0 | Status: SHIPPED | OUTPATIENT
Start: 2023-10-09

## 2023-10-09 RX ORDER — LISINOPRIL 5 MG/1
5 TABLET ORAL DAILY
Qty: 90 TABLET | Refills: 1 | Status: SHIPPED | OUTPATIENT
Start: 2023-10-09

## 2023-10-09 NOTE — PROGRESS NOTES
Subjective   Chivo Billy is a 43 y.o. male.       HPI   Pt is here today for follow up.  Reports he has been out of all meds a couple of weeks.   1) HTN - currently on  lisinopril 5 mg daily.  Denies any Cp; palpitations; SOA; dizziness; headache; trouble with vision.   2) Hyperlipidemia -  currently on atorvastatin 10 mg daily.      3) GERD- currently on omeprazole 40 mg daily.  Symptoms stable.    4) Vitamin D def- currently on a weekly supplement.   5) Interested in something to help with smoking cessation.     The following portions of the patient's history were reviewed and updated as appropriate: allergies, current medications, past family history, past medical history, past social history, past surgical history, and problem list.    Review of Systems   Constitutional:  Negative for appetite change, fatigue and fever.   Respiratory:  Negative for cough and shortness of breath.    Cardiovascular:  Negative for chest pain and palpitations.   Gastrointestinal:  Negative for diarrhea, nausea and vomiting.   Genitourinary:  Negative for dysuria, frequency and urgency.   Neurological:  Negative for dizziness, weakness, light-headedness and headache.   Psychiatric/Behavioral:  Negative for depressed mood. The patient is not nervous/anxious.        Objective   Physical Exam  Vitals reviewed.   Constitutional:       General: He is not in acute distress.     Appearance: Normal appearance.   Cardiovascular:      Rate and Rhythm: Normal rate and regular rhythm.      Pulses: Normal pulses.      Heart sounds: Normal heart sounds. No murmur heard.  Pulmonary:      Effort: Pulmonary effort is normal. No respiratory distress.      Breath sounds: Normal breath sounds. No wheezing or rhonchi.   Chest:      Chest wall: No tenderness.   Abdominal:      Tenderness: There is no right CVA tenderness or left CVA tenderness.   Musculoskeletal:      Right lower leg: No edema.      Left lower leg: No edema.   Skin:     General: Skin is  warm and dry.      Findings: No erythema.   Neurological:      General: No focal deficit present.      Mental Status: He is alert and oriented to person, place, and time.   Psychiatric:         Mood and Affect: Mood normal.         BMI is >= 30 and <35. (Class 1 Obesity). The following options were offered after discussion;: exercise counseling/recommendations and nutrition counseling/recommendations       Assessment & Plan   Diagnoses and all orders for this visit:    1. Primary hypertension (Primary)  Comments:  Stable.   Cont. current medication.   Labs ordered.   RTO in 6 mo.  Orders:  -     lisinopril (PRINIVIL,ZESTRIL) 5 MG tablet; Take 1 tablet by mouth Daily.  Dispense: 90 tablet; Refill: 1  -     Comprehensive metabolic panel; Future  -     Lipid panel; Future  -     Hepatitis C antibody; Future    2. Mixed hyperlipidemia  Comments:  Stable.   Cont. current medication.   Labs ordered.   RTO in 6 mo.  Orders:  -     atorvastatin (LIPITOR) 10 MG tablet; Take 1 tablet by mouth Every Night.  Dispense: 90 tablet; Refill: 1  -     Comprehensive metabolic panel; Future  -     Lipid panel; Future  -     Hepatitis C antibody; Future    3. Vitamin D deficiency  Comments:  Stable.   Cont. current medication.   Labs ordered.   RTO in 6 mo.  Orders:  -     vitamin D (ERGOCALCIFEROL) 1.25 MG (39076 UT) capsule capsule; Take 1 capsule by mouth 1 (One) Time Per Week.  Dispense: 12 capsule; Refill: 1  -     Vitamin D 25 hydroxy; Future    4. Gastroesophageal reflux disease, unspecified whether esophagitis present  Comments:  Stable.   Cont. current medication.   Labs ordered.   RTO in 6 mo.    5. Personal history of nicotine dependence  Comments:  Given Chantix starter pack.  Orders:  -     varenicline (Chantix) 0.5 MG tablet; Take 1 tablet by mouth 2 (Two) Times a Day.  Dispense: 60 tablet; Refill: 0

## 2023-11-20 DIAGNOSIS — Z87.891 PERSONAL HISTORY OF NICOTINE DEPENDENCE: Primary | ICD-10-CM

## 2023-11-20 DIAGNOSIS — Z87.891 PERSONAL HISTORY OF NICOTINE DEPENDENCE: ICD-10-CM

## 2023-11-20 RX ORDER — VARENICLINE TARTRATE 0.5 MG/1
0.5 TABLET, FILM COATED ORAL 2 TIMES DAILY
Qty: 60 TABLET | Refills: 0 | OUTPATIENT
Start: 2023-11-20

## 2023-11-20 RX ORDER — VARENICLINE TARTRATE 1 MG/1
1 TABLET, FILM COATED ORAL 2 TIMES DAILY
Qty: 60 TABLET | Refills: 1 | Status: SHIPPED | OUTPATIENT
Start: 2023-11-20

## 2024-04-08 NOTE — PROGRESS NOTES
Subjective   Chivo Billy is a 44 y.o. male.       HPI   Pt is here today for 6 month follow up.   1) HTN - currently on  lisinopril 5 mg daily.  Denies any Cp; palpitations; SOA; dizziness;  trouble with vision. /100 today.  Has had intermittent headache at home.  Daily smoker; has tried to quit in the past but not successful yet.   2) Hyperlipidemia -  currently on atorvastatin 10 mg daily.      3) GERD- currently on omeprazole 40 mg daily.  This past week has taken two tabs daily because reflux has worsened.    4) Vitamin D def- currently on a weekly supplement.   5) Cysts on back and side; has had for years.  May be growing.  Would like to see about removal.     The following portions of the patient's history were reviewed and updated as appropriate: allergies, current medications, past family history, past medical history, past social history, past surgical history, and problem list.    Review of Systems   Constitutional:  Negative for chills, fatigue and fever.   Respiratory:  Negative for cough, shortness of breath and wheezing.    Cardiovascular:  Negative for chest pain and palpitations.   Gastrointestinal:  Positive for GERD. Negative for abdominal pain, blood in stool, constipation, nausea, vomiting and indigestion.   Genitourinary:  Negative for dysuria, frequency, hematuria and urgency.   Skin:  Positive for skin lesions.   Neurological:  Negative for dizziness, weakness, light-headedness and headache.   Psychiatric/Behavioral:  Negative for depressed mood. The patient is not nervous/anxious.        Objective   Physical Exam  Vitals reviewed.   Constitutional:       General: He is not in acute distress.     Appearance: Normal appearance. He is obese.   Cardiovascular:      Rate and Rhythm: Normal rate and regular rhythm.      Pulses: Normal pulses.      Heart sounds: Normal heart sounds. No murmur heard.  Pulmonary:      Effort: Pulmonary effort is normal. No respiratory distress.      Breath  sounds: Normal breath sounds. No wheezing or rhonchi.   Chest:      Chest wall: No tenderness.   Abdominal:      Tenderness: There is no right CVA tenderness or left CVA tenderness.   Skin:     General: Skin is warm and dry.      Findings: Lesion (freely moveable nodule on right side and left back; no erythema or warmth) present.   Neurological:      General: No focal deficit present.      Mental Status: He is alert and oriented to person, place, and time.   Psychiatric:         Mood and Affect: Mood normal.                  Procedures   Assessment & Plan   Diagnoses and all orders for this visit:    1. Essential hypertension (Primary)  Comments:  Increase lisinopril to 10 mg daily.   Monitor BP at home; call in readings in one week.   Labs ordered.   Encouraged smoking cessation.   RTO in 6 mo.  Orders:  -     Comprehensive metabolic panel; Future  -     Lipid panel; Future  -     lisinopril (PRINIVIL,ZESTRIL) 10 MG tablet; Take 1 tablet by mouth Daily.  Dispense: 30 tablet; Refill: 1  -     Hemoglobin A1c; Future    2. Mixed hyperlipidemia  Comments:  Stable.   Cont. current medication.   Labs ordered.   RTO in 6 mo.  Orders:  -     Comprehensive metabolic panel; Future  -     Lipid panel; Future  -     Hemoglobin A1c; Future    3. Gastroesophageal reflux disease, unspecified whether esophagitis present  Comments:  Will switch from omeprazole to pantoprazole 40 mg daily.    Reviewed trigger foods.  Orders:  -     pantoprazole (Protonix) 40 MG EC tablet; Take 1 tablet by mouth Daily.  Dispense: 30 tablet; Refill: 1    4. Vitamin D deficiency  Comments:  Stable.   Cont. current medication.   Labs ordered.   RTO in 6 mo.  Orders:  -     Vitamin D 25 hydroxy; Future    5. Lipoma of torso  Comments:  Referral to derm  Orders:  -     Ambulatory Referral to Dermatology      Work on a healthy diet; aim for 150 min exercise weekly and weight loss.

## 2024-04-09 ENCOUNTER — LAB (OUTPATIENT)
Dept: FAMILY MEDICINE CLINIC | Facility: CLINIC | Age: 44
End: 2024-04-09
Payer: COMMERCIAL

## 2024-04-09 ENCOUNTER — OFFICE VISIT (OUTPATIENT)
Dept: FAMILY MEDICINE CLINIC | Facility: CLINIC | Age: 44
End: 2024-04-09
Payer: COMMERCIAL

## 2024-04-09 VITALS
WEIGHT: 210 LBS | SYSTOLIC BLOOD PRESSURE: 147 MMHG | HEART RATE: 82 BPM | HEIGHT: 68 IN | DIASTOLIC BLOOD PRESSURE: 100 MMHG | OXYGEN SATURATION: 99 % | BODY MASS INDEX: 31.83 KG/M2

## 2024-04-09 DIAGNOSIS — E55.9 VITAMIN D DEFICIENCY: ICD-10-CM

## 2024-04-09 DIAGNOSIS — E78.2 MIXED HYPERLIPIDEMIA: ICD-10-CM

## 2024-04-09 DIAGNOSIS — D17.1 LIPOMA OF TORSO: ICD-10-CM

## 2024-04-09 DIAGNOSIS — I10 ESSENTIAL HYPERTENSION: Primary | ICD-10-CM

## 2024-04-09 DIAGNOSIS — K21.9 GASTROESOPHAGEAL REFLUX DISEASE, UNSPECIFIED WHETHER ESOPHAGITIS PRESENT: ICD-10-CM

## 2024-04-09 DIAGNOSIS — I10 ESSENTIAL HYPERTENSION: ICD-10-CM

## 2024-04-09 PROCEDURE — 80061 LIPID PANEL: CPT | Performed by: NURSE PRACTITIONER

## 2024-04-09 PROCEDURE — 82306 VITAMIN D 25 HYDROXY: CPT | Performed by: NURSE PRACTITIONER

## 2024-04-09 PROCEDURE — 83036 HEMOGLOBIN GLYCOSYLATED A1C: CPT | Performed by: NURSE PRACTITIONER

## 2024-04-09 PROCEDURE — 36415 COLL VENOUS BLD VENIPUNCTURE: CPT

## 2024-04-09 PROCEDURE — 80053 COMPREHEN METABOLIC PANEL: CPT | Performed by: NURSE PRACTITIONER

## 2024-04-09 PROCEDURE — 99214 OFFICE O/P EST MOD 30 MIN: CPT | Performed by: NURSE PRACTITIONER

## 2024-04-09 RX ORDER — PANTOPRAZOLE SODIUM 40 MG/1
40 TABLET, DELAYED RELEASE ORAL DAILY
Qty: 30 TABLET | Refills: 1 | Status: SHIPPED | OUTPATIENT
Start: 2024-04-09

## 2024-04-09 RX ORDER — LISINOPRIL 10 MG/1
10 TABLET ORAL DAILY
Qty: 30 TABLET | Refills: 1 | Status: SHIPPED | OUTPATIENT
Start: 2024-04-09

## 2024-04-10 DIAGNOSIS — E78.2 MIXED HYPERLIPIDEMIA: Primary | ICD-10-CM

## 2024-04-10 LAB
25(OH)D3 SERPL-MCNC: 53.2 NG/ML (ref 30–100)
ALBUMIN SERPL-MCNC: 4.4 G/DL (ref 3.5–5.2)
ALBUMIN/GLOB SERPL: 1.7 G/DL
ALP SERPL-CCNC: 76 U/L (ref 39–117)
ALT SERPL W P-5'-P-CCNC: 23 U/L (ref 1–41)
ANION GAP SERPL CALCULATED.3IONS-SCNC: 11.6 MMOL/L (ref 5–15)
AST SERPL-CCNC: 13 U/L (ref 1–40)
BILIRUB SERPL-MCNC: 0.4 MG/DL (ref 0–1.2)
BUN SERPL-MCNC: 9 MG/DL (ref 6–20)
BUN/CREAT SERPL: 9.5 (ref 7–25)
CALCIUM SPEC-SCNC: 9.1 MG/DL (ref 8.6–10.5)
CHLORIDE SERPL-SCNC: 106 MMOL/L (ref 98–107)
CHOLEST SERPL-MCNC: 245 MG/DL (ref 0–200)
CO2 SERPL-SCNC: 24.4 MMOL/L (ref 22–29)
CREAT SERPL-MCNC: 0.95 MG/DL (ref 0.76–1.27)
EGFRCR SERPLBLD CKD-EPI 2021: 102.5 ML/MIN/1.73
GLOBULIN UR ELPH-MCNC: 2.6 GM/DL
GLUCOSE SERPL-MCNC: 81 MG/DL (ref 65–99)
HBA1C MFR BLD: 5.6 % (ref 4.8–5.6)
HDLC SERPL-MCNC: 31 MG/DL (ref 40–60)
LDLC SERPL CALC-MCNC: 181 MG/DL (ref 0–100)
LDLC/HDLC SERPL: 5.77 {RATIO}
POTASSIUM SERPL-SCNC: 3.7 MMOL/L (ref 3.5–5.2)
PROT SERPL-MCNC: 7 G/DL (ref 6–8.5)
SODIUM SERPL-SCNC: 142 MMOL/L (ref 136–145)
TRIGL SERPL-MCNC: 176 MG/DL (ref 0–150)
VLDLC SERPL-MCNC: 33 MG/DL (ref 5–40)

## 2024-04-10 RX ORDER — ATORVASTATIN CALCIUM 20 MG/1
20 TABLET, FILM COATED ORAL DAILY
Qty: 90 TABLET | Refills: 1 | Status: SHIPPED | OUTPATIENT
Start: 2024-04-10

## 2024-04-18 DIAGNOSIS — E55.9 VITAMIN D DEFICIENCY: ICD-10-CM

## 2024-04-18 RX ORDER — ERGOCALCIFEROL 1.25 MG/1
50000 CAPSULE ORAL WEEKLY
Qty: 12 CAPSULE | Refills: 1 | Status: SHIPPED | OUTPATIENT
Start: 2024-04-18

## 2024-05-13 DIAGNOSIS — K21.9 GASTROESOPHAGEAL REFLUX DISEASE, UNSPECIFIED WHETHER ESOPHAGITIS PRESENT: Primary | ICD-10-CM

## 2024-05-13 RX ORDER — ESOMEPRAZOLE MAGNESIUM 40 MG/1
40 CAPSULE, DELAYED RELEASE ORAL
Qty: 30 CAPSULE | Refills: 3 | Status: SHIPPED | OUTPATIENT
Start: 2024-05-13

## 2024-08-20 DIAGNOSIS — I10 ESSENTIAL HYPERTENSION: ICD-10-CM

## 2024-08-20 RX ORDER — LISINOPRIL 10 MG/1
10 TABLET ORAL DAILY
Qty: 90 TABLET | Refills: 1 | Status: SHIPPED | OUTPATIENT
Start: 2024-08-20

## 2024-09-27 DIAGNOSIS — K21.9 GASTROESOPHAGEAL REFLUX DISEASE, UNSPECIFIED WHETHER ESOPHAGITIS PRESENT: ICD-10-CM

## 2024-09-27 RX ORDER — ESOMEPRAZOLE MAGNESIUM 40 MG/1
40 CAPSULE, DELAYED RELEASE ORAL
Qty: 30 CAPSULE | Refills: 3 | Status: SHIPPED | OUTPATIENT
Start: 2024-09-27 | End: 2024-09-28

## 2024-09-28 ENCOUNTER — HOSPITAL ENCOUNTER (EMERGENCY)
Facility: HOSPITAL | Age: 44
Discharge: HOME | End: 2024-09-28
Payer: COMMERCIAL

## 2024-09-28 VITALS
WEIGHT: 196.21 LBS | OXYGEN SATURATION: 95 % | BODY MASS INDEX: 29.74 KG/M2 | DIASTOLIC BLOOD PRESSURE: 66 MMHG | HEIGHT: 68 IN | TEMPERATURE: 98.2 F | HEART RATE: 58 BPM | RESPIRATION RATE: 20 BRPM | SYSTOLIC BLOOD PRESSURE: 136 MMHG

## 2024-09-28 DIAGNOSIS — K21.9 GASTROESOPHAGEAL REFLUX DISEASE, UNSPECIFIED WHETHER ESOPHAGITIS PRESENT: ICD-10-CM

## 2024-09-28 DIAGNOSIS — K21.9 GASTROESOPHAGEAL REFLUX DISEASE WITHOUT ESOPHAGITIS: Primary | ICD-10-CM

## 2024-09-28 LAB
ALBUMIN SERPL-MCNC: 4.6 G/DL (ref 3.5–5.2)
ALBUMIN/GLOB SERPL: 1.8 G/DL
ALP SERPL-CCNC: 81 U/L (ref 39–117)
ALT SERPL W P-5'-P-CCNC: 13 U/L (ref 1–41)
ANION GAP SERPL CALCULATED.3IONS-SCNC: 12.4 MMOL/L (ref 5–15)
AST SERPL-CCNC: 18 U/L (ref 1–40)
BASOPHILS # BLD AUTO: 0.01 10*3/MM3 (ref 0–0.2)
BASOPHILS NFR BLD AUTO: 0.1 % (ref 0–1.5)
BILIRUB SERPL-MCNC: 0.6 MG/DL (ref 0–1.2)
BUN SERPL-MCNC: 12 MG/DL (ref 6–20)
BUN/CREAT SERPL: 12.2 (ref 7–25)
CALCIUM SPEC-SCNC: 9.8 MG/DL (ref 8.6–10.5)
CHLORIDE SERPL-SCNC: 99 MMOL/L (ref 98–107)
CO2 SERPL-SCNC: 24.6 MMOL/L (ref 22–29)
CREAT SERPL-MCNC: 0.98 MG/DL (ref 0.76–1.27)
DEPRECATED RDW RBC AUTO: 42 FL (ref 37–54)
EGFRCR SERPLBLD CKD-EPI 2021: 98.7 ML/MIN/1.73
EOSINOPHIL # BLD AUTO: 0 10*3/MM3 (ref 0–0.4)
EOSINOPHIL NFR BLD AUTO: 0 % (ref 0.3–6.2)
ERYTHROCYTE [DISTWIDTH] IN BLOOD BY AUTOMATED COUNT: 12.7 % (ref 12.3–15.4)
GLOBULIN UR ELPH-MCNC: 2.5 GM/DL
GLUCOSE SERPL-MCNC: 120 MG/DL (ref 65–99)
HCT VFR BLD AUTO: 43.4 % (ref 37.5–51)
HGB BLD-MCNC: 14.4 G/DL (ref 13–17.7)
IMM GRANULOCYTES # BLD AUTO: 0.03 10*3/MM3 (ref 0–0.05)
IMM GRANULOCYTES NFR BLD AUTO: 0.3 % (ref 0–0.5)
LIPASE SERPL-CCNC: 23 U/L (ref 13–60)
LYMPHOCYTES # BLD AUTO: 1.01 10*3/MM3 (ref 0.7–3.1)
LYMPHOCYTES NFR BLD AUTO: 9.4 % (ref 19.6–45.3)
MCH RBC QN AUTO: 30 PG (ref 26.6–33)
MCHC RBC AUTO-ENTMCNC: 33.2 G/DL (ref 31.5–35.7)
MCV RBC AUTO: 90.4 FL (ref 79–97)
MONOCYTES # BLD AUTO: 0.68 10*3/MM3 (ref 0.1–0.9)
MONOCYTES NFR BLD AUTO: 6.3 % (ref 5–12)
NEUTROPHILS NFR BLD AUTO: 83.9 % (ref 42.7–76)
NEUTROPHILS NFR BLD AUTO: 9.06 10*3/MM3 (ref 1.7–7)
NRBC BLD AUTO-RTO: 0 /100 WBC (ref 0–0.2)
PLATELET # BLD AUTO: 327 10*3/MM3 (ref 140–450)
PMV BLD AUTO: 8.9 FL (ref 6–12)
POTASSIUM SERPL-SCNC: 4.3 MMOL/L (ref 3.5–5.2)
PROT SERPL-MCNC: 7.1 G/DL (ref 6–8.5)
RBC # BLD AUTO: 4.8 10*6/MM3 (ref 4.14–5.8)
SODIUM SERPL-SCNC: 136 MMOL/L (ref 136–145)
WBC NRBC COR # BLD AUTO: 10.79 10*3/MM3 (ref 3.4–10.8)

## 2024-09-28 PROCEDURE — 25810000003 SODIUM CHLORIDE 0.9 % SOLUTION: Performed by: PHYSICIAN ASSISTANT

## 2024-09-28 PROCEDURE — 85025 COMPLETE CBC W/AUTO DIFF WBC: CPT | Performed by: PHYSICIAN ASSISTANT

## 2024-09-28 PROCEDURE — 96374 THER/PROPH/DIAG INJ IV PUSH: CPT

## 2024-09-28 PROCEDURE — 80053 COMPREHEN METABOLIC PANEL: CPT | Performed by: PHYSICIAN ASSISTANT

## 2024-09-28 PROCEDURE — 25010000002 ONDANSETRON PER 1 MG: Performed by: PHYSICIAN ASSISTANT

## 2024-09-28 PROCEDURE — 83690 ASSAY OF LIPASE: CPT | Performed by: PHYSICIAN ASSISTANT

## 2024-09-28 PROCEDURE — 96375 TX/PRO/DX INJ NEW DRUG ADDON: CPT

## 2024-09-28 PROCEDURE — 99283 EMERGENCY DEPT VISIT LOW MDM: CPT

## 2024-09-28 RX ORDER — ONDANSETRON 2 MG/ML
4 INJECTION INTRAMUSCULAR; INTRAVENOUS ONCE
Status: COMPLETED | OUTPATIENT
Start: 2024-09-28 | End: 2024-09-28

## 2024-09-28 RX ORDER — PANTOPRAZOLE SODIUM 40 MG/10ML
40 INJECTION, POWDER, LYOPHILIZED, FOR SOLUTION INTRAVENOUS ONCE
Status: COMPLETED | OUTPATIENT
Start: 2024-09-28 | End: 2024-09-28

## 2024-09-28 RX ORDER — ESOMEPRAZOLE MAGNESIUM 40 MG/1
40 CAPSULE, DELAYED RELEASE ORAL
Qty: 30 CAPSULE | Refills: 0 | Status: SHIPPED | OUTPATIENT
Start: 2024-09-28

## 2024-09-28 RX ADMIN — PANTOPRAZOLE SODIUM 40 MG: 40 INJECTION, POWDER, FOR SOLUTION INTRAVENOUS at 15:59

## 2024-09-28 RX ADMIN — ONDANSETRON 4 MG: 2 INJECTION INTRAMUSCULAR; INTRAVENOUS at 15:59

## 2024-09-28 RX ADMIN — SODIUM CHLORIDE 1000 ML: 9 INJECTION, SOLUTION INTRAVENOUS at 15:53

## 2024-09-28 NOTE — ED PROVIDER NOTES
Subjective   History of Present Illness  44-year-old male presents the emergency department with a 1 day onset of increased epigastric pain with vomiting.  Patient states that is his GERD.  States that he quit taking his proton pump inhibitor due to running out several days ago.  Patient states every time he does this this occurs.  Patient denies any fevers or chills.  States that he was in discomfort earlier in the day however at this time has no abdominal pain.  Denies dysuria frequent urination hematuria.  Denies history of kidney stones.        Review of Systems    Past Medical History:   Diagnosis Date    Bleeding ulcer     Esophagitis     Hiatal hernia     Hyperlipidemia     Hypertension        No Known Allergies    Past Surgical History:   Procedure Laterality Date    COLONOSCOPY      ENDOSCOPY N/A 09/22/2022    Procedure: ESOPHAGOGASTRODUODENOSCOPY with biopsy x2 areas;  Surgeon: Kendrick Meredith MD;  [facility]: [facility] ENDOSCOPY;  Service: Gastroenterology;  Laterality: N/A;  post: gastritis, hiatal hernia, esophagitis    HERNIA REPAIR         Family History   Problem Relation Age of Onset    Psoriasis Father     Heart disease Maternal Grandmother     Heart disease Paternal Grandmother     Diabetes Paternal Grandmother     Alzheimer's disease Paternal Grandmother     Hypertension Brother     Bipolar disorder Mother        Social History     Socioeconomic History    Marital status: Single   Tobacco Use    Smoking status: Every Day     Current packs/day: 1.50     Average packs/day: 1.5 packs/day for 30.0 years (45.0 ttl pk-yrs)     Types: Cigarettes     Passive exposure: Current    Smokeless tobacco: Never   Vaping Use    Vaping status: Never Used   Substance and Sexual Activity    Alcohol use: Yes     Comment: socially    Drug use: Not Currently     Frequency: 7.0 times per week     Types: Marijuana     Comment: daily    Sexual activity: Defer           Objective   Physical Exam  Vitals and nursing note  "reviewed.   Constitutional:       General: He is not in acute distress.     Appearance: Normal appearance. He is normal weight. He is not ill-appearing, toxic-appearing or diaphoretic.   Cardiovascular:      Rate and Rhythm: Normal rate and regular rhythm.   Pulmonary:      Effort: Pulmonary effort is normal.      Breath sounds: Normal breath sounds.   Abdominal:      Palpations: Abdomen is soft.      Tenderness: There is no abdominal tenderness. There is no guarding or rebound.   Neurological:      General: No focal deficit present.      Mental Status: He is alert and oriented to person, place, and time.   Psychiatric:         Mood and Affect: Mood normal.         Behavior: Behavior normal.         Procedures           ED Course    /66   Pulse 58   Temp 98.2 °F (36.8 °C) (Oral)   Resp 20   Ht 172.7 cm (68\")   Wt 89 kg (196 lb 3.4 oz)   SpO2 95%   BMI 29.83 kg/m²   Labs Reviewed   COMPREHENSIVE METABOLIC PANEL - Abnormal; Notable for the following components:       Result Value    Glucose 120 (*)     All other components within normal limits    Narrative:     GFR Normal >60  Chronic Kidney Disease <60  Kidney Failure <15     CBC WITH AUTO DIFFERENTIAL - Abnormal; Notable for the following components:    Neutrophil % 83.9 (*)     Lymphocyte % 9.4 (*)     Eosinophil % 0.0 (*)     Neutrophils, Absolute 9.06 (*)     All other components within normal limits   LIPASE - Normal   CBC AND DIFFERENTIAL    Narrative:     The following orders were created for panel order CBC & Differential.  Procedure                               Abnormality         Status                     ---------                               -----------         ------                     CBC Auto Differential[[id number]]        Abnormal            Final result                 Please view results for these tests on the individual orders.     Medications   sodium chloride 0.9 % bolus 1,000 mL (1,000 mL Intravenous New Bag 9/28/24 1553) "   ondansetron (ZOFRAN) injection 4 mg (4 mg Intravenous Given 9/28/24 1559)   pantoprazole (PROTONIX) injection 40 mg (40 mg Intravenous Given 9/28/24 1559)     No radiology results for the last day                                           Medical Decision Making  44-year-old male presents emergency department with increased epigastric abdominal pain with nausea vomiting since last night.  Patient states he ran out of his Nexium and his GERD flared up.  Patient states he has had this in the past with similar results.  He denies any fever chills dysuria frequent urination hematuria.  Physical exam patient is in no pain at this time abdominal exams within normal limits lungs are clear to auscultation bilaterally and heart regular rate and rhythm without murmur.  Vitals BP is 127/80.  Temps 98.2, heart rate 63, respirations 20, SpO2 on room air is 98%.  ER course: CBC CMP and lipase all within normal limits.  Patient received Protonix 40 mg IV, 1 L IV normal saline.  Patient was reevaluated before discharge still pain-free.  Patient's Nexium was refilled and he was discharged home in stable condition.    Amount and/or Complexity of Data Reviewed  Labs: ordered.    Risk  Prescription drug management.        Final diagnoses:   Gastroesophageal reflux disease without esophagitis       ED Disposition  ED Disposition       ED Disposition   Discharge    Condition   Stable    Comment   --               Keisha Wilde R, APRN  [address]  [place] [address] [phone]    Schedule an appointment as soon as possible for a visit       [facility] EMERGENCY DEPARTMENT  [address] [address] [phone]    As needed         Where to Get Your Medications        These medications were sent to [id number] - [place], [place] - [address] - [phone]  - [phone] FX  [address] [place] [address]      Phone:  [phone]   esomeprazole 40 MG capsule          Medication List      No changes were made to your prescriptions during this visit.            Anuj Agustin PA-C  09/28/24 8881

## 2024-10-04 ENCOUNTER — ON CAMPUS - OUTPATIENT (OUTPATIENT)
Dept: URBAN - METROPOLITAN AREA HOSPITAL 85 | Facility: HOSPITAL | Age: 44
End: 2024-10-04

## 2024-10-04 ENCOUNTER — ON CAMPUS - OUTPATIENT (OUTPATIENT)
Age: 44
End: 2024-10-04

## 2024-10-04 ENCOUNTER — HOSPITAL ENCOUNTER (OUTPATIENT)
Facility: HOSPITAL | Age: 44
Setting detail: OBSERVATION
Discharge: HOME | End: 2024-10-04
Attending: EMERGENCY MEDICINE | Admitting: EMERGENCY MEDICINE
Payer: COMMERCIAL

## 2024-10-04 ENCOUNTER — APPOINTMENT (OUTPATIENT)
Dept: CT IMAGING | Facility: HOSPITAL | Age: 44
End: 2024-10-04
Payer: COMMERCIAL

## 2024-10-04 ENCOUNTER — READMISSION MANAGEMENT (OUTPATIENT)
Dept: CALL CENTER | Facility: HOSPITAL | Age: 44
End: 2024-10-04
Payer: COMMERCIAL

## 2024-10-04 VITALS
HEIGHT: 68 IN | RESPIRATION RATE: 18 BRPM | DIASTOLIC BLOOD PRESSURE: 69 MMHG | BODY MASS INDEX: 29.17 KG/M2 | OXYGEN SATURATION: 94 % | TEMPERATURE: 98.1 F | SYSTOLIC BLOOD PRESSURE: 114 MMHG | WEIGHT: 192.46 LBS | HEART RATE: 66 BPM

## 2024-10-04 DIAGNOSIS — K59.00 CONSTIPATION, UNSPECIFIED: ICD-10-CM

## 2024-10-04 DIAGNOSIS — R11.10 VOMITING, UNSPECIFIED: ICD-10-CM

## 2024-10-04 DIAGNOSIS — R10.32 LEFT LOWER QUADRANT ABDOMINAL PAIN: Primary | ICD-10-CM

## 2024-10-04 DIAGNOSIS — K21.9 GASTRO-ESOPHAGEAL REFLUX DISEASE WITHOUT ESOPHAGITIS: ICD-10-CM

## 2024-10-04 DIAGNOSIS — K62.5 BRBPR (BRIGHT RED BLOOD PER RECTUM): ICD-10-CM

## 2024-10-04 DIAGNOSIS — K62.5 HEMORRHAGE OF ANUS AND RECTUM: ICD-10-CM

## 2024-10-04 LAB
ALBUMIN SERPL-MCNC: 4.5 G/DL (ref 3.5–5.2)
ALBUMIN/GLOB SERPL: 1.7 G/DL
ALP SERPL-CCNC: 86 U/L (ref 39–117)
ALT SERPL W P-5'-P-CCNC: 8 U/L (ref 1–41)
ANION GAP SERPL CALCULATED.3IONS-SCNC: 9.1 MMOL/L (ref 5–15)
AST SERPL-CCNC: 18 U/L (ref 1–40)
BACTERIA UR QL AUTO: ABNORMAL /HPF
BASOPHILS # BLD AUTO: 0.04 10*3/MM3 (ref 0–0.2)
BASOPHILS NFR BLD AUTO: 0.4 % (ref 0–1.5)
BILIRUB SERPL-MCNC: 0.5 MG/DL (ref 0–1.2)
BILIRUB UR QL STRIP: NEGATIVE
BUN SERPL-MCNC: 12 MG/DL (ref 6–20)
BUN/CREAT SERPL: 11.4 (ref 7–25)
CALCIUM SPEC-SCNC: 9.7 MG/DL (ref 8.6–10.5)
CHLORIDE SERPL-SCNC: 102 MMOL/L (ref 98–107)
CLARITY UR: CLEAR
CO2 SERPL-SCNC: 26.9 MMOL/L (ref 22–29)
COLOR UR: YELLOW
CREAT SERPL-MCNC: 1.05 MG/DL (ref 0.76–1.27)
DEPRECATED RDW RBC AUTO: 41.6 FL (ref 37–54)
EGFRCR SERPLBLD CKD-EPI 2021: 90.9 ML/MIN/1.73
EOSINOPHIL # BLD AUTO: 0.01 10*3/MM3 (ref 0–0.4)
EOSINOPHIL NFR BLD AUTO: 0.1 % (ref 0.3–6.2)
ERYTHROCYTE [DISTWIDTH] IN BLOOD BY AUTOMATED COUNT: 12.7 % (ref 12.3–15.4)
GLOBULIN UR ELPH-MCNC: 2.7 GM/DL
GLUCOSE SERPL-MCNC: 107 MG/DL (ref 65–99)
GLUCOSE UR STRIP-MCNC: NEGATIVE MG/DL
HCT VFR BLD AUTO: 43.6 % (ref 37.5–51)
HGB BLD-MCNC: 14.5 G/DL (ref 13–17.7)
HGB UR QL STRIP.AUTO: NEGATIVE
HYALINE CASTS UR QL AUTO: ABNORMAL /LPF
IMM GRANULOCYTES # BLD AUTO: 0.05 10*3/MM3 (ref 0–0.05)
IMM GRANULOCYTES NFR BLD AUTO: 0.4 % (ref 0–0.5)
KETONES UR QL STRIP: ABNORMAL
LEUKOCYTE ESTERASE UR QL STRIP.AUTO: ABNORMAL
LIPASE SERPL-CCNC: 14 U/L (ref 13–60)
LYMPHOCYTES # BLD AUTO: 1.23 10*3/MM3 (ref 0.7–3.1)
LYMPHOCYTES NFR BLD AUTO: 10.9 % (ref 19.6–45.3)
MCH RBC QN AUTO: 30.2 PG (ref 26.6–33)
MCHC RBC AUTO-ENTMCNC: 33.3 G/DL (ref 31.5–35.7)
MCV RBC AUTO: 90.8 FL (ref 79–97)
MONOCYTES # BLD AUTO: 0.71 10*3/MM3 (ref 0.1–0.9)
MONOCYTES NFR BLD AUTO: 6.3 % (ref 5–12)
NEUTROPHILS NFR BLD AUTO: 81.9 % (ref 42.7–76)
NEUTROPHILS NFR BLD AUTO: 9.23 10*3/MM3 (ref 1.7–7)
NITRITE UR QL STRIP: NEGATIVE
NRBC BLD AUTO-RTO: 0 /100 WBC (ref 0–0.2)
PH UR STRIP.AUTO: 8.5 [PH] (ref 5–8)
PLATELET # BLD AUTO: 344 10*3/MM3 (ref 140–450)
PMV BLD AUTO: 8.6 FL (ref 6–12)
POTASSIUM SERPL-SCNC: 4.5 MMOL/L (ref 3.5–5.2)
PROT SERPL-MCNC: 7.2 G/DL (ref 6–8.5)
PROT UR QL STRIP: NEGATIVE
RBC # BLD AUTO: 4.8 10*6/MM3 (ref 4.14–5.8)
RBC # UR STRIP: ABNORMAL /HPF
REF LAB TEST METHOD: ABNORMAL
SODIUM SERPL-SCNC: 138 MMOL/L (ref 136–145)
SP GR UR STRIP: 1.01 (ref 1–1.03)
SQUAMOUS #/AREA URNS HPF: ABNORMAL /HPF
UROBILINOGEN UR QL STRIP: ABNORMAL
WBC # UR STRIP: ABNORMAL /HPF
WBC NRBC COR # BLD AUTO: 11.27 10*3/MM3 (ref 3.4–10.8)

## 2024-10-04 PROCEDURE — 83690 ASSAY OF LIPASE: CPT | Performed by: PHYSICIAN ASSISTANT

## 2024-10-04 PROCEDURE — G0378 HOSPITAL OBSERVATION PER HR: HCPCS

## 2024-10-04 PROCEDURE — 99223 1ST HOSP IP/OBS HIGH 75: CPT | Performed by: NURSE PRACTITIONER

## 2024-10-04 PROCEDURE — 74177 CT ABD & PELVIS W/CONTRAST: CPT

## 2024-10-04 PROCEDURE — 81001 URINALYSIS AUTO W/SCOPE: CPT | Performed by: PHYSICIAN ASSISTANT

## 2024-10-04 PROCEDURE — 85025 COMPLETE CBC W/AUTO DIFF WBC: CPT | Performed by: PHYSICIAN ASSISTANT

## 2024-10-04 PROCEDURE — 99285 EMERGENCY DEPT VISIT HI MDM: CPT

## 2024-10-04 PROCEDURE — 25510000001 IOPAMIDOL PER 1 ML: Performed by: PHYSICIAN ASSISTANT

## 2024-10-04 PROCEDURE — 80053 COMPREHEN METABOLIC PANEL: CPT | Performed by: PHYSICIAN ASSISTANT

## 2024-10-04 RX ORDER — POLYETHYLENE GLYCOL 3350 17 G/17G
17 POWDER, FOR SOLUTION ORAL DAILY
Qty: 20 EACH | Refills: 2 | Status: SHIPPED | OUTPATIENT
Start: 2024-10-04

## 2024-10-04 RX ORDER — IOPAMIDOL 755 MG/ML
100 INJECTION, SOLUTION INTRAVASCULAR
Status: COMPLETED | OUTPATIENT
Start: 2024-10-04 | End: 2024-10-04

## 2024-10-04 RX ORDER — HYDROCORTISONE ACETATE 25 MG/1
25 SUPPOSITORY RECTAL 2 TIMES DAILY
Qty: 24 EACH | Refills: 2 | Status: CANCELLED | OUTPATIENT
Start: 2024-10-04

## 2024-10-04 RX ORDER — ATORVASTATIN CALCIUM 20 MG/1
20 TABLET, FILM COATED ORAL DAILY
Status: DISCONTINUED | OUTPATIENT
Start: 2024-10-05 | End: 2024-10-04 | Stop reason: HOSPADM

## 2024-10-04 RX ORDER — ALBUTEROL SULFATE 0.83 MG/ML
2.5 SOLUTION RESPIRATORY (INHALATION) EVERY 6 HOURS PRN
Status: DISCONTINUED | OUTPATIENT
Start: 2024-10-04 | End: 2024-10-04 | Stop reason: HOSPADM

## 2024-10-04 RX ORDER — POLYETHYLENE GLYCOL 3350 17 G/17G
17 POWDER, FOR SOLUTION ORAL DAILY
Qty: 24 EACH | Refills: 2 | Status: CANCELLED | OUTPATIENT
Start: 2024-10-04

## 2024-10-04 RX ORDER — ONDANSETRON 2 MG/ML
4 INJECTION INTRAMUSCULAR; INTRAVENOUS EVERY 6 HOURS PRN
Status: DISCONTINUED | OUTPATIENT
Start: 2024-10-04 | End: 2024-10-04 | Stop reason: HOSPADM

## 2024-10-04 RX ORDER — ALUMINA, MAGNESIA, AND SIMETHICONE 2400; 2400; 240 MG/30ML; MG/30ML; MG/30ML
15 SUSPENSION ORAL EVERY 6 HOURS PRN
Status: DISCONTINUED | OUTPATIENT
Start: 2024-10-04 | End: 2024-10-04 | Stop reason: HOSPADM

## 2024-10-04 RX ORDER — PANTOPRAZOLE SODIUM 40 MG/1
40 TABLET, DELAYED RELEASE ORAL
Qty: 60 TABLET | Refills: 2 | Status: SHIPPED | OUTPATIENT
Start: 2024-10-04

## 2024-10-04 RX ORDER — ONDANSETRON 4 MG/1
4 TABLET, ORALLY DISINTEGRATING ORAL EVERY 6 HOURS PRN
Status: DISCONTINUED | OUTPATIENT
Start: 2024-10-04 | End: 2024-10-04 | Stop reason: HOSPADM

## 2024-10-04 RX ORDER — HYDROCORTISONE ACETATE 25 MG/1
25 SUPPOSITORY RECTAL 2 TIMES DAILY
Qty: 24 EACH | Refills: 2 | Status: SHIPPED | OUTPATIENT
Start: 2024-10-04

## 2024-10-04 RX ORDER — NICOTINE 21 MG/24HR
1 PATCH, TRANSDERMAL 24 HOURS TRANSDERMAL EVERY 24 HOURS
Status: DISCONTINUED | OUTPATIENT
Start: 2024-10-04 | End: 2024-10-04 | Stop reason: HOSPADM

## 2024-10-04 RX ORDER — HYDROCORTISONE ACETATE 25 MG/1
25 SUPPOSITORY RECTAL 2 TIMES DAILY
Status: DISCONTINUED | OUTPATIENT
Start: 2024-10-04 | End: 2024-10-04 | Stop reason: HOSPADM

## 2024-10-04 RX ORDER — POLYETHYLENE GLYCOL 3350 17 G/17G
17 POWDER, FOR SOLUTION ORAL DAILY
Status: DISCONTINUED | OUTPATIENT
Start: 2024-10-04 | End: 2024-10-04 | Stop reason: HOSPADM

## 2024-10-04 RX ORDER — PANTOPRAZOLE SODIUM 40 MG/1
40 TABLET, DELAYED RELEASE ORAL
Qty: 60 TABLET | Refills: 1 | Status: CANCELLED | OUTPATIENT
Start: 2024-10-04

## 2024-10-04 RX ORDER — PANTOPRAZOLE SODIUM 40 MG/1
40 TABLET, DELAYED RELEASE ORAL
Status: DISCONTINUED | OUTPATIENT
Start: 2024-10-04 | End: 2024-10-04 | Stop reason: HOSPADM

## 2024-10-04 RX ORDER — SODIUM CHLORIDE 0.9 % (FLUSH) 0.9 %
10 SYRINGE (ML) INJECTION AS NEEDED
Status: DISCONTINUED | OUTPATIENT
Start: 2024-10-04 | End: 2024-10-04 | Stop reason: HOSPADM

## 2024-10-04 RX ADMIN — IOPAMIDOL 100 ML: 755 INJECTION, SOLUTION INTRAVENOUS at 09:09

## 2024-10-04 NOTE — OUTREACH NOTE
Prep Survey      Flowsheet Row Responses   [facility] patient discharged from? [facility]   Is LACE score < 7 ? Yes   Eligibility [facility]   Date of Admission 10/04/24   Date of Discharge 10/04/24   Discharge Disposition Home or Self Care   Discharge diagnosis BRBPR (bright red blood per rectum)   Does the patient have one of the following disease processes/diagnoses(primary or secondary)? Other   Does the patient have Home health ordered? No   Is there a DME ordered? No   Prep survey completed? Yes            Mildred [facility] - Registered Nurse

## 2024-10-04 NOTE — DISCHARGE SUMMARY
[facility]    Keisha Wilde, APRN    CHIEF COMPLAINT:     Gi bleed    HISTORY OF PRESENT ILLNESS:    Nausea  Associated symptoms include abdominal pain, nausea and vomiting. Pertinent negatives include no fever.   Vomiting   Associated symptoms include abdominal pain. Pertinent negatives include no fever.   Abdominal Pain  Associated symptoms include nausea and vomiting. Pertinent negatives include no fever.       ED  44-year-old male with history of esophagitis hypertension previous GI bleed presents to the ER with complaints of left-sided abdominal pain with nausea and vomiting for 6 days.  Patient states he was seen earlier in the week for similar symptoms but was not having as much abdominal pain at that time.  Patient states last night his pain became much worse mostly in the left side of his abdomen.  Described as a cramping aching pain that he rates a 6/10.  He reports persistent nausea and vomiting since yesterday and also reports diarrhea with bright red blood in his stool.  No melena.  Does report history of hemorrhoids.  Patient states he has been taking his PPI and his hemorrhoid medication as prescribed.  Denies excessive alcohol use.  No chest pain shortness breath headache or fever.  Abdominal surgical history significant for bilateral inguinal hernia repair when he was younger.       Past Medical History:   Diagnosis Date    Bleeding ulcer     Esophagitis     Hiatal hernia     Hyperlipidemia     Hypertension      Past Surgical History:   Procedure Laterality Date    COLONOSCOPY      ENDOSCOPY N/A 09/22/2022    Procedure: ESOPHAGOGASTRODUODENOSCOPY with biopsy x2 areas;  Surgeon: Kendrick Meredith MD;  Location: [facility] ENDOSCOPY;  Service: Gastroenterology;  Laterality: N/A;  post: gastritis, hiatal hernia, esophagitis    HERNIA REPAIR       Family History   Problem Relation Age of Onset    Psoriasis Father     Heart disease Maternal Grandmother     Heart disease Paternal  Grandmother     Diabetes Paternal Grandmother     Alzheimer's disease Paternal Grandmother     Hypertension Brother     Bipolar disorder Mother      Social History     Tobacco Use    Smoking status: Every Day     Current packs/day: 1.50     Average packs/day: 1.5 packs/day for 30.0 years (45.0 ttl pk-yrs)     Types: Cigarettes     Passive exposure: Current    Smokeless tobacco: Never   Vaping Use    Vaping status: Never Used   Substance Use Topics    Alcohol use: Yes     Comment: socially    Drug use: Not Currently     Frequency: 7.0 times per week     Types: Marijuana     Comment: daily     Medications Prior to Admission   Medication Sig Dispense Refill Last Dose    atorvastatin (LIPITOR) 20 MG tablet Take 1 tablet by mouth Daily. 90 tablet 1 10/4/2024    esomeprazole (nexIUM) 40 MG capsule Take 1 capsule by mouth Every Morning Before Breakfast. 30 capsule 0 10/4/2024    vitamin D (ERGOCALCIFEROL) 1.25 MG (47961 UT) capsule capsule Take 1 capsule by mouth 1 (One) Time Per Week. 12 capsule 1 9/28/2024    albuterol sulfate  (90 Base) MCG/ACT inhaler Inhale 2 puffs Every 4 (Four) Hours As Needed for Wheezing. 8 g 0      Allergies:  Patient has no known allergies.    Immunization History   Administered Date(s) Administered    Tdap 04/15/2022           REVIEW OF SYSTEMS:    Review of Systems   Constitutional: Negative for fever.   Respiratory:  Negative for shortness of breath.    Gastrointestinal:  Positive for abdominal pain, nausea and vomiting.           Vital Signs  Temp:  [98 °F (36.7 °C)-98.1 °F (36.7 °C)] 98.1 °F (36.7 °C)  Heart Rate:  [63-91] 66  Resp:  [17-18] 18  BP: ()/(62-94) 114/69          Physical Exam:  Physical Exam  Constitutional:       Appearance: Normal appearance.   Cardiovascular:      Rate and Rhythm: Normal rate and regular rhythm.   Pulmonary:      Effort: Pulmonary effort is normal.      Breath sounds: Normal breath sounds.   Abdominal:      Palpations: Abdomen is soft.    Neurological:      General: No focal deficit present.      Mental Status: He is alert and oriented to person, place, and time.   Psychiatric:         Mood and Affect: Mood normal.         Behavior: Behavior normal.       Emotional Behavior:    wnl   Debilities:   None      Results Review:    I reviewed the patient's new clinical results.  Lab Results (most recent)       Procedure Component Value Units Date/Time    Comprehensive Metabolic Panel [[id number]]  (Abnormal) Collected: 10/04/24 0851    Specimen: Blood Updated: 10/04/24 0924     Glucose 107 mg/dL      BUN 12 mg/dL      Creatinine 1.05 mg/dL      Sodium 138 mmol/L      Potassium 4.5 mmol/L      Chloride 102 mmol/L      CO2 26.9 mmol/L      Calcium 9.7 mg/dL      Total Protein 7.2 g/dL      Albumin 4.5 g/dL      ALT (SGPT) 8 U/L      AST (SGOT) 18 U/L      Alkaline Phosphatase 86 U/L      Total Bilirubin 0.5 mg/dL      Globulin 2.7 gm/dL      A/G Ratio 1.7 g/dL      BUN/Creatinine Ratio 11.4     Anion Gap 9.1 mmol/L      eGFR 90.9 mL/min/1.73     Narrative:      GFR Normal >60  Chronic Kidney Disease <60  Kidney Failure <15      Lipase [[id number]]  (Normal) Collected: 10/04/24 0851    Specimen: Blood Updated: 10/04/24 0924     Lipase 14 U/L     Urinalysis, Microscopic Only - Urine, Clean Catch [[id number]]  (Abnormal) Collected: 10/04/24 0856    Specimen: Urine, Clean Catch Updated: 10/04/24 0906     RBC, UA 0-2 /HPF      WBC, UA 11-20 /HPF      Bacteria, UA None Seen /HPF      Squamous Epithelial Cells, UA 0-2 /HPF      Hyaline Casts, UA None Seen /LPF      Methodology Automated Microscopy    Urinalysis With Microscopic If Indicated (No Culture) - Urine, Clean Catch [[id number]]  (Abnormal) Collected: 10/04/24 0856    Specimen: Urine, Clean Catch Updated: 10/04/24 0906     Color, UA Yellow     Appearance, UA Clear     pH, UA 8.5     Specific Gravity, UA 1.011     Glucose, UA Negative     Ketones, UA 15 mg/dL (1+)     Bilirubin, UA Negative     Blood, UA  Negative     Protein, UA Negative     Leuk Esterase, UA Trace     Nitrite, UA Negative     Urobilinogen, UA 1.0 E.U./dL    CBC & Differential [[id number]]  (Abnormal) Collected: 10/04/24 0851    Specimen: Blood Updated: 10/04/24 0857    Narrative:      The following orders were created for panel order CBC & Differential.  Procedure                               Abnormality         Status                     ---------                               -----------         ------                     CBC Auto Differential[[id number]]        Abnormal            Final result                 Please view results for these tests on the individual orders.    CBC Auto Differential [[id number]]  (Abnormal) Collected: 10/04/24 0851    Specimen: Blood Updated: 10/04/24 0857     WBC 11.27 10*3/mm3      RBC 4.80 10*6/mm3      Hemoglobin 14.5 g/dL      Hematocrit 43.6 %      MCV 90.8 fL      MCH 30.2 pg      MCHC 33.3 g/dL      RDW 12.7 %      RDW-SD 41.6 fl      MPV 8.6 fL      Platelets 344 10*3/mm3      Neutrophil % 81.9 %      Lymphocyte % 10.9 %      Monocyte % 6.3 %      Eosinophil % 0.1 %      Basophil % 0.4 %      Immature Grans % 0.4 %      Neutrophils, Absolute 9.23 10*3/mm3      Lymphocytes, Absolute 1.23 10*3/mm3      Monocytes, Absolute 0.71 10*3/mm3      Eosinophils, Absolute 0.01 10*3/mm3      Basophils, Absolute 0.04 10*3/mm3      Immature Grans, Absolute 0.05 10*3/mm3      nRBC 0.0 /100 WBC             Imaging Results (Most Recent)       Procedure Component Value Units Date/Time    CT Abdomen Pelvis With Contrast [[id number]] Collected: 10/04/24 0911     Updated: 10/04/24 0918    Narrative:      CT ABDOMEN PELVIS W CONTRAST    Date of Exam: 10/4/2024 9:05 AM EDT    Indication: abd pain rectal bleeding.    Comparison: CT abdomen pelvis dated 9/22/2022    Technique: Axial CT images were obtained of the abdomen and pelvis following the uneventful intravenous administration of iodinated contrast. Sagittal and coronal  reconstructions were performed.  Automated exposure control and iterative reconstruction   methods were used.      Findings:  Lung Bases:    The visualized lung bases and lower mediastinal structures are unremarkable.      Liver:Liver is normal in size and CT density. No focal lesions.      Biliary/Gallbladder: The gallbladder is without evidence of radiopaque stones. The biliary tree is nondilated.      Spleen:Spleen is normal in size and CT density.    Pancreas:   Pancreas shows homogeneous density. There is no evidence of pancreatic mass or peripancreatic fluid.      Kidneys: Kidneys are normal in size. There are no stones or hydronephrosis.      Adrenals: Adrenal glands are unremarkable.      Retroperitoneal/Lymph Nodes/Vasculature: No retroperitoneal adenopathy is identified by size criteria.      Gastrointestinal/Mesentery: The bowel loops are non-dilated without definite wall thickening or mass. The appendix appears within normal limits. No evidence of obstruction. No free air.      Bladder: The bladder is unremarkable    No acute abnormalities in the pelvis    Bony Structures:  Visualized bony structures are consistent with the patient's age.        Impression:      Impression:  Unremarkable CT of the abdomen and pelvis            Electronically Signed: Nick Samuels MD    10/4/2024 9:16 AM EDT    Workstation ID: [id number]          reviewed    ECG/EMG Results (most recent)       None          reviewed            Microbiology Results (last 10 days)       ** No results found for the last 240 hours. **            Assessment & Plan     BRBPR (bright red blood per rectum)       Rectal bleed  - cbc, cmp, ua, lipase unremarkable  - ct abd reviewed and showing no acute abdominal process  - gi consult  - gi recommends discharge and start ppi bid, miralax, anusol.     I discussed the patients findings and my recommendations with patient and nursing staff.     Discharge Diagnosis:      BRBPR (bright red blood per  rectum)      Hospital Course  Patient is a 44 y.o. male presented with rectal bleeding. Er evaluated and admitted to observation. Labs including cbc, cmp, ua and lipase are normal. Ct ab normal. Gi consulted and recommends starting miralax, anusol and ppi twice daily. Discharge discussed with pt and he is agreeable to plan. Instructed pt to return to er if symptoms reoccur or worsen.  .      Past Medical History:     Past Medical History:   Diagnosis Date    Bleeding ulcer     Esophagitis     Hiatal hernia     Hyperlipidemia     Hypertension        Past Surgical History:     Past Surgical History:   Procedure Laterality Date    COLONOSCOPY      ENDOSCOPY N/A 09/22/2022    Procedure: ESOPHAGOGASTRODUODENOSCOPY with biopsy x2 areas;  Surgeon: Kendrick Meredith MD;  Location: [facility] ENDOSCOPY;  Service: Gastroenterology;  Laterality: N/A;  post: gastritis, hiatal hernia, esophagitis    HERNIA REPAIR         Social History:   Social History     Socioeconomic History    Marital status: Single   Tobacco Use    Smoking status: Every Day     Current packs/day: 1.50     Average packs/day: 1.5 packs/day for 30.0 years (45.0 ttl pk-yrs)     Types: Cigarettes     Passive exposure: Current    Smokeless tobacco: Never   Vaping Use    Vaping status: Never Used   Substance and Sexual Activity    Alcohol use: Yes     Comment: socially    Drug use: Not Currently     Frequency: 7.0 times per week     Types: Marijuana     Comment: daily    Sexual activity: Defer       Procedures Performed         Consults:   Consults       Date and Time Order Name Status Description    10/4/2024  1:06 PM Inpatient Gastroenterology Consult              Condition on Discharge:     Stable    Discharge Disposition      Discharge Medications     Discharge Medications        Continue These Medications        Instructions Start Date   albuterol sulfate  (90 Base) MCG/ACT inhaler  Commonly known as: PROVENTIL HFA;VENTOLIN HFA;PROAIR HFA   2 puffs,  Inhalation, Every 4 Hours PRN      atorvastatin 20 MG tablet  Commonly known as: LIPITOR   20 mg, Oral, Daily      esomeprazole 40 MG capsule  Commonly known as: nexIUM   40 mg, Oral, Every Morning Before Breakfast      vitamin D 1.25 MG (00233 UT) capsule capsule  Commonly known as: ERGOCALCIFEROL   50,000 Units, Oral, Weekly               Discharge Diet:     Activity at Discharge:     Follow-up Appointments  Future Appointments   Date Time Provider Department Center   10/15/2024  8:00 AM Keisha Wilde APRN [facility]         Test Results Pending at Discharge       Risk for Readmission (LACE) Score: 2 (10/4/2024 11:19 AM)      Less Than 30 minutes spent in discharge activities for this patient    Signature:Electronically signed by Virgen Shrestha PA-C, 10/04/24, 4:15 PM EDT.

## 2024-10-04 NOTE — ED PROVIDER NOTES
Subjective   History of Present Illness  Chief Complaint: Abdominal pain, rectal bleeding    Patient is a 44-year-old male with history of esophagitis hypertension previous GI bleed presents to the ER with complaints of left-sided abdominal pain with nausea and vomiting for 6 days.  Patient states he was seen earlier in the week for similar symptoms but was not having as much abdominal pain at that time.  Patient states last night his pain became much worse mostly in the left side of his abdomen.  Described as a cramping aching pain that he rates a 6/10.  He reports persistent nausea and vomiting since yesterday and also reports diarrhea with bright red blood in his stool.  No melena.  Does report history of hemorrhoids.  Patient states he has been taking his PPI and his hemorrhoid medication as prescribed.  Denies excessive alcohol use.  No chest pain shortness breath headache or fever.  Abdominal surgical history significant for bilateral inguinal hernia repair when he was younger.    PCP: Keisha Wilde  GI: [facility]    History provided by:  Patient      Review of Systems   Constitutional:  Negative for chills and fever.   HENT:  Negative for sore throat and trouble swallowing.    Eyes: Negative.    Respiratory:  Negative for shortness of breath and wheezing.    Cardiovascular:  Negative for chest pain.   Gastrointestinal:  Positive for abdominal pain, blood in stool, diarrhea, nausea and vomiting.   Endocrine: Negative.    Genitourinary:  Negative for dysuria.   Musculoskeletal:  Negative for back pain and myalgias.   Skin:  Negative for rash.   Allergic/Immunologic: Negative.    Neurological:  Negative for headaches.   Psychiatric/Behavioral:  Negative for behavioral problems.    All other systems reviewed and are negative.      Past Medical History:   Diagnosis Date    Bleeding ulcer     Esophagitis     Hiatal hernia     Hyperlipidemia     Hypertension        No Known Allergies    Past Surgical History:    Procedure Laterality Date    COLONOSCOPY      ENDOSCOPY N/A 09/22/2022    Procedure: ESOPHAGOGASTRODUODENOSCOPY with biopsy x2 areas;  Surgeon: Kendrick Meredith MD;  Location: [facility] ENDOSCOPY;  Service: Gastroenterology;  Laterality: N/A;  post: gastritis, hiatal hernia, esophagitis    HERNIA REPAIR         Family History   Problem Relation Age of Onset    Psoriasis Father     Heart disease Maternal Grandmother     Heart disease Paternal Grandmother     Diabetes Paternal Grandmother     Alzheimer's disease Paternal Grandmother     Hypertension Brother     Bipolar disorder Mother        Social History     Socioeconomic History    Marital status: Single   Tobacco Use    Smoking status: Every Day     Current packs/day: 1.50     Average packs/day: 1.5 packs/day for 30.0 years (45.0 ttl pk-yrs)     Types: Cigarettes     Passive exposure: Current    Smokeless tobacco: Never   Vaping Use    Vaping status: Never Used   Substance and Sexual Activity    Alcohol use: Yes     Comment: socially    Drug use: Not Currently     Frequency: 7.0 times per week     Types: Marijuana     Comment: daily    Sexual activity: Defer           Objective   Physical Exam  Vitals and nursing note reviewed.   Constitutional:       Appearance: He is well-developed.   Cardiovascular:      Rate and Rhythm: Normal rate and regular rhythm.      Heart sounds: Normal heart sounds. No murmur heard.  Abdominal:      General: Abdomen is flat and scaphoid. Bowel sounds are normal.      Palpations: Abdomen is soft.      Tenderness: There is abdominal tenderness in the left lower quadrant. There is no right CVA tenderness, left CVA tenderness or guarding.   Genitourinary:     Rectum: Normal. Guaiac result negative.   Skin:     General: Skin is warm.      Capillary Refill: Capillary refill takes less than 2 seconds.   Neurological:      General: No focal deficit present.      Mental Status: He is alert and oriented to person, place, and time.   Psychiatric:     "     Mood and Affect: Mood normal.         Behavior: Behavior normal.         Procedures           ED Course  ED Course as of 10/04/24 1733   Fri Oct 04, 2024   0952 Patient reevaluated, reports that he feels somewhat better.  Discussed option for admission versus discharge for GI consultation and possible scope.  Patient agreeable for observation admission for further evaluation. []   1040 I spoke with DERECK Seymour regarding observation admission for GI consult due to BRBPR []      ED Course User Index  [] Ceci Bone PA   /69   Pulse 66   Temp 98.1 °F (36.7 °C) (Oral)   Resp 18   Ht 172.7 cm (68\")   Wt 87.3 kg (192 lb 7.4 oz)   SpO2 94%   BMI 29.26 kg/m²   Labs Reviewed   COMPREHENSIVE METABOLIC PANEL - Abnormal; Notable for the following components:       Result Value    Glucose 107 (*)     All other components within normal limits    Narrative:     GFR Normal >60  Chronic Kidney Disease <60  Kidney Failure <15     URINALYSIS W/ MICROSCOPIC IF INDICATED (NO CULTURE) - Abnormal; Notable for the following components:    pH, UA 8.5 (*)     Ketones, UA 15 mg/dL (1+) (*)     Leuk Esterase, UA Trace (*)     All other components within normal limits   CBC WITH AUTO DIFFERENTIAL - Abnormal; Notable for the following components:    WBC 11.27 (*)     Neutrophil % 81.9 (*)     Lymphocyte % 10.9 (*)     Eosinophil % 0.1 (*)     Neutrophils, Absolute 9.23 (*)     All other components within normal limits   URINALYSIS, MICROSCOPIC ONLY - Abnormal; Notable for the following components:    WBC, UA 11-20 (*)     All other components within normal limits   LIPASE - Normal   CBC AND DIFFERENTIAL    Narrative:     The following orders were created for panel order CBC & Differential.  Procedure                               Abnormality         Status                     ---------                               -----------         ------                     CBC Auto Differential[[id number]]        Abnormal    "         Final result                 Please view results for these tests on the individual orders.     Medications   sodium chloride 0.9 % flush 10 mL (has no administration in time range)   nicotine (NICODERM CQ) 21 MG/24HR patch 1 patch (has no administration in time range)   polyethylene glycol (MIRALAX) packet 17 g (has no administration in time range)   pantoprazole (PROTONIX) EC tablet 40 mg (has no administration in time range)   hydrocortisone (ANUSOL-HC) suppository 25 mg (has no administration in time range)   albuterol (PROVENTIL) nebulizer solution 0.083% 2.5 mg/3mL (has no administration in time range)   atorvastatin (LIPITOR) tablet 20 mg (has no administration in time range)   aluminum-magnesium hydroxide-simethicone (MAALOX MAX) 400-400-40 MG/5ML suspension 15 mL (has no administration in time range)   ondansetron ODT (ZOFRAN-ODT) disintegrating tablet 4 mg (has no administration in time range)     Or   ondansetron (ZOFRAN) injection 4 mg (has no administration in time range)   iopamidol (ISOVUE-370) 76 % injection 100 mL (100 mL Intravenous Given 10/4/24 0909)     CT Abdomen Pelvis With Contrast    Result Date: 10/4/2024  Impression: Unremarkable CT of the abdomen and pelvis Electronically Signed: Nick Samuels MD  10/4/2024 9:16 AM EDT  Workstation ID: [id number]                                            Medical Decision Making  Differential Dx (Includes but not limited to): GI bleed colitis diverticulitis    Chart Review: No pertinent records to review.  Patient was seen in the ER 1 week ago for similar symptoms and started on PPI.    While in the ED IV was placed and labs were obtained appropriate PPE was worn during exam and throughout all encounters with the patient.  Patient had the evaluation.  He is afebrile nontoxic appearance in no acute distress.  He is hemodynamically stable.  Lab work reassuring.  Mild leukocytosis, normal hemoglobin.  CT imaging negative for acute infectious process  no evidence of diverticulitis or obstruction.  Discussed admission versus discharge with patient at bedside.  Will be placed in ED observation for GI consultation, patient may benefit from colonoscopy.  Patient also recommended to take stool softener at home to prevent constipation and straining associated with his hemorrhoids.  Patient agreeable to plan for observation admission.  Spoke with Barbie Shrestha who agreed to accept patient to ED observation.    Problems Addressed:  BRBPR (bright red blood per rectum): acute illness or injury  Left lower quadrant abdominal pain: acute illness or injury    Amount and/or Complexity of Data Reviewed  Labs: ordered. Decision-making details documented in ED Course.  Radiology: ordered. Decision-making details documented in ED Course.    Risk  OTC drugs.  Prescription drug management.  Decision regarding hospitalization.        Final diagnoses:   Left lower quadrant abdominal pain   BRBPR (bright red blood per rectum)       ED Disposition  ED Disposition       ED Disposition   Decision to Admit    Condition   --    Comment   --               Jared Lanier MD  [address]  [place] [id number] [phone]    Schedule an appointment as soon as possible for a visit in 1 month(s)      Keisha Wilde, APRN  [address]  [place] [address]  [phone]    Schedule an appointment as soon as possible for a visit in 1 week(s)           Medication List        New Prescriptions      hydrocortisone 25 MG suppository  Commonly known as: ANUSOL-HC  Insert 1 suppository into the rectum 2 (Two) Times a Day.     pantoprazole 40 MG EC tablet  Commonly known as: PROTONIX  Take 1 tablet by mouth 2 (Two) Times a Day Before Meals.     polyethylene glycol 17 g packet  Commonly known as: MIRALAX  Take 17 g by mouth Daily.               Where to Get Your Medications        These medications were sent to [facility] - [place], [place] - [address] [place] [address] -  [phone] [facility] [phone] FX  [address] [place] [address] [place] [address]      Phone: [phone]   hydrocortisone 25 MG suppository  pantoprazole 40 MG EC tablet  polyethylene glycol 17 g Ceci Saavedra PA  10/04/24 7554

## 2024-10-04 NOTE — PLAN OF CARE
Goal Outcome Evaluation:    New admit. Patient sleeping between care. Awaiting GI consult for further recommendations

## 2024-10-04 NOTE — CONSULTS
"GI CONSULT  NOTE:    Referring Provider:  Dr. Levy    Chief complaint: Nocturnal vomiting    Subjective . \"  I vomit at night sometimes\"    History of present illness: Chivo Billy is a 44 y.o. male who has a history of erosive esophagitis, hiatal hernia and chronic constipation.  He presents with increased reflux recently despite daily PPI.  He denies daytime symptoms except for waking up with nausea at times but typically has regurgitation with intermittent vomiting at night.  He eats between 6 and 8 PM and goes to bed between 8 and 11 PM.  No unintentional weight loss.  No difficulty swallowing.  He has chronic constipation with bowel movement every 2 to 3 days with intermittent bright red blood per rectum with hard stools.  No melena.  He takes occasional MiraLAX with some improvement but does not take a daily regimen.  He is hungry and overall feeling well at this time.    Endo History:  1/2023 EGD/colonoscopy by Dr. Meredith -medium hiatal hernia, gastritis with biopsies H. pylori negative, diverticulosis, hyperplastic polyps, hemorrhoids  9/2022 EGD by Dr. Meredith -grade C erosive esophagitis with biopsies negative for eosinophilic esophagitis, gastritis with biopsies H. pylori negative, hiatal hernia    Past Medical History:  Past Medical History:   Diagnosis Date    Bleeding ulcer     Esophagitis     Hiatal hernia     Hyperlipidemia     Hypertension        Past Surgical History:  Past Surgical History:   Procedure Laterality Date    COLONOSCOPY      ENDOSCOPY N/A 09/22/2022    Procedure: ESOPHAGOGASTRODUODENOSCOPY with biopsy x2 areas;  Surgeon: Kendrick Meredith MD;  Location: [facility] ENDOSCOPY;  Service: Gastroenterology;  Laterality: N/A;  post: gastritis, hiatal hernia, esophagitis    HERNIA REPAIR         Social History:  Social History     Tobacco Use    Smoking status: Every Day     Current packs/day: 1.50     Average packs/day: 1.5 packs/day for 30.0 years (45.0 ttl pk-yrs)     Types: Cigarettes     " Passive exposure: Current    Smokeless tobacco: Never   Vaping Use    Vaping status: Never Used   Substance Use Topics    Alcohol use: Yes     Comment: socially    Drug use: Not Currently     Frequency: 7.0 times per week     Types: Marijuana     Comment: daily       Family History:  Family History   Problem Relation Age of Onset    Psoriasis Father     Heart disease Maternal Grandmother     Heart disease Paternal Grandmother     Diabetes Paternal Grandmother     Alzheimer's disease Paternal Grandmother     Hypertension Brother     Bipolar disorder Mother        Medications:  Medications Prior to Admission   Medication Sig Dispense Refill Last Dose    atorvastatin (LIPITOR) 20 MG tablet Take 1 tablet by mouth Daily. 90 tablet 1 10/4/2024    esomeprazole (nexIUM) 40 MG capsule Take 1 capsule by mouth Every Morning Before Breakfast. 30 capsule 0 10/4/2024    vitamin D (ERGOCALCIFEROL) 1.25 MG (99008 UT) capsule capsule Take 1 capsule by mouth 1 (One) Time Per Week. 12 capsule 1 9/28/2024    albuterol sulfate  (90 Base) MCG/ACT inhaler Inhale 2 puffs Every 4 (Four) Hours As Needed for Wheezing. 8 g 0        Scheduled Meds:hydrocortisone, 25 mg, Rectal, BID  nicotine, 1 patch, Transdermal, Q24H  pantoprazole, 40 mg, Oral, BID AC  polyethylene glycol, 17 g, Oral, Daily      Continuous Infusions:   PRN Meds:.  [COMPLETED] Insert Peripheral IV **AND** sodium chloride    ALLERGIES:  Patient has no known allergies.    ROS:  The following systems were reviewed   Constitution:  No fevers, chills, no unintentional weight loss  Skin: no rash, no jaundice  Eyes:  No blurry vision, no eye pain  HENT:  No change in hearing or smell  Resp:  No dyspnea or cough  CV:  No chest pain or palpitations  :  No dysuria, hematuria  Musculoskeletal:  No leg cramps or arthralgias  Neuro:  No tremor, no numbness  Psych:  No depression or confusion    Objective resting in bed, no acute distress, no family present    Vital Signs:  "  Vitals:    10/04/24 1345 10/04/24 1400 10/04/24 1415 10/04/24 1430   BP: 109/71 113/67 118/72    BP Location:       Patient Position:       Pulse: 66 72 67 66   Resp:       Temp:       TempSrc:       SpO2: 94% 92% 93% 94%   Weight:       Height:           Physical Exam:       General Appearance:    Awake and alert, in no acute distress   Head:    Normocephalic, without obvious abnormality, atraumatic   Throat:   No oral lesions, no thrush, oral mucosa moist   Lungs:     Respirations regular, even and unlabored   Chest Wall:    No abnormalities observed   Abdomen:   Nondistended   Rectal:     Deferred   Extremities:   Moves all extremities, no cyanosis       Skin:   No rash, no jaundice, normal palpation       Neurologic:   Cranial nerves 2 - 12 grossly intact       Results Review:   I reviewed the patient's labs and imaging.  CBC    Results from last 7 days   Lab Units 10/04/24  0851 09/28/24  1556   WBC 10*3/mm3 11.27* 10.79   HEMOGLOBIN g/dL 14.5 14.4   PLATELETS 10*3/mm3 344 327     CMP   Results from last 7 days   Lab Units 10/04/24  0851 09/28/24  1556   SODIUM mmol/L 138 136   POTASSIUM mmol/L 4.5 4.3   CHLORIDE mmol/L 102 99   CO2 mmol/L 26.9 24.6   BUN mg/dL 12 12   CREATININE mg/dL 1.05 0.98   GLUCOSE mg/dL 107* 120*   ALBUMIN g/dL 4.5 4.6   BILIRUBIN mg/dL 0.5 0.6   ALK PHOS U/L 86 81   AST (SGOT) U/L 18 18   ALT (SGPT) U/L 8 13   LIPASE U/L 14 23     Cr Clearance Estimated Creatinine Clearance: 98.5 mL/min (by C-G formula based on SCr of 1.05 mg/dL).  Coag     HbA1C   Lab Results   Component Value Date    HGBA1C 5.60 04/09/2024     Blood Glucose No results found for: \"POCGLU\"  Infection     UA    Results from last 7 days   Lab Units 10/04/24  0856   NITRITE UA  Negative   WBC UA /HPF 11-20*   BACTERIA UA /HPF None Seen   SQUAM EPITHEL UA /HPF 0-2     Imaging Results (Last 72 Hours)       Procedure Component Value Units Date/Time    CT Abdomen Pelvis With Contrast [[id number]] Collected: 10/04/24 0911     " Updated: 10/04/24 0918    Narrative:      CT ABDOMEN PELVIS W CONTRAST    Date of Exam: 10/4/2024 9:05 AM EDT    Indication: abd pain rectal bleeding.    Comparison: CT abdomen pelvis dated 9/22/2022    Technique: Axial CT images were obtained of the abdomen and pelvis following the uneventful intravenous administration of iodinated contrast. Sagittal and coronal reconstructions were performed.  Automated exposure control and iterative reconstruction   methods were used.      Findings:  Lung Bases:    The visualized lung bases and lower mediastinal structures are unremarkable.      Liver:Liver is normal in size and CT density. No focal lesions.      Biliary/Gallbladder: The gallbladder is without evidence of radiopaque stones. The biliary tree is nondilated.      Spleen:Spleen is normal in size and CT density.    Pancreas:   Pancreas shows homogeneous density. There is no evidence of pancreatic mass or peripancreatic fluid.      Kidneys: Kidneys are normal in size. There are no stones or hydronephrosis.      Adrenals: Adrenal glands are unremarkable.      Retroperitoneal/Lymph Nodes/Vasculature: No retroperitoneal adenopathy is identified by size criteria.      Gastrointestinal/Mesentery: The bowel loops are non-dilated without definite wall thickening or mass. The appendix appears within normal limits. No evidence of obstruction. No free air.      Bladder: The bladder is unremarkable    No acute abnormalities in the pelvis    Bony Structures:  Visualized bony structures are consistent with the patient's age.        Impression:      Impression:  Unremarkable CT of the abdomen and pelvis            Electronically Signed: Nick Samuels MD    10/4/2024 9:16 AM EDT    Workstation ID: [id number]            ASSESSMENT:  GERD  Constipation  Rectal bleeding -likely hemorrhoidal in the setting of chronic constipation    PLAN:  Patient is 44-year-old male with a history of GERD and chronic constipation who presents with  nocturnal regurgitation with intermittent vomiting.  He reports worsening symptoms over the last month.    -LFTs normal, lipase normal, WBC 11.2, hemoglobin 14.5.  Hemoglobin 14.4 on 9/28/2024.  -CT abdomen and pelvis with contrast is unremarkable.    Suspect regurgitation at night contributing to his intermittent vomiting.  We discussed lifestyle and diet changes including elevating his head of bed and not eating 3 hours prior to bedtime.  Would recommend twice daily PPI for the next month then decrease back to daily nightly.  He was encouraged to start MiraLAX and Benefiber daily for chronic constipation as this could be contributing to his upper GI symptoms as well.  Suspect bleeding with bowel movements is hemorrhoidal in the setting of chronic constipation with history of hemorrhoids with previous colonoscopy.  Would recommend Anusol suppositories and bowel regimen.  Okay to discharge home from GI standpoint if tolerating diet with outpatient GI follow-up for management of his chronic GI issues.  We will see as needed, call with questions or concerns.  Discussed with bedside RN.    I discussed the patients findings and my recommendations with the patient.    We appreciate the referral    Electronically signed by PATRICIA Albright, 10/04/24, 3:04 PM EDT.

## 2024-10-07 ENCOUNTER — TRANSITIONAL CARE MANAGEMENT TELEPHONE ENCOUNTER (OUTPATIENT)
Dept: CALL CENTER | Facility: HOSPITAL | Age: 44
End: 2024-10-07
Payer: COMMERCIAL

## 2024-10-07 NOTE — OUTREACH NOTE
Call Center TCM Note      Flowsheet Row Responses   [facility] patient discharged from? [facility]   Does the patient have one of the following disease processes/diagnoses(primary or secondary)? Other   TCM attempt successful? Yes   Call end time 1012   Discharge diagnosis BRBPR (bright red blood per rectum)   Meds reviewed with patient/caregiver? Yes   Is the patient having any side effects they believe may be caused by any medication additions or changes? No   Does the patient have all medications ordered at discharge? Yes   Is the patient taking all medications as directed (includes completed medication regime)? Yes   Comments Pt has previously made appt for 10/15 @ 8:00 with PATRICIA Huang.  Will notify office it will need to be a hospital f/u appt.   Does the patient have an appointment with their PCP within 7-14 days of discharge? Yes   Psychosocial issues? No   Did the patient receive a copy of their discharge instructions? Yes   Nursing interventions Reviewed instructions with patient   What is the patient's perception of their health status since discharge? Improving   Is the patient/caregiver able to teach back signs and symptoms related to disease process for when to call PCP? Yes   Is the patient/caregiver able to teach back signs and symptoms related to disease process for when to call 911? Yes   Is the patient/caregiver able to teach back the hierarchy of who to call/visit for symptoms/problems? PCP, Specialist, Home health nurse, Urgent Care, ED, 911 Yes   If the patient is a current smoker, are they able to teach back resources for cessation? Smoking cessation medications   Additional teach back comments States he is still having bleeding with bowel movements.  States it is still alot.  Advised if he is dizzy, weak, pale he needs to go to ED to be evaluated.  Or if it continues or worsens to go to ED.  He is calling today GI for appt to have a colonoscopy and will also notify GI of still  bleeding.   TCM call completed? Yes   Wrap up additional comments Pt to go to ED if bleeding continues, worsens or has other symptoms.   Call end time 1012            Kasia Talley LPN    10/7/2024, 10:17 EDT

## 2024-10-07 NOTE — PROGRESS NOTES
Spoke with patient and he wanted to come in sooner so I rescheduled him to 10/8 with a 30 minute appointment.

## 2024-10-07 NOTE — PROGRESS NOTES
"Transitional Care Follow Up Visit  Subjective     Chivo Billy is a 44 y.o. male who presents for a transitional care management visit.    Within 48 business hours after discharge our office contacted him via telephone to coordinate his care and needs.      I reviewed and discussed the details of that call along with the discharge summary, hospital problems, inpatient lab results, inpatient diagnostic studies, and consultation reports with Chivo.     Current outpatient and discharge medications have been reconciled for the patient.  Reviewed by: PATRICIA Pelayo          10/4/2024     5:26 PM   Date of TCM Phone Call   [facility]   Date of Admission 10/4/2024   Date of Discharge 10/4/2024   Discharge Disposition Home or Self Care     Risk for Readmission (LACE) Score: 2 (10/4/2024 11:19 AM)      History of Present Illness   Course During Hospital Stay:  Hospital course per hospital records: \"Patient is a 44 y.o. male presented with rectal bleeding. Er evaluated and admitted to observation. Labs including cbc, cmp, ua and lipase are normal. Ct ab normal. Gi consulted and recommends starting miralax, anusol and ppi twice daily. Discharge discussed with pt and he is agreeable to plan. Instructed pt to return to er if symptoms reoccur or worsen. \"    Still having bleeding with BM's; stops quickly after the BM.    Colonoscopy Jan 2023 showed large internal and external hemorrhoids.   He is using the Anusol cream, along with Miralax.  Getting plenty of fluids.   Acid reflux is better with pantoprazole twice daily.       The following portions of the patient's history were reviewed and updated as appropriate: allergies, current medications, past family history, past medical history, past social history, past surgical history, and problem list.    Review of Systems   Constitutional:  Negative for chills, fatigue and fever.   Respiratory:  Negative for cough, shortness of breath and wheezing.  " "  Cardiovascular:  Negative for chest pain and palpitations.   Gastrointestinal:  Positive for anal bleeding, blood in stool and rectal pain. Negative for abdominal pain, constipation, diarrhea, nausea and vomiting.   Genitourinary:  Negative for dysuria, flank pain, frequency, hematuria and urgency.   Neurological:  Negative for dizziness, weakness, light-headedness and headaches.   Psychiatric/Behavioral:  Negative for dysphoric mood. The patient is not nervous/anxious.        Objective   /87 (BP Location: Left arm, Patient Position: Sitting, Cuff Size: Large Adult)   Pulse 75   Ht 172.7 cm (68\")   Wt 89.8 kg (198 lb)   SpO2 99%   BMI 30.11 kg/m²   Physical Exam  Vitals reviewed.   Constitutional:       General: He is not in acute distress.     Appearance: Normal appearance.   Cardiovascular:      Rate and Rhythm: Normal rate and regular rhythm.      Pulses: Normal pulses.      Heart sounds: Normal heart sounds. No murmur heard.  Pulmonary:      Effort: Pulmonary effort is normal. No respiratory distress.      Breath sounds: Normal breath sounds. No wheezing or rhonchi.   Chest:      Chest wall: No tenderness.   Abdominal:      General: Bowel sounds are normal. There is no distension.      Palpations: Abdomen is soft.      Tenderness: There is no abdominal tenderness. There is no right CVA tenderness, left CVA tenderness, guarding or rebound.   Skin:     General: Skin is warm and dry.   Neurological:      General: No focal deficit present.      Mental Status: He is alert and oriented to person, place, and time.   Psychiatric:         Mood and Affect: Mood normal.         Assessment & Plan   Diagnoses and all orders for this visit:    1. Hemorrhoids, unspecified hemorrhoid type (Primary)  Comments:  Referral to Colorectal surgery for evaluation.  Cont. current medications for now.  Orders:  -     Ambulatory Referral to Colorectal Surgery    2. Gastroesophageal reflux disease, unspecified whether " esophagitis present  Comments:  Improved.   Cont. current medication.

## 2024-10-08 ENCOUNTER — OFFICE VISIT (OUTPATIENT)
Dept: FAMILY MEDICINE CLINIC | Facility: CLINIC | Age: 44
End: 2024-10-08
Payer: COMMERCIAL

## 2024-10-08 VITALS
HEART RATE: 75 BPM | SYSTOLIC BLOOD PRESSURE: 129 MMHG | WEIGHT: 198 LBS | HEIGHT: 68 IN | BODY MASS INDEX: 30.01 KG/M2 | OXYGEN SATURATION: 99 % | DIASTOLIC BLOOD PRESSURE: 87 MMHG

## 2024-10-08 DIAGNOSIS — K21.9 GASTROESOPHAGEAL REFLUX DISEASE, UNSPECIFIED WHETHER ESOPHAGITIS PRESENT: ICD-10-CM

## 2024-10-08 DIAGNOSIS — K64.9 HEMORRHOIDS, UNSPECIFIED HEMORRHOID TYPE: Primary | ICD-10-CM

## 2024-10-08 PROCEDURE — 99495 TRANSJ CARE MGMT MOD F2F 14D: CPT | Performed by: NURSE PRACTITIONER

## 2024-11-15 ENCOUNTER — OFFICE (OUTPATIENT)
Age: 44
End: 2024-11-15

## 2024-11-15 ENCOUNTER — OFFICE (OUTPATIENT)
Dept: URBAN - METROPOLITAN AREA CLINIC 64 | Facility: CLINIC | Age: 44
End: 2024-11-15

## 2024-11-15 VITALS
SYSTOLIC BLOOD PRESSURE: 133 MMHG | HEIGHT: 69 IN | WEIGHT: 196 LBS | WEIGHT: 196 LBS | WEIGHT: 196 LBS | HEIGHT: 69 IN | DIASTOLIC BLOOD PRESSURE: 87 MMHG | HEIGHT: 69 IN | SYSTOLIC BLOOD PRESSURE: 133 MMHG | HEIGHT: 69 IN | HEIGHT: 69 IN | HEART RATE: 66 BPM | DIASTOLIC BLOOD PRESSURE: 87 MMHG | DIASTOLIC BLOOD PRESSURE: 87 MMHG | HEIGHT: 69 IN | HEART RATE: 66 BPM | HEART RATE: 66 BPM | DIASTOLIC BLOOD PRESSURE: 87 MMHG | SYSTOLIC BLOOD PRESSURE: 133 MMHG | HEIGHT: 69 IN | HEART RATE: 66 BPM | WEIGHT: 196 LBS | HEART RATE: 66 BPM | SYSTOLIC BLOOD PRESSURE: 133 MMHG | SYSTOLIC BLOOD PRESSURE: 133 MMHG | DIASTOLIC BLOOD PRESSURE: 87 MMHG | DIASTOLIC BLOOD PRESSURE: 87 MMHG | HEART RATE: 66 BPM | WEIGHT: 196 LBS | WEIGHT: 196 LBS | HEART RATE: 66 BPM | WEIGHT: 196 LBS | SYSTOLIC BLOOD PRESSURE: 133 MMHG | SYSTOLIC BLOOD PRESSURE: 133 MMHG | DIASTOLIC BLOOD PRESSURE: 87 MMHG

## 2024-11-15 DIAGNOSIS — F17.200 NICOTINE DEPENDENCE, UNSPECIFIED, UNCOMPLICATED: ICD-10-CM

## 2024-11-15 DIAGNOSIS — K21.9 GASTRO-ESOPHAGEAL REFLUX DISEASE WITHOUT ESOPHAGITIS: ICD-10-CM

## 2024-11-15 DIAGNOSIS — K59.00 CONSTIPATION, UNSPECIFIED: ICD-10-CM

## 2024-11-15 PROCEDURE — 99213 OFFICE O/P EST LOW 20 MIN: CPT

## 2024-11-15 RX ORDER — FAMOTIDINE 40 MG/1
40 TABLET, FILM COATED ORAL
Qty: 30 | Refills: 11 | Status: ACTIVE
Start: 2024-11-15

## 2024-11-18 RX ORDER — PANTOPRAZOLE SODIUM 40 MG/1
40 TABLET, DELAYED RELEASE ORAL
Qty: 60 TABLET | Refills: 3 | Status: SHIPPED | OUTPATIENT
Start: 2024-11-18

## 2024-12-02 DIAGNOSIS — E78.2 MIXED HYPERLIPIDEMIA: ICD-10-CM

## 2024-12-02 RX ORDER — ATORVASTATIN CALCIUM 20 MG/1
20 TABLET, FILM COATED ORAL DAILY
Qty: 90 TABLET | Refills: 1 | Status: SHIPPED | OUTPATIENT
Start: 2024-12-02

## 2025-02-15 ENCOUNTER — HOSPITAL ENCOUNTER (EMERGENCY)
Facility: HOSPITAL | Age: 45
Discharge: HOME | End: 2025-02-15
Payer: COMMERCIAL

## 2025-02-15 VITALS
SYSTOLIC BLOOD PRESSURE: 136 MMHG | HEART RATE: 74 BPM | TEMPERATURE: 98.5 F | OXYGEN SATURATION: 98 % | BODY MASS INDEX: 29.5 KG/M2 | WEIGHT: 194.67 LBS | RESPIRATION RATE: 16 BRPM | HEIGHT: 68 IN | DIASTOLIC BLOOD PRESSURE: 94 MMHG

## 2025-02-15 DIAGNOSIS — J10.1 INFLUENZA A: Primary | ICD-10-CM

## 2025-02-15 DIAGNOSIS — R11.2 NAUSEA AND VOMITING, UNSPECIFIED VOMITING TYPE: ICD-10-CM

## 2025-02-15 LAB
ALBUMIN SERPL-MCNC: 4.7 G/DL (ref 3.5–5.2)
ALBUMIN/GLOB SERPL: 1.5 G/DL
ALP SERPL-CCNC: 81 U/L (ref 39–117)
ALT SERPL W P-5'-P-CCNC: 18 U/L (ref 1–41)
ANION GAP SERPL CALCULATED.3IONS-SCNC: 18.2 MMOL/L (ref 5–15)
AST SERPL-CCNC: 23 U/L (ref 1–40)
BASOPHILS # BLD AUTO: 0.01 10*3/MM3 (ref 0–0.2)
BASOPHILS NFR BLD AUTO: 0.2 % (ref 0–1.5)
BILIRUB SERPL-MCNC: 0.3 MG/DL (ref 0–1.2)
BUN SERPL-MCNC: 20 MG/DL (ref 6–20)
BUN/CREAT SERPL: 14.4 (ref 7–25)
CALCIUM SPEC-SCNC: 9.3 MG/DL (ref 8.6–10.5)
CHLORIDE SERPL-SCNC: 96 MMOL/L (ref 98–107)
CO2 SERPL-SCNC: 19.8 MMOL/L (ref 22–29)
CREAT SERPL-MCNC: 1.39 MG/DL (ref 0.76–1.27)
DEPRECATED RDW RBC AUTO: 45.1 FL (ref 37–54)
EGFRCR SERPLBLD CKD-EPI 2021: 64.5 ML/MIN/1.73
EOSINOPHIL # BLD AUTO: 0.01 10*3/MM3 (ref 0–0.4)
EOSINOPHIL NFR BLD AUTO: 0.2 % (ref 0.3–6.2)
ERYTHROCYTE [DISTWIDTH] IN BLOOD BY AUTOMATED COUNT: 14.5 % (ref 12.3–15.4)
FLUAV RNA RESP QL NAA+PROBE: DETECTED
FLUBV RNA RESP QL NAA+PROBE: NOT DETECTED
GLOBULIN UR ELPH-MCNC: 3.2 GM/DL
GLUCOSE SERPL-MCNC: 137 MG/DL (ref 65–99)
HCT VFR BLD AUTO: 45.5 % (ref 37.5–51)
HGB BLD-MCNC: 15.1 G/DL (ref 13–17.7)
IMM GRANULOCYTES # BLD AUTO: 0.01 10*3/MM3 (ref 0–0.05)
IMM GRANULOCYTES NFR BLD AUTO: 0.2 % (ref 0–0.5)
LYMPHOCYTES # BLD AUTO: 0.55 10*3/MM3 (ref 0.7–3.1)
LYMPHOCYTES NFR BLD AUTO: 8.5 % (ref 19.6–45.3)
MCH RBC QN AUTO: 28.4 PG (ref 26.6–33)
MCHC RBC AUTO-ENTMCNC: 33.2 G/DL (ref 31.5–35.7)
MCV RBC AUTO: 85.5 FL (ref 79–97)
MONOCYTES # BLD AUTO: 0.53 10*3/MM3 (ref 0.1–0.9)
MONOCYTES NFR BLD AUTO: 8.2 % (ref 5–12)
NEUTROPHILS NFR BLD AUTO: 5.35 10*3/MM3 (ref 1.7–7)
NEUTROPHILS NFR BLD AUTO: 82.7 % (ref 42.7–76)
NRBC BLD AUTO-RTO: 0 /100 WBC (ref 0–0.2)
PLATELET # BLD AUTO: 273 10*3/MM3 (ref 140–450)
PMV BLD AUTO: 9 FL (ref 6–12)
POTASSIUM SERPL-SCNC: 4.4 MMOL/L (ref 3.5–5.2)
PROT SERPL-MCNC: 7.9 G/DL (ref 6–8.5)
RBC # BLD AUTO: 5.32 10*6/MM3 (ref 4.14–5.8)
RSV RNA RESP QL NAA+PROBE: NOT DETECTED
SARS-COV-2 RNA RESP QL NAA+PROBE: NOT DETECTED
SODIUM SERPL-SCNC: 134 MMOL/L (ref 136–145)
WBC NRBC COR # BLD AUTO: 6.46 10*3/MM3 (ref 3.4–10.8)

## 2025-02-15 PROCEDURE — 87637 SARSCOV2&INF A&B&RSV AMP PRB: CPT

## 2025-02-15 PROCEDURE — 25010000002 ONDANSETRON PER 1 MG

## 2025-02-15 PROCEDURE — 96374 THER/PROPH/DIAG INJ IV PUSH: CPT

## 2025-02-15 PROCEDURE — 99283 EMERGENCY DEPT VISIT LOW MDM: CPT

## 2025-02-15 PROCEDURE — 25810000003 SODIUM CHLORIDE 0.9 % SOLUTION

## 2025-02-15 PROCEDURE — 80053 COMPREHEN METABOLIC PANEL: CPT

## 2025-02-15 PROCEDURE — 85025 COMPLETE CBC W/AUTO DIFF WBC: CPT

## 2025-02-15 RX ORDER — SODIUM CHLORIDE 0.9 % (FLUSH) 0.9 %
10 SYRINGE (ML) INJECTION AS NEEDED
Status: DISCONTINUED | OUTPATIENT
Start: 2025-02-15 | End: 2025-02-15 | Stop reason: HOSPADM

## 2025-02-15 RX ORDER — ONDANSETRON 2 MG/ML
4 INJECTION INTRAMUSCULAR; INTRAVENOUS ONCE
Status: COMPLETED | OUTPATIENT
Start: 2025-02-15 | End: 2025-02-15

## 2025-02-15 RX ORDER — ONDANSETRON 4 MG/1
4 TABLET, ORALLY DISINTEGRATING ORAL EVERY 8 HOURS PRN
Qty: 15 TABLET | Refills: 0 | Status: SHIPPED | OUTPATIENT
Start: 2025-02-15

## 2025-02-15 RX ADMIN — ONDANSETRON 4 MG: 2 INJECTION, SOLUTION INTRAMUSCULAR; INTRAVENOUS at 07:43

## 2025-02-15 RX ADMIN — SODIUM CHLORIDE 1000 ML: 9 INJECTION, SOLUTION INTRAVENOUS at 07:43

## 2025-02-15 NOTE — ED NOTES
"Patient is currently sitting on room stool as patient states, \"I can't lay in that bed it hurts my sides too much.\" Patient educated that the stool is for medical staff and that it is not safe for the patient too sit there as they are at risk of falling off mechanically from the stool rolling, or if they become light headed it is easy for the patient to fall off. Patient voices the following, \"I promise I wont fall off, I'm fine, but I can't sit in that stretcher it hurts my sides too much.\"   "

## 2025-02-15 NOTE — DISCHARGE INSTRUCTIONS
Rest.  Drink plenty of fluids.  Advance diet as tolerated.  Take medications as prescribed.  Follow-up with primary care, call Monday to schedule an appointment as needed.  Return to the ER for any new or worsening symptoms.

## 2025-02-15 NOTE — ED PROVIDER NOTES
Subjective   History of Present Illness  Chief complaint: Patient reports subjective fever, chills, body aches, nausea vomiting for the past 2 days.      Context: Patient is a 45-year-old male who presents to the ER with complaints of subjective fever, chills, body aches, nausea and vomiting for the past 2 days.  Patient reports he thinks he is dehydrated because he is has some cramping in his stomach keep anything down.  Patient denies any chest pain, shortness of air, diarrhea.    PCP: Keisha Wilde              Review of Systems   Constitutional:  Negative for fever.       Past Medical History:   Diagnosis Date    Bleeding ulcer     Esophagitis     Hiatal hernia     Hyperlipidemia     Hypertension        No Known Allergies    Past Surgical History:   Procedure Laterality Date    COLONOSCOPY      ENDOSCOPY N/A 09/22/2022    Procedure: ESOPHAGOGASTRODUODENOSCOPY with biopsy x2 areas;  Surgeon: Kendrick Meredith MD;  Location: [facility] ENDOSCOPY;  Service: Gastroenterology;  Laterality: N/A;  post: gastritis, hiatal hernia, esophagitis    HERNIA REPAIR         Family History   Problem Relation Age of Onset    Psoriasis Father     Heart disease Maternal Grandmother     Heart disease Paternal Grandmother     Diabetes Paternal Grandmother     Alzheimer's disease Paternal Grandmother     Hypertension Brother     Bipolar disorder Mother        Social History     Socioeconomic History    Marital status: Single   Tobacco Use    Smoking status: Every Day     Current packs/day: 1.50     Average packs/day: 1.5 packs/day for 30.0 years (45.0 ttl pk-yrs)     Types: Cigarettes     Passive exposure: Current    Smokeless tobacco: Never   Vaping Use    Vaping status: Never Used   Substance and Sexual Activity    Alcohol use: Yes     Comment: socially    Drug use: Not Currently     Frequency: 7.0 times per week     Types: Marijuana     Comment: daily    Sexual activity: Defer           Objective   Physical Exam  Vitals and nursing note  "reviewed.   Constitutional:       Appearance: Normal appearance.   HENT:      Head: Normocephalic.      Nose: Nose normal.      Mouth/Throat:      Mouth: Mucous membranes are moist.   Eyes:      Extraocular Movements: Extraocular movements intact.   Cardiovascular:      Rate and Rhythm: Normal rate and regular rhythm.      Pulses: Normal pulses.      Heart sounds: Normal heart sounds.   Pulmonary:      Effort: Pulmonary effort is normal.      Breath sounds: Normal breath sounds.   Abdominal:      Palpations: Abdomen is soft.      Tenderness: There is abdominal tenderness.   Musculoskeletal:         General: Normal range of motion.      Cervical back: Normal range of motion.   Skin:     General: Skin is warm and dry.      Capillary Refill: Capillary refill takes less than 2 seconds.   Neurological:      General: No focal deficit present.      Mental Status: He is alert and oriented to person, place, and time.   Psychiatric:         Mood and Affect: Mood normal.         Behavior: Behavior normal.         Procedures           ED Course           /94 (BP Location: Left arm, Patient Position: Sitting)   Pulse 74   Temp 98.5 °F (36.9 °C)   Resp 16   Ht 172.7 cm (68\")   Wt 88.3 kg (194 lb 10.7 oz)   SpO2 98%   BMI 29.60 kg/m²   Labs Reviewed   COVID-19/FLUA&B/RSV, NP SWAB IN TRANSPORT MEDIA 1 HR TAT - Abnormal; Notable for the following components:       Result Value    Influenza A PCR Detected (*)     All other components within normal limits    Narrative:     Fact sheet for providers: https://www.fda.gov/media/140221/download    Fact sheet for patients: https://www.fda.gov/media/271866/download    Test performed by PCR.   COMPREHENSIVE METABOLIC PANEL - Abnormal; Notable for the following components:    Glucose 137 (*)     Creatinine 1.39 (*)     Sodium 134 (*)     Chloride 96 (*)     CO2 19.8 (*)     Anion Gap 18.2 (*)     All other components within normal limits    Narrative:     GFR Categories in Chronic " Kidney Disease (CKD)      GFR Category          GFR (mL/min/1.73)    Interpretation  G1                     90 or greater         Normal or high (1)  G2                      60-89                Mild decrease (1)  G3a                   45-59                Mild to moderate decrease  G3b                   30-44                Moderate to severe decrease  G4                    15-29                Severe decrease  G5                    14 or less           Kidney failure          (1)In the absence of evidence of kidney disease, neither GFR category G1 or G2 fulfill the criteria for CKD.    eGFR calculation 2021 CKD-EPI creatinine equation, which does not include race as a factor   CBC WITH AUTO DIFFERENTIAL - Abnormal; Notable for the following components:    Neutrophil % 82.7 (*)     Lymphocyte % 8.5 (*)     Eosinophil % 0.2 (*)     Lymphocytes, Absolute 0.55 (*)     All other components within normal limits   CBC AND DIFFERENTIAL    Narrative:     The following orders were created for panel order CBC & Differential.  Procedure                               Abnormality         Status                     ---------                               -----------         ------                     CBC Auto Differential[[id number]]        Abnormal            Final result                 Please view results for these tests on the individual orders.     Medications   sodium chloride 0.9 % flush 10 mL (has no administration in time range)   sodium chloride 0.9 % bolus 1,000 mL (1,000 mL Intravenous New Bag 2/15/25 0743)   ondansetron (ZOFRAN) injection 4 mg (4 mg Intravenous Given 2/15/25 0743)     No radiology results for the last day                                             Medical Decision Making  Radiology was considered but was not emergently warranted at this time.      Lab interpretation:  Labs all viewed by me and significant for: COVID/flu/RSV positive for influenza A.  BUN 20, creatinine 1.39, sodium 134, potassium  4.4, ALT 18, AST 23, white count 6.46, hemoglobin 15.1.    EKG was considered but was not emergently warranted at this time.    IV was established and labs were obtained to evaluate for infection, electrolyte abnormalities, anemia, viral infection.  Patient is a 45-year-old male who presents to the ER for above complaint.  On initial examination patient was resting in the bed, nontoxic in appearance with no signs and symptoms of distress.  Physical exam revealed lungs clear bilaterally, no tenderness to abdomen on palpation, and no swelling in bilateral lower extremities noted.  Patient reports he thinks he is dehydrated, patient was given some IV fluids and IV Zofran for his nausea.  On reexamination patient was resting comfortably in the bed, nontoxic appearance with no signs and symptoms of distress and states that he feels much better after the medications and fluids.  Discussed findings and decision to discharge.  Advised patient to rest, drink plenty of fluids, advance diet as tolerated, take medications as prescribed told him I be prescribing him some Zofran ODT.  Follow-up with primary care, and return to the ER for any new or worsening symptoms.  Patient verbalized understanding of all discharge instructions.    Appropriate PPE worn during exam.    i discussed findings with patient who voices understanding of discharge instructions, signs and symptoms requiring return to ED; discharged improved and in stable condition with follow up for re-evaluation.  This document is intended for medical expert use only. Reading of this document by patients and/or patient's family without participating medical staff guidance may result in misinterpretation and unintended morbidity.  Any interpretation of such data is the responsibility of the patient and/or family member responsible for the patient in concert with their primary or specialist providers, not to be left for sources of online searches such as [employer], [employer] or  similar queries. Relying on these approaches to knowledge may result in misinterpretation, misguided goals of care and even death should patients or family members try recommendations outside of the realm of professional medical care in a supervised inpatient environment.         Problems Addressed:  Influenza A: complicated acute illness or injury  Nausea and vomiting, unspecified vomiting type: complicated acute illness or injury    Amount and/or Complexity of Data Reviewed  Labs: ordered.    Risk  Prescription drug management.        Final diagnoses:   Influenza A   Nausea and vomiting, unspecified vomiting type       ED Disposition  ED Disposition       ED Disposition   Discharge    Condition   Stable    Comment   --               Keisha Wilde, APRN  [address] [facility]  [address]  [place] [address] [phone]    Schedule an appointment as soon as possible for a visit   Call to schedule an appointment Monday for follow-up as needed.         Medication List        New Prescriptions      ondansetron ODT 4 MG disintegrating tablet  Commonly known as: ZOFRAN-ODT  Take 1 tablet by mouth Every 8 (Eight) Hours As Needed for Nausea or Vomiting.               Where to Get Your Medications        These medications were sent to [id number] - [place], [place] - [address] AT [address] [facility] [address] - [phone]  - [phone]   [address] [place] [address]      Phone: [phone]   ondansetron ODT 4 MG disintegrating tablet            Katie Rasheed, APRN  02/15/25 0224

## 2025-02-17 ENCOUNTER — OFFICE (OUTPATIENT)
Dept: URBAN - METROPOLITAN AREA CLINIC 64 | Facility: CLINIC | Age: 45
End: 2025-02-17

## 2025-02-17 VITALS
SYSTOLIC BLOOD PRESSURE: 132 MMHG | WEIGHT: 204 LBS | DIASTOLIC BLOOD PRESSURE: 91 MMHG | HEIGHT: 69 IN | DIASTOLIC BLOOD PRESSURE: 84 MMHG | HEART RATE: 74 BPM | SYSTOLIC BLOOD PRESSURE: 133 MMHG

## 2025-02-17 DIAGNOSIS — F17.200 NICOTINE DEPENDENCE, UNSPECIFIED, UNCOMPLICATED: ICD-10-CM

## 2025-02-17 DIAGNOSIS — K21.9 GASTRO-ESOPHAGEAL REFLUX DISEASE WITHOUT ESOPHAGITIS: ICD-10-CM

## 2025-02-17 DIAGNOSIS — K59.00 CONSTIPATION, UNSPECIFIED: ICD-10-CM

## 2025-02-17 PROCEDURE — 99213 OFFICE O/P EST LOW 20 MIN: CPT

## 2025-03-14 DIAGNOSIS — E55.9 VITAMIN D DEFICIENCY: ICD-10-CM

## 2025-03-14 RX ORDER — ERGOCALCIFEROL 1.25 MG/1
50000 CAPSULE, LIQUID FILLED ORAL WEEKLY
Qty: 12 CAPSULE | Refills: 1 | Status: SHIPPED | OUTPATIENT
Start: 2025-03-14

## 2025-04-14 ENCOUNTER — TELEPHONE (OUTPATIENT)
Dept: FAMILY MEDICINE CLINIC | Facility: CLINIC | Age: 45
End: 2025-04-14
Payer: COMMERCIAL

## 2025-05-01 DIAGNOSIS — E55.9 VITAMIN D DEFICIENCY: ICD-10-CM

## 2025-05-01 RX ORDER — ERGOCALCIFEROL 1.25 MG/1
50000 CAPSULE, LIQUID FILLED ORAL WEEKLY
Qty: 12 CAPSULE | Refills: 1 | Status: SHIPPED | OUTPATIENT
Start: 2025-05-01

## 2025-05-01 RX ORDER — PANTOPRAZOLE SODIUM 40 MG/1
40 TABLET, DELAYED RELEASE ORAL
Qty: 60 TABLET | Refills: 3 | Status: SHIPPED | OUTPATIENT
Start: 2025-05-01

## 2025-06-30 NOTE — TELEPHONE ENCOUNTER
Patient is requesting to switch the olanzapine. He said it makes him feel super tired all the time   Statement Selected

## 2025-07-23 ENCOUNTER — TELEPHONE (OUTPATIENT)
Dept: FAMILY MEDICINE CLINIC | Facility: CLINIC | Age: 45
End: 2025-07-23
Payer: COMMERCIAL

## 2025-07-23 NOTE — TELEPHONE ENCOUNTER
Pantoprazole Sodium 40MG dr tablets  [facility] Electronic PA Form  (Key: [id number])  Rx #: [id number]  Outcome:Your PA has been resolved, no additional PA is required. For further inquiries please contact the number on the back of the member prescription card.

## 2025-08-01 ENCOUNTER — OFFICE (OUTPATIENT)
Dept: URBAN - METROPOLITAN AREA CLINIC 64 | Facility: CLINIC | Age: 45
End: 2025-08-01
Payer: COMMERCIAL

## 2025-08-01 VITALS
SYSTOLIC BLOOD PRESSURE: 131 MMHG | DIASTOLIC BLOOD PRESSURE: 88 MMHG | HEART RATE: 64 BPM | HEIGHT: 69 IN | WEIGHT: 203 LBS

## 2025-08-01 DIAGNOSIS — K59.00 CONSTIPATION, UNSPECIFIED: ICD-10-CM

## 2025-08-01 DIAGNOSIS — K64.8 OTHER HEMORRHOIDS: ICD-10-CM

## 2025-08-01 DIAGNOSIS — K21.9 GASTRO-ESOPHAGEAL REFLUX DISEASE WITHOUT ESOPHAGITIS: ICD-10-CM

## 2025-08-01 PROCEDURE — 99213 OFFICE O/P EST LOW 20 MIN: CPT

## 2025-08-01 RX ORDER — SORBITOL SOLUTION 70 %
SOLUTION, ORAL MISCELLANEOUS
Qty: 100 | Refills: 0 | Status: ACTIVE
Start: 2025-08-01

## (undated) DEVICE — PK ENDO GI 50

## (undated) DEVICE — BITEBLOCK ENDO W/STRAP 60F A/ LF DISP

## (undated) DEVICE — SINGLE-USE BIOPSY FORCEPS: Brand: RADIAL JAW 4